# Patient Record
Sex: FEMALE | Race: WHITE | NOT HISPANIC OR LATINO | Employment: FULL TIME | URBAN - METROPOLITAN AREA
[De-identification: names, ages, dates, MRNs, and addresses within clinical notes are randomized per-mention and may not be internally consistent; named-entity substitution may affect disease eponyms.]

---

## 2017-02-13 ENCOUNTER — OFFICE VISIT (OUTPATIENT)
Dept: MEDICAL GROUP | Facility: CLINIC | Age: 27
End: 2017-02-13
Payer: COMMERCIAL

## 2017-02-13 VITALS
RESPIRATION RATE: 16 BRPM | OXYGEN SATURATION: 96 % | BODY MASS INDEX: 30.04 KG/M2 | WEIGHT: 180.3 LBS | DIASTOLIC BLOOD PRESSURE: 86 MMHG | HEIGHT: 65 IN | SYSTOLIC BLOOD PRESSURE: 144 MMHG | HEART RATE: 86 BPM | TEMPERATURE: 98.9 F

## 2017-02-13 DIAGNOSIS — R55 SYNCOPE AND COLLAPSE: ICD-10-CM

## 2017-02-13 DIAGNOSIS — Z78.9 USES BIRTH CONTROL: ICD-10-CM

## 2017-02-13 DIAGNOSIS — R94.01 ABNORMAL EEG: ICD-10-CM

## 2017-02-13 DIAGNOSIS — E66.9 OBESITY (BMI 30-39.9): ICD-10-CM

## 2017-02-13 PROCEDURE — 99203 OFFICE O/P NEW LOW 30 MIN: CPT | Performed by: NURSE PRACTITIONER

## 2017-02-13 RX ORDER — DROSPIRENONE AND ETHINYL ESTRADIOL 0.03MG-3MG
KIT ORAL
Refills: 12 | COMMUNITY
Start: 2017-02-07 | End: 2017-08-01

## 2017-02-13 ASSESSMENT — PATIENT HEALTH QUESTIONNAIRE - PHQ9: CLINICAL INTERPRETATION OF PHQ2 SCORE: 0

## 2017-02-13 NOTE — Clinical Note
[facility]  AMADEO Wakefield  [address]  [place] [address]  Fax: [phone] Authorization for Release/Disclosure of Protected Health Information   Name: MERLY LEAL : 1990 SSN: [id number]   Address: [address] [place] [address] [place] [address] Phone:    [phone] (home)    I authorize the entity listed below to release/disclose the PHI below to [facility]/AMADEO Wakefield   Provider or Entity Name:  Dr. Janette Pineda      Address   [facility], [place]  [address] [place] [address] [place] [address], [place] [address]   Phone:  [phone]    Fax:     Reason for request: continuity of care   Information to be released:    [  ] LAST COLONOSCOPY, including any PATH REPORT [  ] LAST DEXA  [  ] LAST MAMMOGRAM  [  ] LAST PAP [  ] RETINA EXAM REPORT  [  ] IMMUNIZATION RECORDS  [  ] Release all info      [  ] Check here and initial the line next to each item to release ALL health information INCLUDING  _____ Care and treatment for drug and / or alcohol abuse  _____ HIV testing, infection status, or AIDS  _____ Genetic Testing    DATES OF SERVICE OR TIME PERIOD TO BE DISCLOSED: _____________  I understand and acknowledge that:  * This Authorization may be revoked at any time by you in writing, except if your health information has already been used or disclosed.  * Your health information that will be used or disclosed as a result of you signing this authorization could be re-disclosed by the recipient. If this occurs, your re-disclosed health information may no longer be protected by State or Federal laws.  * You may refuse to sign this Authorization. Your refusal will not affect your ability to obtain treatment.  * This Authorization becomes effective upon signing and will  on (date) __________. If no date is indicated, this Authorization will  one (1) year from the signature date.    Name: Merly Leal    Signature:     Date: 2017

## 2017-02-13 NOTE — PROGRESS NOTES
"CC: Establish Care        HPI:     Natasha presents today for the following:  Patient here to establish care.  Transfer from [facility]  All problems are new to me today  Patient's past medical, family, and social history reviewed and placed in Epic today. Immunizations reviewed. Prescriptions-reviewed and entered in Epic    1. Abnormal EEG/Syncope and collapse  Patient states since about the age of 18 and she is been having syncopal episodes. About 30% of the time she will ask she collapsed to the ground with full blackout. Episodes can range between 1-2 minutes following up to 5 minutes and this includes her \"calming down\". She was seen about 3 years ago at [facility] for an overnight observation related to these episodes. EKGs were done, blood work was done she also had an EEG which she states was abnormal. It was desired for her to follow-up with neurology however due to an insurance change or something of that sort this was unable to be done.   She did get some sort of aura before the symptoms where she has a warm sensation to her body, will see \"dots\", will feel sort of shortness of breath. She will not have any associated chest pain or palpitations. This activity is never associated with exercise and is usually associated with rest. Where she sitting down at work typing etc.  She states she often is able to control it and keep herself prolonged symptoms or from losing consciousness by controlling her breathing and from repeatedly flexing and pumping her right hand.  She tells me the frequency of her symptoms has improved and gone from 1-2 times a week down to 1-2 times a month.     She is requesting a referral for neurologist for this can be completed at this point in time    3. Uses birth control  Is on oral birth control pills.    4. Obesity (BMI 30-39.9)  Above ideal weight for height. She does currently work out a lot and is trying for better health    Current Outpatient Prescriptions   Medication Sig " "Dispense Refill   • drospirenone-ethinyl estradiol (PB) 3-0.03 MG per tablet TAKE 1 TABLET BY MOUTH DAILY  12     No current facility-administered medications for this visit.     Social History   Substance Use Topics   • Smoking status: Never Smoker    • Smokeless tobacco: Never Used   • Alcohol Use: No      Comment: Socially     I reviewed patients allergies, problem list and medications today in [facility].    ROS: Any/all pertinent positives listed in the HPI, otherwise all others reviewed are negative today.      /86 mmHg  Pulse 86  Temp(Src) 37.2 °C (98.9 °F)  Resp 16  Ht 1.638 m (5' 4.5\")  Wt 81.784 kg (180 lb 4.8 oz)  BMI 30.48 kg/m2  SpO2 96%  LMP 02/09/2017  Breastfeeding? No    Exam:   Gen: Alert and oriented, No apparent distress. WDWN  Psych: A+Ox3, normal affect and mood  Skin: Warm, dry and intact. Good turgor   No rashes in visible areas.  Eye: Conjunctiva clear, lids normal  ENMT: Lips without lesions, good dentition.   Neck: No Lymphadenopathy, Thyromegaly, Bruits.   Trachea midline, no masses  Lungs: Clear to auscultation bilaterally, no rales or rhonchi   Unlabored respiratory effort.   CV: Regular rate and rhythm, S1, S2. No murmurs.   No Edema  Abd: Soft non tender, non distended. Normal active bowel sounds.    No Hepatosplenomegaly, No pulsatile masses.   Ext: No clubbing, cyanosis, edema.   Neuro:    CNs: II:PERRLA, III/IV/VI EOM without ptosis or nystagmus, V motor intact, VII facial movements without asymmetry or weakness, iX/X palate midline, XI Neck strength intact, XII tongue protuded midline.  Motor: Strength noted 5/5 upper and lower bilateral extremities with normal tone.  No noted atrophy or deformity of motion.  Gait: Grossly Normal gait  Cerebellar signs: Absent  Tremors : Absent      Assessment and Plan.   26 y.o. female with the following issues.    1. Abnormal EEG/Syncope and collapse  Stable. Currently asymptomatic. Referral to neurology. Will request records from " [facility].  - REFERRAL TO NEUROLOGY  - COMP METABOLIC PANEL; Future  - CBC WITH DIFFERENTIAL; Future  - TSH; Future    3. Uses birth control  Stable. She'll continue medication refills and follow-up as per gynecology    4. Obesity (BMI 30-39.9)  Healthy weight encouraged  - Patient identified as having weight management issue.  Appropriate orders and counseling given.

## 2017-02-13 NOTE — MR AVS SNAPSHOT
"        Natasha Pino   2017 1:00 PM   Office Visit   MRN: [id number]    Department:  [facility]   Dept Phone:  [phone]    Description:  Female : 1990   Provider:  AMADEO Wakefield           Reason for Visit     Establish Care black outs Abnormal EEG. Will request from [facility] (2-3 years old)      Allergies as of 2017     No Known Allergies      You were diagnosed with     Abnormal EEG   [813061]       Syncope and collapse   [780.2.ICD-9-CM]       Uses birth control   [113371]       Obesity (BMI 30-39.9)   [925087]         Vital Signs     Blood Pressure Pulse Temperature Respirations Height Weight    144/86 mmHg 86 37.2 °C (98.9 °F) 16 1.638 m (5' 4.5\") 81.784 kg (180 lb 4.8 oz)    Body Mass Index Oxygen Saturation Last Menstrual Period Breastfeeding? Smoking Status       30.48 kg/m2 96% 2017 No Never Smoker        Basic Information     Date Of Birth Sex Race Ethnicity Preferred Language    1990 Female White Non- English      Problem List              ICD-10-CM Priority Class Noted - Resolved    Obesity (BMI 30-39.9) E66.9   2017 - Present    Uses birth control-OCPs Z30.9   2017 - Present    Syncope and collapse R55   2017 - Present    Abnormal EEG R94.01   2017 - Present      Health Maintenance        Date Due Completion Dates    IMM HEP B VACCINE (1 of 3 - Primary Series) 1989 ---    IMM HEP A VACCINE (1 of 2 - Standard Series) 1990 ---    IMM VARICELLA (CHICKENPOX) VACCINE (1 of 2 - 2 Dose Adolescent Series) 2002 ---    IMM DTaP/Tdap/Td Vaccine (1 - Tdap) 2008 ---    IMM INFLUENZA (1) 2016 ---    PAP SMEAR 3/1/2019 3/1/2016 (Done)    Override on 3/1/2016: Done (NL; GYN=farringer  ABNL with HPV 2011 with LEEP)            Current Immunizations     No immunizations on file.      Below and/or attached are the medications your provider expects you to take. Review all of your home medications and newly ordered " medications with your provider and/or pharmacist. Follow medication instructions as directed by your provider and/or pharmacist. Please keep your medication list with you and share with your provider. Update the information when medications are discontinued, doses are changed, or new medications (including over-the-counter products) are added; and carry medication information at all times in the event of emergency situations     Allergies:  No Known Allergies          Medications  Valid as of: February 13, 2017 -  1:41 PM    Generic Name Brand Name Tablet Size Instructions for use    Drospirenone-Ethinyl Estradiol (Tab) PB 3-0.03 MG TAKE 1 TABLET BY MOUTH DAILY        .                 Medicines prescribed today were sent to:     [id number] - [place], [place] - [address]    [address] [place] [address]    Phone: [phone] Fax: [phone]    Open 24 Hours?: No      Medication refill instructions:       If your prescription bottle indicates you have medication refills left, it is not necessary to call your provider’s office. Please contact your pharmacy and they will refill your medication.    If your prescription bottle indicates you do not have any refills left, you may request refills at any time through one of the following ways: The online [email or web address] system (except Urgent Care), by calling your provider’s office, or by asking your pharmacy to contact your provider’s office with a refill request. Medication refills are processed only during regular business hours and may not be available until the next business day. Your provider may request additional information or to have a follow-up visit with you prior to refilling your medication.   *Please Note: Medication refills are assigned a new Rx number when refilled electronically. Your pharmacy may indicate that no refills were authorized even though a new prescription for the same medication is available at the pharmacy. Please request the medicine by name  with the pharmacy before contacting your provider for a refill.        Your To Do List     Future Labs/Procedures Complete By Expires    CBC WITH DIFFERENTIAL  As directed 2/14/2018    COMP METABOLIC PANEL  As directed 2/14/2018    TSH  As directed 2/14/2018      Referral     A referral request has been sent to our patient care coordination department. Please allow 3-5 business days for us to process this request and contact you either by phone or mail. If you do not hear from us by the 5th business day, please call us at [phone].           its learning Access Code: Activation code not generated  Current its learning Status: Active

## 2017-02-13 NOTE — Clinical Note
[facility]  AMADEO Wakefield  [address]  [place] [address]  Fax: [phone] Authorization for Release/Disclosure of Protected Health Information   Name: MERLY LEAL : 1990 SSN: [id number]   Address: [address] [place] [address]  [place] [address] Phone:    [phone] (home)    I authorize the entity listed below to release/disclose the PHI below to [facility]/AMADEO Wakefield   Provider or Entity Name:  [facility]     Address   City, State, [place], [place] Phone:      Fax:     Reason for request: continuity of care   Information to be released:    [  ] LAST COLONOSCOPY, including any PATH REPORT [  ] LAST DEXA  [  ] LAST MAMMOGRAM  [  ] LAST PAP [  ] RETINA EXAM REPORT  [  ] IMMUNIZATION RECORDS  [X ] Release all info -EEG results from 2-3 years      [  ] Check here and initial the line next to each item to release ALL health information INCLUDING  _____ Care and treatment for drug and / or alcohol abuse  _____ HIV testing, infection status, or AIDS  _____ Genetic Testing    DATES OF SERVICE OR TIME PERIOD TO BE DISCLOSED: _____________  I understand and acknowledge that:  * This Authorization may be revoked at any time by you in writing, except if your health information has already been used or disclosed.  * Your health information that will be used or disclosed as a result of you signing this authorization could be re-disclosed by the recipient. If this occurs, your re-disclosed health information may no longer be protected by State or Federal laws.  * You may refuse to sign this Authorization. Your refusal will not affect your ability to obtain treatment.  * This Authorization becomes effective upon signing and will  on (date) __________. If no date is indicated, this Authorization will  one (1) year from the signature date.    Name: Merly Leal    Signature:     Date: 2017

## 2017-02-15 ENCOUNTER — HOSPITAL ENCOUNTER (OUTPATIENT)
Dept: LAB | Facility: MEDICAL CENTER | Age: 27
End: 2017-02-15
Attending: NURSE PRACTITIONER
Payer: COMMERCIAL

## 2017-02-15 DIAGNOSIS — R55 SYNCOPE AND COLLAPSE: ICD-10-CM

## 2017-02-15 LAB
ALBUMIN SERPL BCP-MCNC: 4.4 G/DL (ref 3.2–4.9)
ALBUMIN/GLOB SERPL: 1.4 G/DL
ALP SERPL-CCNC: 47 U/L (ref 30–99)
ALT SERPL-CCNC: 31 U/L (ref 2–50)
ANION GAP SERPL CALC-SCNC: 9 MMOL/L (ref 0–11.9)
AST SERPL-CCNC: 20 U/L (ref 12–45)
BASOPHILS # BLD AUTO: 0.03 K/UL (ref 0–0.12)
BASOPHILS NFR BLD AUTO: 0.5 % (ref 0–1.8)
BILIRUB SERPL-MCNC: 0.6 MG/DL (ref 0.1–1.5)
BUN SERPL-MCNC: 13 MG/DL (ref 8–22)
CALCIUM SERPL-MCNC: 9.9 MG/DL (ref 8.5–10.5)
CHLORIDE SERPL-SCNC: 104 MMOL/L (ref 96–112)
CO2 SERPL-SCNC: 25 MMOL/L (ref 20–33)
CREAT SERPL-MCNC: 0.79 MG/DL (ref 0.5–1.4)
EOSINOPHIL # BLD: 0.08 K/UL (ref 0–0.51)
EOSINOPHIL NFR BLD AUTO: 1.2 % (ref 0–6.9)
ERYTHROCYTE [DISTWIDTH] IN BLOOD BY AUTOMATED COUNT: 41.1 FL (ref 35.9–50)
GLOBULIN SER CALC-MCNC: 3.1 G/DL (ref 1.9–3.5)
GLUCOSE SERPL-MCNC: 71 MG/DL (ref 65–99)
HCT VFR BLD AUTO: 45.2 % (ref 37–47)
HGB BLD-MCNC: 15.1 G/DL (ref 12–16)
IMM GRANULOCYTES # BLD AUTO: 0.02 K/UL (ref 0–0.11)
IMM GRANULOCYTES NFR BLD AUTO: 0.3 % (ref 0–0.9)
LYMPHOCYTES # BLD: 1.74 K/UL (ref 1–4.8)
LYMPHOCYTES NFR BLD AUTO: 26.7 % (ref 22–41)
MCH RBC QN AUTO: 30.5 PG (ref 27–33)
MCHC RBC AUTO-ENTMCNC: 33.4 G/DL (ref 33.6–35)
MCV RBC AUTO: 91.3 FL (ref 81.4–97.8)
MONOCYTES # BLD: 0.51 K/UL (ref 0–0.85)
MONOCYTES NFR BLD AUTO: 7.8 % (ref 0–13.4)
NEUTROPHILS # BLD: 4.13 K/UL (ref 2–7.15)
NEUTROPHILS NFR BLD AUTO: 63.5 % (ref 44–72)
NRBC # BLD AUTO: 0 K/UL
NRBC BLD-RTO: 0 /100 WBC
PLATELET # BLD AUTO: 358 K/UL (ref 164–446)
PMV BLD AUTO: 9.4 FL (ref 9–12.9)
POTASSIUM SERPL-SCNC: 3.7 MMOL/L (ref 3.6–5.5)
PROT SERPL-MCNC: 7.5 G/DL (ref 6–8.2)
RBC # BLD AUTO: 4.95 M/UL (ref 4.2–5.4)
SODIUM SERPL-SCNC: 138 MMOL/L (ref 135–145)
TSH SERPL DL<=0.005 MIU/L-ACNC: 1.31 UIU/ML (ref 0.3–3.7)
WBC # BLD AUTO: 6.5 K/UL (ref 4.8–10.8)

## 2017-02-15 PROCEDURE — 84443 ASSAY THYROID STIM HORMONE: CPT

## 2017-02-15 PROCEDURE — 85025 COMPLETE CBC W/AUTO DIFF WBC: CPT

## 2017-02-15 PROCEDURE — 36415 COLL VENOUS BLD VENIPUNCTURE: CPT

## 2017-02-15 PROCEDURE — 80053 COMPREHEN METABOLIC PANEL: CPT

## 2017-03-08 ENCOUNTER — HOSPITAL ENCOUNTER (OUTPATIENT)
Dept: LAB | Facility: MEDICAL CENTER | Age: 27
End: 2017-03-08
Attending: OBSTETRICS & GYNECOLOGY
Payer: COMMERCIAL

## 2017-03-08 ENCOUNTER — HOSPITAL ENCOUNTER (OUTPATIENT)
Facility: MEDICAL CENTER | Age: 27
End: 2017-03-08
Attending: OBSTETRICS & GYNECOLOGY
Payer: COMMERCIAL

## 2017-03-08 LAB — CYTOLOGY REG CYTOL: NORMAL

## 2017-03-08 PROCEDURE — 88175 CYTOPATH C/V AUTO FLUID REDO: CPT

## 2017-05-05 ENCOUNTER — OFFICE VISIT (OUTPATIENT)
Dept: NEUROLOGY | Facility: MEDICAL CENTER | Age: 27
End: 2017-05-05
Payer: COMMERCIAL

## 2017-05-05 VITALS
DIASTOLIC BLOOD PRESSURE: 84 MMHG | HEIGHT: 64 IN | HEART RATE: 83 BPM | OXYGEN SATURATION: 96 % | WEIGHT: 180 LBS | RESPIRATION RATE: 16 BRPM | SYSTOLIC BLOOD PRESSURE: 126 MMHG | TEMPERATURE: 97.9 F | BODY MASS INDEX: 30.73 KG/M2

## 2017-05-05 DIAGNOSIS — G40.209 LOCALIZATION-RELATED PARTIAL EPILEPSY WITH COMPLEX PARTIAL SEIZURES (HCC): Primary | ICD-10-CM

## 2017-05-05 DIAGNOSIS — G43.009 MIGRAINE WITHOUT AURA AND WITHOUT STATUS MIGRAINOSUS, NOT INTRACTABLE: ICD-10-CM

## 2017-05-05 PROCEDURE — 99244 OFF/OP CNSLTJ NEW/EST MOD 40: CPT | Performed by: PSYCHIATRY & NEUROLOGY

## 2017-05-05 RX ORDER — IBUPROFEN 200 MG
400 TABLET ORAL EVERY 6 HOURS PRN
COMMUNITY
End: 2021-03-04

## 2017-05-05 ASSESSMENT — ENCOUNTER SYMPTOMS
DEPRESSION: 0
SEIZURES: 1
TREMORS: 0
LOSS OF CONSCIOUSNESS: 1
HEADACHES: 1
PHOTOPHOBIA: 0
MYALGIAS: 0
DOUBLE VISION: 0
MEMORY LOSS: 0

## 2017-05-05 NOTE — PROGRESS NOTES
"Subjective:      Natasha Pino is a 26 y.o. female who presents from the office of MANNY Thomas, for consultation with a history of likely partial onset seizures and migraine without aura.    HPI    Ms. Pino is a pleasant 26-year-old right-handed  female who first noted episodes beginning in about 2010 when she still was a freshman in college. Spontaneous in onset, were never provoked by any specific circumstance. The episodes are stereotyped, she describes a sudden flushing sensation as if she were having a \"heat stroke\", there was some occasional familiarity to this and significant dizziness was more consistent. Still awake, she would note the right hand opening and closing involuntarily, and then she will lose alertness. Observers would note she would stare, she could collapse to the ground and then she would stiffen, her eyes simply roving and she would remain unresponsive. The episode would last for minutes at a time, and she would then begin to awaken slowly, there was always a post ictal sense of malaise, confusion and dizziness which would last 24 hours though she would recover completely without sequelae.    When things first started, she would average 1-2 episodes in a week, with her first pregnancy about 3 years later, they actually stopped entirely and following the pregnancy she has had maybe 1-2 episodes every 2 weeks or more. She was never able to identify other provoking circumstances, the events occurred randomly over the course of day, she was never awakened by them at night.    For a long time she never sought attention for these. Eventually she was evaluated in November, 2013 during that first pregnancy, EEG study was done at that time (I did review the report in the electronic health record) and was normal except for some findings consistent with sedative effect because of increased beta activity. MRI imaging was never done. She was never treated at that time given that " "she was pregnant and the events themselves had stopped. Repeat studies have never been performed. Now that she has a young child with her, given that the events have an altered alertness and loss of voluntary motor control, she has been more concerned and is now seeking further evaluations and recommendations.    The developmental and birth histories are remarkable for the fact that she was premature at 27 weeks of age, though there was no history of developmental or motor skills delay as she got older, no history of febrile convulsions, etc.    Medical history is remarkable for migraine without aura, never associated with altered sensorium, always incapacitating with heightened sensitivities, etc. These occur maybe once a month, historically there were no triggers, though now she notes these beginning within less than 24 hours after one of her \"seizure\" events described above. She is not been treated. Medical history is otherwise completely unremarkable. There is no surgical history of note, she denies a history of recurrent head trauma. She is not on birth control, and during the placebo week, with menses, neither her headaches nor these events occur consistently.    There is a family history of seizures beginning in her 19-year-old sister, she knows little of her mother, her father never had headaches or seizures. Her 4-year-old son as well as her other 3 siblings are alive and well. She drinks alcohol rarely, does not smoke. She works for a local [employer] firm, her fiancé, Jovi, is a construction. She denies any atypical stressors. Other than birth control, she has Motrin and Excedrin used as needed.    Review of Systems   Constitutional: Negative for malaise/fatigue.   Eyes: Negative for double vision and photophobia.   Musculoskeletal: Negative for myalgias.   Neurological: Positive for seizures, loss of consciousness and headaches. Negative for tremors.   Psychiatric/Behavioral: Negative for depression " "and memory loss.   All other systems reviewed and are negative.       Objective:     /84 mmHg  Pulse 83  Temp(Src) 36.6 °C (97.9 °F)  Resp 16  Ht 1.638 m (5' 4.49\")  Wt 81.647 kg (180 lb)  BMI 30.43 kg/m2  SpO2 96%     Physical Exam    She appears in no acute distress. Vital signs are stable. There is no malar rash, jaw or temporal tenderness, jaw claudication, or allodynia. The neck is supple, range of motion is full, carotid pulses are present bilaterally without asymmetry or bruits. Cardiac evaluation reveals a regular rhythm without murmur. There is no evidence of lower extremity edema, nor evidence of diffuse rash or bruising.    She is fully oriented, there is no aphasia, agnosia, apraxia, or inattention.    PERRLA/EOMI, visual fields are full to finger counting bilaterally, funduscopic exam reveals sharp disc margins, there is no facial asymmetry, sensory loss, or bulbar dysfunction. The tongue and uvular midline, jaw jerk is absent, shoulder shrug and head rotation are intact and symmetric.    Tone is normal throughout, there is no tremor, asterixis, or drift. Strength is 5/5 in all 4 extremities, reflexes are present at all points without asymmetries, both toes are downgoing.    She walks with normal station, heel, toe, and tandem walking are intact. Arm swing is symmetric, there is no circumduction, repetitive movements with the hands, fingers and feet are intact and symmetric with maintained and normal amplitude and frequencies. There is no appendicular dystaxia with any extremities.    Sensory exam is intact to vibration, temperature and pinprick. Romberg is absent.     Assessment/Plan:     1. 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 She's been having this for quite some time, I am a " little disconcerted, as is she, about the increased frequency. By the nature of her work, the fact that she does have a young child, etc., I would recommend empiric treatment with an antiepileptic, pending workup which should include both EEG and MRI imaging. The focality by history suggested left hemispheric localization, either subfrontal or temporal in nature. Though she has a history of migraine, these are not related, I doubt this is related to a sleep disorder, cerebrovascular disease, global hypoperfusion due to arrhythmia, medication effect obviously, etc. Fortunately her neurologic examination is normal, her previous EEG as well, both good prognostic signs.    Face-to-face time spent reviewing all the above. We talked about the nature of epileptic disorders, how symptoms that are part of each attack occur, the rationale for treatments, prognosis, etc. She feels quite comfortable moving forward with treatment as well as diagnostics. Lamictal XR titration will be started at 25 mg daily, increasing weekly to 50 mg and eventually 100 mg daily. Afterwards I would like to get her up to at least 200 mg daily, even 300 mg if needed and necessary. Side effects were reviewed. She was reassured that if side effects occur, we will simply try something else. Because she is of childbearing age, Lamictal would be the ideal choice given its relative safety record in regards to pregnancy risk compared to most of the other second generation antiepileptics available. I will call her with the results of tests as they come in, otherwise we can follow-up in several months, phone contact as we adjust medications. If these events do attenuate as we go up on the lamotrigine, this acts as both a diagnostic as well as therapeutic trial. Fortunately, she has sustained aura where she is awake for long enough to get herself to a safe place such as sitting down or pulling the car over to the side of the road. Thus I think it is safe for  her to drive. Any generalized seizure she does need to go to an emergency room or contact this office, but she does need to stop driving in the interim. All of this was reviewed in full. We talked about eventually getting a medication identification bracelet as well. I did insist that she bring Jovi with her to her next visit.    - REFERRAL TO NEURODIAGNOSTICS (EEG,EP,EMG/NCS/DBS) Modality Requested: EEG-Video  - MR-BRAIN-W/O; Future  - LamoTRIgine 25 & 50 & 100 MG Kit; Take 1 Tab by mouth every day. Take one tab daily, increasing dose, as directed in titration suman.  Dispense: 1 Kit; Refill: 0    2. Migraine without aura and without status migrainosus, not intractable  Stable, not a real contribute a factor when it comes to these seizures, treatment can be initiating moving forwards, I don't think maintenance therapies are indicated given the lower frequency, but a more effective rescue can be discussed in future visits.    Time: Evaluation 60 minutes for exam come review, discussion, and education, high complexity  Discussion: As mentioned in the assessment, over 60% of the time spent face-to-face counseling and coordinating care

## 2017-05-05 NOTE — MR AVS SNAPSHOT
"        Natasha Palacios   2017 8:40 AM   Office Visit   MRN: [id number]    Department:  Neurology [facility]   Dept Phone:  [phone]    Description:  Female : 1990   Provider:  Lino Anguiano M.D.           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   [facility] (--)    [address] [place] [address], [address]  [facility] [id number] [phone]           You will be receiving a confirmation call a few days before your appointment from our automated call confirmation system.              Problem List              ICD-10-CM Priority Class Noted - Resolved    Obesity (BMI 30-39.9) E66.9   2017 - Present    Uses birth control-OCPs Z30.9   2017 - Present    Syncope and collapse R55   2017 - Present    Abnormal EEG R94.01   2017 - Present    Localization-related partial epilepsy with complex partial seizures (CMS-HCC) G40.209   2017 - Present      Health Maintenance        Date Due Completion Dates    IMM HEP B VACCINE (1 of 3 - Primary Series) 1989 ---    IMM HEP A VACCINE (1 of 2 - Standard Series) 1990 ---    IMM VARICELLA (CHICKENPOX) VACCINE (1 of 2 - 2 Dose Adolescent Series) 2002 ---    IMM " DTaP/Tdap/Td Vaccine (1 - Tdap) 6/27/2008 ---    PAP SMEAR 3/8/2020 3/8/2017, 3/1/2016 (Done)    Override on 3/1/2016: Done (NL; GYN=farringer  ABNL with HPV 2011 with LEEP)            Current Immunizations     No immunizations on file.      Below and/or attached are the medications your provider expects you to take. Review all of your home medications and newly ordered medications with your provider and/or pharmacist. Follow medication instructions as directed by your provider and/or pharmacist. Please keep your medication list with you and share with your provider. Update the information when medications are discontinued, doses are changed, or new medications (including over-the-counter products) are added; and carry medication information at all times in the event of emergency situations     Allergies:  No Known Allergies          Medications  Valid as of: May 05, 2017 -  9:45 AM    Generic Name Brand Name Tablet Size Instructions for use    Aspirin-Acetaminophen-Caffeine (Tab) EXCEDRIN 250-250-65 MG Take 1 Tab by mouth every 6 hours as needed for Headache.        Drospirenone-Ethinyl Estradiol (Tab) PB 3-0.03 MG TAKE 1 TABLET BY MOUTH DAILY        Ibuprofen (Tab) MOTRIN 200 MG Take 200 mg by mouth every 6 hours as needed.        LamoTRIgine (Kit) LamoTRIgine 25 & 50 & 100 MG Take 1 Tab by mouth every day. Take one tab daily, increasing dose, as directed in titration suman.        .                 Medicines prescribed today were sent to:     [facility]/PHARMACY #[id number] - [place] - [address]    [address] [place] [address]    Phone: [phone] Fax: [phone]    Open 24 Hours?: No      Medication refill instructions:       If your prescription bottle indicates you have medication refills left, it is not necessary to call your provider’s office. Please contact your pharmacy and they will refill your medication.    If your prescription bottle indicates you do not have any refills left, you may request refills at  any time through one of the following ways: The online [employer] system (except Urgent Care), by calling your provider’s office, or by asking your pharmacy to contact your provider’s office with a refill request. Medication refills are processed only during regular business hours and may not be available until the next business day. Your provider may request additional information or to have a follow-up visit with you prior to refilling your medication.   *Please Note: Medication refills are assigned a new Rx number when refilled electronically. Your pharmacy may indicate that no refills were authorized even though a new prescription for the same medication is available at the pharmacy. Please request the medicine by name with the pharmacy before contacting your provider for a refill.        Your To Do List     Future Labs/Procedures Complete By Expires    MR-BRAIN-W/O  As directed 5/5/2018      Referral     A referral request has been sent to our patient care coordination department. Please allow 3-5 business days for us to process this request and contact you either by phone or mail. If you do not hear from us by the 5th business day, please call us at [phone].           [employer] Access Code: Activation code not generated  Current [employer] Status: Active

## 2017-05-19 ENCOUNTER — OFFICE VISIT (OUTPATIENT)
Dept: NEUROLOGY | Facility: MEDICAL CENTER | Age: 27
End: 2017-05-19
Payer: COMMERCIAL

## 2017-05-19 ENCOUNTER — TELEPHONE (OUTPATIENT)
Dept: NEUROLOGY | Facility: MEDICAL CENTER | Age: 27
End: 2017-05-19

## 2017-05-19 VITALS
SYSTOLIC BLOOD PRESSURE: 106 MMHG | OXYGEN SATURATION: 96 % | RESPIRATION RATE: 16 BRPM | BODY MASS INDEX: 28.51 KG/M2 | HEART RATE: 82 BPM | TEMPERATURE: 98.1 F | WEIGHT: 167 LBS | DIASTOLIC BLOOD PRESSURE: 68 MMHG | HEIGHT: 64 IN

## 2017-05-19 DIAGNOSIS — G40.209 LOCALIZATION-RELATED PARTIAL EPILEPSY WITH COMPLEX PARTIAL SEIZURES (HCC): Primary | ICD-10-CM

## 2017-05-19 PROCEDURE — 99214 OFFICE O/P EST MOD 30 MIN: CPT | Performed by: PSYCHIATRY & NEUROLOGY

## 2017-05-19 RX ORDER — LEVETIRACETAM 500 MG/1
TABLET, FILM COATED, EXTENDED RELEASE ORAL
Qty: 60 TAB | Refills: 1 | Status: SHIPPED | OUTPATIENT
Start: 2017-05-19 | End: 2017-08-01

## 2017-05-19 ASSESSMENT — ENCOUNTER SYMPTOMS
SENSORY CHANGE: 0
HEADACHES: 1
LOSS OF CONSCIOUSNESS: 1
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
## 2017-05-19 NOTE — MR AVS SNAPSHOT
"Natasha Pino   2017 10:40 AM   Office Visit   MRN: [id number]    Department[facility]  [facility]   Dept Phone:  [phone]    Description:  Female : 1990   Provider:  Lino Anguiano M.D.           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)   [581237]  -  Primary       Vital Signs     Blood Pressure Pulse Temperature Respirations Height Weight    106/68 mmHg 82 36.7 °C (98.1 °F) 16 1.626 m (5' 4\") 75.751 kg (167 lb)    Body Mass Index Oxygen Saturation Smoking Status             28.65 kg/m2 96% Never Smoker          Basic Information     Date Of Birth Sex Race Ethnicity Preferred Language    1990 Female White Non- English      Your appointments     May 26, 2017  6:45 PM   MR BRAIN W/O 30 with [address] MRI 2   [facility] - MRI - [address]    [address]  [facility] [address]   [phone]            Aug 07, 2017  4:20 PM   Follow Up Visit with Lino Anguiano M.D.   [facility] Neurology (--)    [email or web address]  [facility] [id number] [phone]           You will be receiving a confirmation call a few days before your appointment from our automated call confirmation system.            Aug 29, 2017  9:00 AM   VIDEO EEG MONITORING with NEURODIAGNOSTIC LAB [facility] Neurology (--)    [email or web address]  [facility] [address]   [phone]           Limit caffeine. Clean, dry hair, no gels, oils, or hairsprays. If testing for seizures or spells, please allow no more than 4 hours of sleep the night before the exam (sleep 12am-4am) Pre-authorization is typically required.              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) G40.209   5/5/2017 - Present    Migraine without aura and without status migrainosus, not intractable G43.009   5/5/2017 - Present      Health Maintenance        Date Due Completion Dates    IMM HEP B VACCINE (1 of 3 - Primary Series) 1989 ---    IMM HEP A VACCINE (1 of 2 - Standard Series) 6/27/1990 ---    IMM VARICELLA (CHICKENPOX) VACCINE (1 of 2 - 2 Dose Adolescent Series) 6/27/2002 ---    IMM DTaP/Tdap/Td Vaccine (1 - Tdap) 6/27/2008 ---    PAP SMEAR 3/8/2020 3/8/2017, 3/1/2016 (Done)    Override on 3/1/2016: Done (NL; GYN=farringer  ABNL with HPV 2011 with LEEP)            Current Immunizations     No immunizations on file.      Below and/or attached are the medications your provider expects you to take. Review all of your home medications and newly ordered medications with your provider and/or pharmacist. Follow medication instructions as directed by your provider and/or pharmacist. Please keep your medication list with you and share with your provider. Update the information when medications are discontinued, doses are changed, or new medications (including over-the-counter products) are added; and carry medication information at all times in the event of emergency situations     Allergies:  No Known Allergies          Medications  Valid as of: May 19, 2017 - 11:18 AM    Generic Name Brand Name Tablet Size Instructions for use    Aspirin-Acetaminophen-Caffeine (Tab) EXCEDRIN 250-250-65 MG Take 1 Tab by mouth every 6 hours as needed for Headache.        Drospirenone-Ethinyl Estradiol (Tab) PB 3-0.03 MG TAKE 1 TABLET BY MOUTH DAILY        Ibuprofen (Tab) MOTRIN 200 MG Take 200 mg by mouth every 6 hours as needed.        LamoTRIgine (Kit) LamoTRIgine 25 & 50 & 100 MG Take 1 Tab by mouth every day. Take one tab daily, increasing dose, as directed in titration suman.         LevETIRAcetam (TABLET SR 24 HR) KEPPRA 500 MG 1 tab qhs for 4 days, then 2 tab qhs        .                 Medicines prescribed today were sent to:     [facility]/PHARMACY #[id number] - [place] - [address] [place] [address]    Phone: [phone] Fax: [phone]    Open 24 Hours?: No      Medication refill instructions:       If your prescription bottle indicates you have medication refills left, it is not necessary to call your provider’s office. Please contact your pharmacy and they will refill your medication.    If your prescription bottle indicates you do not have any refills left, you may request refills at any time through one of the following ways: The online [employer] system (except Urgent Care), by calling your provider’s office, or by asking your pharmacy to contact your provider’s office with a refill request. Medication refills are processed only during regular business hours and may not be available until the next business day. Your provider may request additional information or to have a follow-up visit with you prior to refilling your medication.   *Please Note: Medication refills are assigned a new Rx number when refilled electronically. Your pharmacy may indicate that no refills were authorized even though a new prescription for the same medication is available at the pharmacy. Please request the medicine by name with the pharmacy before contacting your provider for a refill.           [employer] Access Code: Activation code not generated  Current [employer] Status: Active

## 2017-05-20 ENCOUNTER — HOSPITAL ENCOUNTER (OUTPATIENT)
Dept: RADIOLOGY | Facility: MEDICAL CENTER | Age: 27
End: 2017-05-20
Attending: PSYCHIATRY & NEUROLOGY
Payer: COMMERCIAL

## 2017-05-20 DIAGNOSIS — G40.209 LOCALIZATION-RELATED PARTIAL EPILEPSY WITH COMPLEX PARTIAL SEIZURES (HCC): ICD-10-CM

## 2017-05-20 PROCEDURE — 70551 MRI BRAIN STEM W/O DYE: CPT

## 2017-05-24 ENCOUNTER — TELEPHONE (OUTPATIENT)
Dept: NEUROLOGY | Facility: MEDICAL CENTER | Age: 27
End: 2017-05-24

## 2017-06-20 ENCOUNTER — OFFICE VISIT (OUTPATIENT)
Dept: URGENT CARE | Facility: CLINIC | Age: 27
End: 2017-06-20
Payer: COMMERCIAL

## 2017-06-20 ENCOUNTER — HOSPITAL ENCOUNTER (OUTPATIENT)
Dept: RADIOLOGY | Facility: MEDICAL CENTER | Age: 27
End: 2017-06-20
Attending: NURSE PRACTITIONER
Payer: COMMERCIAL

## 2017-06-20 ENCOUNTER — HOSPITAL ENCOUNTER (OUTPATIENT)
Facility: MEDICAL CENTER | Age: 27
End: 2017-06-20
Attending: NURSE PRACTITIONER
Payer: COMMERCIAL

## 2017-06-20 VITALS
HEART RATE: 74 BPM | DIASTOLIC BLOOD PRESSURE: 64 MMHG | WEIGHT: 174 LBS | SYSTOLIC BLOOD PRESSURE: 110 MMHG | RESPIRATION RATE: 14 BRPM | HEIGHT: 64 IN | OXYGEN SATURATION: 98 % | TEMPERATURE: 99.7 F | BODY MASS INDEX: 29.71 KG/M2

## 2017-06-20 DIAGNOSIS — R10.9 ABDOMINAL PAIN, UNSPECIFIED LOCATION: ICD-10-CM

## 2017-06-20 DIAGNOSIS — R60.0 BILATERAL EDEMA OF LOWER EXTREMITY: ICD-10-CM

## 2017-06-20 DIAGNOSIS — R06.02 SOB (SHORTNESS OF BREATH): ICD-10-CM

## 2017-06-20 LAB
ALBUMIN SERPL BCP-MCNC: 4.1 G/DL (ref 3.2–4.9)
ALBUMIN/GLOB SERPL: 1.5 G/DL
ALP SERPL-CCNC: 42 U/L (ref 30–99)
ALT SERPL-CCNC: 22 U/L (ref 2–50)
ANION GAP SERPL CALC-SCNC: 10 MMOL/L (ref 0–11.9)
APPEARANCE UR: NORMAL
AST SERPL-CCNC: 17 U/L (ref 12–45)
BASOPHILS # BLD AUTO: 0.5 % (ref 0–1.8)
BASOPHILS # BLD: 0.03 K/UL (ref 0–0.12)
BILIRUB SERPL-MCNC: 0.5 MG/DL (ref 0.1–1.5)
BILIRUB UR STRIP-MCNC: NORMAL MG/DL
BUN SERPL-MCNC: 8 MG/DL (ref 8–22)
CALCIUM SERPL-MCNC: 9.1 MG/DL (ref 8.5–10.5)
CHLORIDE SERPL-SCNC: 107 MMOL/L (ref 96–112)
CO2 SERPL-SCNC: 24 MMOL/L (ref 20–33)
COLOR UR AUTO: NORMAL
CREAT SERPL-MCNC: 0.74 MG/DL (ref 0.5–1.4)
EOSINOPHIL # BLD AUTO: 0.04 K/UL (ref 0–0.51)
EOSINOPHIL NFR BLD: 0.7 % (ref 0–6.9)
ERYTHROCYTE [DISTWIDTH] IN BLOOD BY AUTOMATED COUNT: 41.9 FL (ref 35.9–50)
GFR SERPL CREATININE-BSD FRML MDRD: >60 ML/MIN/1.73 M 2
GLOBULIN SER CALC-MCNC: 2.8 G/DL (ref 1.9–3.5)
GLUCOSE SERPL-MCNC: 124 MG/DL (ref 65–99)
GLUCOSE UR STRIP.AUTO-MCNC: NORMAL MG/DL
HCT VFR BLD AUTO: 40.7 % (ref 37–47)
HGB BLD-MCNC: 13.8 G/DL (ref 12–16)
IMM GRANULOCYTES # BLD AUTO: 0.02 K/UL (ref 0–0.11)
IMM GRANULOCYTES NFR BLD AUTO: 0.3 % (ref 0–0.9)
KETONES UR STRIP.AUTO-MCNC: NORMAL MG/DL
LEUKOCYTE ESTERASE UR QL STRIP.AUTO: NORMAL
LYMPHOCYTES # BLD AUTO: 1.48 K/UL (ref 1–4.8)
LYMPHOCYTES NFR BLD: 25.5 % (ref 22–41)
MCH RBC QN AUTO: 30.8 PG (ref 27–33)
MCHC RBC AUTO-ENTMCNC: 33.9 G/DL (ref 33.6–35)
MCV RBC AUTO: 90.8 FL (ref 81.4–97.8)
MONOCYTES # BLD AUTO: 0.29 K/UL (ref 0–0.85)
MONOCYTES NFR BLD AUTO: 5 % (ref 0–13.4)
NEUTROPHILS # BLD AUTO: 3.94 K/UL (ref 2–7.15)
NEUTROPHILS NFR BLD: 68 % (ref 44–72)
NITRITE UR QL STRIP.AUTO: NORMAL
NRBC # BLD AUTO: 0 K/UL
NRBC BLD AUTO-RTO: 0 /100 WBC
PH UR STRIP.AUTO: 6 [PH] (ref 5–8)
PLATELET # BLD AUTO: 331 K/UL (ref 164–446)
PMV BLD AUTO: 10.2 FL (ref 9–12.9)
POTASSIUM SERPL-SCNC: 3.7 MMOL/L (ref 3.6–5.5)
PROT SERPL-MCNC: 6.9 G/DL (ref 6–8.2)
PROT UR QL STRIP: 6 MG/DL
RBC # BLD AUTO: 4.48 M/UL (ref 4.2–5.4)
RBC UR QL AUTO: NORMAL
SODIUM SERPL-SCNC: 141 MMOL/L (ref 135–145)
SP GR UR STRIP.AUTO: 1.01
UROBILINOGEN UR STRIP-MCNC: 0.2 MG/DL
WBC # BLD AUTO: 5.8 K/UL (ref 4.8–10.8)

## 2017-06-20 PROCEDURE — 85025 COMPLETE CBC W/AUTO DIFF WBC: CPT

## 2017-06-20 PROCEDURE — 81002 URINALYSIS NONAUTO W/O SCOPE: CPT | Performed by: NURSE PRACTITIONER

## 2017-06-20 PROCEDURE — 74176 CT ABD & PELVIS W/O CONTRAST: CPT

## 2017-06-20 PROCEDURE — 99204 OFFICE O/P NEW MOD 45 MIN: CPT | Performed by: NURSE PRACTITIONER

## 2017-06-20 PROCEDURE — 80053 COMPREHEN METABOLIC PANEL: CPT

## 2017-06-20 ASSESSMENT — ENCOUNTER SYMPTOMS
LEG SWELLING: 1
CLAUDICATION: 1
ABDOMINAL PAIN: 1
DIARRHEA: 0
FATIGUE: 1
FEVER: 0
CONSTIPATION: 0
SHORTNESS OF BREATH: 1
VOMITING: 0
FLANK PAIN: 0
COUGH: 0

## 2017-06-20 NOTE — MR AVS SNAPSHOT
"        Natasha Pino   2017 11:30 AM   Office Visit   MRN: [id number]    Department:  [facility]   Dept Phone:  [phone]    Description:  Female : 1990   Provider:  AMADEO Castillo           Reason for Visit     Leg Swelling bilateral legs x 2 days and getting worse and painfull, hard to walk      Allergies as of 2017     No Known Allergies      You were diagnosed with     Bilateral edema of lower extremity   [325723]       SOB (shortness of breath)   [172098]       Abdominal pain, unspecified location   [4216350]         Vital Signs     Blood Pressure Pulse Temperature Respirations Height Weight    110/64 mmHg 74 37.6 °C (99.7 °F) 14 1.626 m (5' 4\") 78.926 kg (174 lb)    Body Mass Index Oxygen Saturation Smoking Status             29.85 kg/m2 98% Never Smoker          Basic Information     Date Of Birth Sex Race Ethnicity Preferred Language    1990 Female White Non- English      Your appointments     2017  5:00 PM   CT BODY WO 30 with [facility] CT 1   [facility]    [address]  [place] [address] [phone]           Some exams require specific prep instructions that would have been given to you at time of scheduling. If you have any additional questions about the prep instructions, please call Imaging Scheduling at [phone] and press #2.            2017  9:40 AM   Follow Up Visit with Lion Anguiano M.D.   [facility] Neurology (--)    [address], [address]  [place] [address] [phone]           You will be receiving a confirmation call a few days before your appointment from our automated call confirmation system.            Aug 29, 2017  9:00 AM   VIDEO EEG MONITORING with NEURODIAGNOSTIC LAB [facility] Neu[facility] (--)    [address], [facility] [address]  [place] [address] [phone]           Limit caffeine. Clean, dry hair, no gels, oils, or hairsprays. If testing " for seizures or spells, please allow no more than 4 hours of sleep the night before the exam (sleep 12am-4am) Pre-authorization is typically required.              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) G40.209   5/5/2017 - Present    Migraine without aura and without status migrainosus, not intractable G43.009   5/5/2017 - Present      Health Maintenance        Date Due Completion Dates    IMM HEP B VACCINE (1 of 3 - Primary Series) 1989 ---    IMM HEP A VACCINE (1 of 2 - Standard Series) 6/27/1990 ---    IMM VARICELLA (CHICKENPOX) VACCINE (1 of 2 - 2 Dose Adolescent Series) 6/27/2002 ---    IMM DTaP/Tdap/Td Vaccine (1 - Tdap) 6/27/2008 ---    PAP SMEAR 3/8/2020 3/8/2017, 3/1/2016 (Done)    Override on 3/1/2016: Done (NL; GYN=farringer  ABNL with HPV 2011 with LEEP)            Current Immunizations     No immunizations on file.      Below and/or attached are the medications your provider expects you to take. Review all of your home medications and newly ordered medications with your provider and/or pharmacist. Follow medication instructions as directed by your provider and/or pharmacist. Please keep your medication list with you and share with your provider. Update the information when medications are discontinued, doses are changed, or new medications (including over-the-counter products) are added; and carry medication information at all times in the event of emergency situations     Allergies:  No Known Allergies          Medications  Valid as of: June 20, 2017 -  1:16 PM    Generic Name Brand Name Tablet Size Instructions for use    Aspirin-Acetaminophen-Caffeine (Tab) EXCEDRIN 250-250-65 MG Take 1 Tab by mouth every 6 hours as needed for  Headache.        Drospirenone-Ethinyl Estradiol (Tab) PB 3-0.03 MG TAKE 1 TABLET BY MOUTH DAILY        Ibuprofen (Tab) MOTRIN 200 MG Take 200 mg by mouth every 6 hours as needed.        LamoTRIgine (Kit) LamoTRIgine 25 & 50 & 100 MG Take 1 Tab by mouth every day. Take one tab daily, increasing dose, as directed in titration suman.        LevETIRAcetam (TABLET SR 24 HR) KEPPRA 500 MG 1 tab qhs for 4 days, then 2 tab qhs        .                 Medicines prescribed today were sent to:     [facility]/PHARMACY #[id number] - [place] - [address] [place] [address]    Phone: [phone] Fax: [phone]    Open 24 Hours?: No      Medication refill instructions:       If your prescription bottle indicates you have medication refills left, it is not necessary to call your provider’s office. Please contact your pharmacy and they will refill your medication.    If your prescription bottle indicates you do not have any refills left, you may request refills at any time through one of the following ways: The online [employer] system (except Urgent Care), by calling your provider’s office, or by asking your pharmacy to contact your provider’s office with a refill request. Medication refills are processed only during regular business hours and may not be available until the next business day. Your provider may request additional information or to have a follow-up visit with you prior to refilling your medication.   *Please Note: Medication refills are assigned a new Rx number when refilled electronically. Your pharmacy may indicate that no refills were authorized even though a new prescription for the same medication is available at the pharmacy. Please request the medicine by name with the pharmacy before contacting your provider for a refill.        Your To Do List     Future Labs/Procedures Complete By Expires    CBC WITH DIFFERENTIAL  As directed 6/20/2018    COMP METABOLIC PANEL  As directed 6/20/2018    CT-RENAL COLIC  EVALUATION(A/P W/O)  As directed 6/20/2018    ESTIMATED GFR  As directed 6/20/2018         [employer] Access Code: Activation code not generated  Current [employer] Status: Active

## 2017-06-20 NOTE — PROGRESS NOTES
Subjective:      Natasha Pino is a 26 y.o. female who presents with Leg Swelling            Leg Swelling  This is a new problem. Episode onset: she reports that her leg swelling started two days ago after she came back from [facility]. She feels like it is getting worse. She has also noticed she is recently more fatigued and short of breath. She is very active, so this is unusual for her. The problem has been gradually worsening. Associated symptoms include abdominal pain and fatigue. Pertinent negatives include no coughing, fever or vomiting. Associated symptoms comments: Reports pain in her right knee and pain with walking in both legs, like her legs are cramping. The symptoms are aggravated by walking. She has tried rest (elevating her legs) for the symptoms. The treatment provided no relief.       Review of Systems   Constitutional: Positive for malaise/fatigue and fatigue. Negative for fever.   Respiratory: Positive for shortness of breath. Negative for cough.    Cardiovascular: Positive for claudication and leg swelling.   Gastrointestinal: Positive for abdominal pain. Negative for vomiting, diarrhea and constipation.   Genitourinary: Negative for flank pain.   Musculoskeletal: Positive for joint pain (right knee).   All other systems reviewed and are negative.    Past Medical History   Diagnosis Date   • Hypertension      borderline.   • Kidney disease      inpatient-observation.   • Localization-related partial epilepsy with complex partial seizures (CMS-HCC) 5/5/2017   • Migraine without aura and without status migrainosus, not intractable 5/5/2017      Past Surgical History   Procedure Laterality Date   • Leep  2011   • Appendectomy        Social History     Social History   • Marital Status: Single     Spouse Name: N/A   • Number of Children: N/A   • Years of Education: N/A     Occupational History   • Not on file.     Social History Main Topics   • Smoking status: Never Smoker    •  "Smokeless tobacco: Never Used   • Alcohol Use: 0.0 oz/week     0 Standard drinks or equivalent per week      Comment: Socially   • Drug Use: No   • Sexual Activity:     Partners: Male     Birth Control/ Protection: Pill     Other Topics Concern   • Not on file     Social History Narrative          Objective:     /64 mmHg  Pulse 74  Temp(Src) 37.6 °C (99.7 °F)  Resp 14  Ht 1.626 m (5' 4\")  Wt 78.926 kg (174 lb)  BMI 29.85 kg/m2  SpO2 98%     Physical Exam   Constitutional: She is oriented to person, place, and time. Vital signs are normal. She appears well-developed and well-nourished.   HENT:   Head: Normocephalic and atraumatic.   Eyes: EOM are normal. Pupils are equal, round, and reactive to light.   Neck: Normal range of motion.   Cardiovascular: Normal rate and regular rhythm.    Pulmonary/Chest: Effort normal.   Abdominal: Normal appearance and bowel sounds are normal. There is no tenderness. There is no CVA tenderness.   Musculoskeletal: Normal range of motion.        Right ankle: She exhibits swelling. She exhibits normal pulse.        Left ankle: She exhibits swelling. She exhibits normal pulse.        Right lower leg: She exhibits edema.        Left lower leg: She exhibits edema.   2+ pitting edema  Pedal pulses strong  Mild sunburn noted to both legs   Neurological: She is alert and oriented to person, place, and time.   Skin: Skin is warm and dry.   Psychiatric: She has a normal mood and affect. Her speech is normal and behavior is normal. Thought content normal.   Vitals reviewed.         6/20/2017 5:08 PM    HISTORY/REASON FOR EXAM:  bilateral fluid retention.      TECHNIQUE/EXAM DESCRIPTION AND NUMBER OF VIEWS:  CT scan renal/colic without contrast.    Helical images of the abdomen and pelvis were obtained from the diaphragmatic domes through the pubic symphysis.    Low dose optimization technique was utilized for this CT exam including automated exposure control and adjustment of the mA " and/or kV according to patient size.    COMPARISON: None.    FINDINGS:  Evaluation of parenchymal and vascular structures is limited by the lack of intravenous contrast.    Lung bases:  The visualized lung bases are clear.    Abdomen:    There is no free air in the abdomen or pelvis. The unenhanced liver, gallbladder, spleen, adrenal glands are unremarkable. Pancreas is unremarkable. There is no biliary ductal dilatation. There is no evidence of hydronephrosis. The mid to distal ureters   are not well delineated. A 3 mm calcific density in the left pelvis could represent a phlebolith or distal ureteral calculus.    Aorta is not aneurysmal. There are small inguinal, retroperitoneal and mesenteric lymph nodes.    There is no evidence of bowel obstruction. Moderate amount of stool seen in the ascending colon. Terminal ileum is unremarkable. Surgical sutures are seen at the base of the cecum.    Bladder is moderately distended. A tampon is seen within the vagina. Small amount of nonspecific free fluid is seen in the pelvis.    Schmorl's nodes are seen in the lower thoracic spine.           Impression        3 mm calcific density in the left pelvis could represent a phlebolith or distal ureteral calculus.    No evidence of hydronephrosis.    Moderate amount of stool in the ascending colon.    Small amount of nonspecific free fluid in the pelvis.          Assessment/Plan:     1. Bilateral edema of lower extremity  - CBC WITH DIFFERENTIAL; Future  - COMP METABOLIC PANEL; Future  - ESTIMATED GFR; Future  - POCT Urinalysis  - CT-RENAL COLIC EVALUATION(A/P W/O); Future    2. SOB (shortness of breath)  3. Abdominal pain, unspecified location  - CT-RENAL COLIC EVALUATION(A/P W/O); Future    Called patient with test and lab results. Unfortunately there is not a good explanation for her symptoms as everything appears to be unremarkable. Advised her to use compression stockings, Tylenol and Ibuprofen and elevate her legs to help  with the swelling. If her pain and swelling persists, strict ER precautions were discussed. She V/U and agrees  Instructed to return to  or nearest emergency department if symptoms fail to improve, for any change in condition, further concerns, or new concerning symptoms.  Patient states understanding of the plan of care and discharge instructions.

## 2017-06-21 ENCOUNTER — HOSPITAL ENCOUNTER (EMERGENCY)
Facility: MEDICAL CENTER | Age: 27
End: 2017-06-21
Attending: EMERGENCY MEDICINE
Payer: COMMERCIAL

## 2017-06-21 VITALS
SYSTOLIC BLOOD PRESSURE: 138 MMHG | RESPIRATION RATE: 16 BRPM | HEIGHT: 64 IN | BODY MASS INDEX: 30.64 KG/M2 | WEIGHT: 179.45 LBS | HEART RATE: 83 BPM | TEMPERATURE: 99 F | DIASTOLIC BLOOD PRESSURE: 68 MMHG | OXYGEN SATURATION: 98 %

## 2017-06-21 DIAGNOSIS — L55.1 SUNBURN OF SECOND DEGREE: ICD-10-CM

## 2017-06-21 DIAGNOSIS — R60.0 PEDAL EDEMA: ICD-10-CM

## 2017-06-21 LAB
ALBUMIN SERPL BCP-MCNC: 4.2 G/DL (ref 3.2–4.9)
ALBUMIN/GLOB SERPL: 1.6 G/DL
ALP SERPL-CCNC: 50 U/L (ref 30–99)
ALT SERPL-CCNC: 27 U/L (ref 2–50)
ANION GAP SERPL CALC-SCNC: 7 MMOL/L (ref 0–11.9)
AST SERPL-CCNC: 16 U/L (ref 12–45)
BASOPHILS # BLD AUTO: 0.6 % (ref 0–1.8)
BASOPHILS # BLD: 0.04 K/UL (ref 0–0.12)
BILIRUB SERPL-MCNC: 0.4 MG/DL (ref 0.1–1.5)
BNP SERPL-MCNC: 31 PG/ML (ref 0–100)
BUN SERPL-MCNC: 12 MG/DL (ref 8–22)
CALCIUM SERPL-MCNC: 9.3 MG/DL (ref 8.5–10.5)
CHLORIDE SERPL-SCNC: 109 MMOL/L (ref 96–112)
CO2 SERPL-SCNC: 26 MMOL/L (ref 20–33)
CREAT SERPL-MCNC: 0.7 MG/DL (ref 0.5–1.4)
DEPRECATED D DIMER PPP IA-ACNC: 443 NG/ML(D-DU)
EOSINOPHIL # BLD AUTO: 0.1 K/UL (ref 0–0.51)
EOSINOPHIL NFR BLD: 1.6 % (ref 0–6.9)
ERYTHROCYTE [DISTWIDTH] IN BLOOD BY AUTOMATED COUNT: 41.6 FL (ref 35.9–50)
GFR SERPL CREATININE-BSD FRML MDRD: >60 ML/MIN/1.73 M 2
GLOBULIN SER CALC-MCNC: 2.7 G/DL (ref 1.9–3.5)
GLUCOSE SERPL-MCNC: 82 MG/DL (ref 65–99)
HCG SERPL QL: NEGATIVE
HCT VFR BLD AUTO: 39.1 % (ref 37–47)
HGB BLD-MCNC: 13.4 G/DL (ref 12–16)
IMM GRANULOCYTES # BLD AUTO: 0.02 K/UL (ref 0–0.11)
IMM GRANULOCYTES NFR BLD AUTO: 0.3 % (ref 0–0.9)
LYMPHOCYTES # BLD AUTO: 1.42 K/UL (ref 1–4.8)
LYMPHOCYTES NFR BLD: 22.7 % (ref 22–41)
MCH RBC QN AUTO: 30.9 PG (ref 27–33)
MCHC RBC AUTO-ENTMCNC: 34.3 G/DL (ref 33.6–35)
MCV RBC AUTO: 90.3 FL (ref 81.4–97.8)
MONOCYTES # BLD AUTO: 0.55 K/UL (ref 0–0.85)
MONOCYTES NFR BLD AUTO: 8.8 % (ref 0–13.4)
NEUTROPHILS # BLD AUTO: 4.12 K/UL (ref 2–7.15)
NEUTROPHILS NFR BLD: 66 % (ref 44–72)
NRBC # BLD AUTO: 0 K/UL
NRBC BLD AUTO-RTO: 0 /100 WBC
PLATELET # BLD AUTO: 332 K/UL (ref 164–446)
PMV BLD AUTO: 9.4 FL (ref 9–12.9)
POTASSIUM SERPL-SCNC: 4 MMOL/L (ref 3.6–5.5)
PROT SERPL-MCNC: 6.9 G/DL (ref 6–8.2)
RBC # BLD AUTO: 4.33 M/UL (ref 4.2–5.4)
SODIUM SERPL-SCNC: 142 MMOL/L (ref 135–145)
TSH SERPL DL<=0.005 MIU/L-ACNC: 1.24 UIU/ML (ref 0.3–3.7)
WBC # BLD AUTO: 6.3 K/UL (ref 4.8–10.8)

## 2017-06-21 PROCEDURE — 83880 ASSAY OF NATRIURETIC PEPTIDE: CPT

## 2017-06-21 PROCEDURE — 84443 ASSAY THYROID STIM HORMONE: CPT

## 2017-06-21 PROCEDURE — 99284 EMERGENCY DEPT VISIT MOD MDM: CPT

## 2017-06-21 PROCEDURE — 80053 COMPREHEN METABOLIC PANEL: CPT

## 2017-06-21 PROCEDURE — 85379 FIBRIN DEGRADATION QUANT: CPT

## 2017-06-21 PROCEDURE — 93970 EXTREMITY STUDY: CPT | Mod: 26 | Performed by: SURGERY

## 2017-06-21 PROCEDURE — 93970 EXTREMITY STUDY: CPT

## 2017-06-21 PROCEDURE — 84703 CHORIONIC GONADOTROPIN ASSAY: CPT

## 2017-06-21 PROCEDURE — 85025 COMPLETE CBC W/AUTO DIFF WBC: CPT

## 2017-06-21 RX ORDER — HYDROCHLOROTHIAZIDE 25 MG/1
25 TABLET ORAL DAILY
Qty: 30 TAB | Refills: 1 | Status: SHIPPED | OUTPATIENT
Start: 2017-06-21 | End: 2017-08-01

## 2017-06-21 NOTE — ED AVS SNAPSHOT
Home Care Instructions                                                                                                                Natasha Pino   MRN: [id number]    Department:  [facility], Emergency Dept   Date of Visit:  6/21/2017            [facility], Emergency Dept    [address]    [facility] [address]    Phone:  [phone]      You were seen by     Chapo Arroyo M.D.      Your Diagnosis Was     Pedal edema     R60.0       Follow-up Information     1. Follow up with AMADEO Wakefield In 2 days.    Specialty:  Family Medicine    Why:  As needed, If symptoms worsen    Contact information    [address] [facility] [address]  [address]  [facility] [address] [phone]        Medication Information     Review all of your home medications and newly ordered medications with your primary doctor and/or pharmacist as soon as possible. Follow medication instructions as directed by your doctor and/or pharmacist.     Please keep your complete medication list with you and share with your physician. Update the information when medications are discontinued, doses are changed, or new medications (including over-the-counter products) are added; and carry medication information at all times in the event of emergency situations.               Medication List      START taking these medications        Instructions    Morning Afternoon Evening Bedtime    * hydrochlorothiazide 25 MG Tabs   Commonly known as:  HYDRODIURIL        Take 1 Tab by mouth every day.   Dose:  25 mg                        * hydrochlorothiazide 25 MG Tabs   Commonly known as:  HYDRODIURIL        Take 1 Tab by mouth every day.   Dose:  25 mg                        * Notice:  This list has 2 medication(s) that are the same as other medications prescribed for you. Read the directions carefully, and ask your doctor or other care provider to review them with you.      ASK your doctor about these medications        Instructions    Morning Afternoon Evening Bedtime    asa/apap/caffeine 250-250-65 MG Tabs   Commonly known as:  EXCEDRIN        Take 1 Tab by mouth every 6 hours as needed for Headache.   Dose:  1 Tab                        drospirenone-ethinyl estradiol 3-0.03 MG per tablet   Commonly known as:  PB        TAKE 1 TABLET BY MOUTH DAILY                        ibuprofen 200 MG Tabs   Commonly known as:  MOTRIN        Take 200 mg by mouth every 6 hours as needed.   Dose:  200 mg                        LamoTRIgine 25 & 50 & 100 MG Kit        Take 1 Tab by mouth every day. Take one tab daily, increasing dose, as directed in titration suman.   Dose:  1 Tab                        levetiracetam 500 MG Tb24   Commonly known as:  KEPPRA XR        1 tab qhs for 4 days, then 2 tab qhs                             Where to Get Your Medications      These medications were sent to [id number] - [place] - [address]  [address], [place] [address]     Phone:  [phone]    - hydrochlorothiazide 25 MG Tabs      You can get these medications from any pharmacy     Bring a paper prescription for each of these medications    - hydrochlorothiazide 25 MG Tabs            Procedures and tests performed during your visit     BTYPE NATRIURETIC PEPTIDE    CBC WITH DIFFERENTIAL    COMP METABOLIC PANEL    D-DIMER    ESTIMATED GFR    HCG QUAL SERUM    LE VENOUS DUPLEX (Specify in Comments Left, Right Or Bilateral)    SALINE LOCK    TSH        Discharge Instructions       Peripheral Edema  You have swelling in your legs (peripheral edema). This swelling is due to excess accumulation of salt and water in your body. Edema may be a sign of heart, kidney or liver disease, or a side effect of a medication. It may also be due to problems in the leg veins. Elevating your legs and using special support stockings may be very helpful, if the cause of the swelling is due to poor venous circulation. Avoid long periods of standing, whatever the  cause.  Treatment of edema depends on identifying the cause. Chips, pretzels, pickles and other salty foods should be avoided. Restricting salt in your diet is almost always needed. Water pills (diuretics) are often used to remove the excess salt and water from your body via urine. These medicines prevent the kidney from reabsorbing sodium. This increases urine flow.  Diuretic treatment may also result in lowering of potassium levels in your body. Potassium supplements may be needed if you have to use diuretics daily. Daily weights can help you keep track of your progress in clearing your edema. You should call your caregiver for follow up care as recommended.  SEEK IMMEDIATE MEDICAL CARE IF:   · You have increased swelling, pain, redness, or heat in your legs.  · You develop shortness of breath, especially when lying down.  · You develop chest or abdominal pain, weakness, or fainting.  · You have a fever.     This information is not intended to replace advice given to you by your health care provider. Make sure you discuss any questions you have with your health care provider.     Document Released: 01/25/2006 Document Revised: 03/11/2013 Document Reviewed: 01/05/2011  [employer] Interactive Patient Education ©2016 [employer]            Patient Information     Patient Information    Following emergency treatment: all patient requiring follow-up care must return either to a private physician or a clinic if your condition worsens before you are able to obtain further medical attention, please return to the emergency room.     Billing Information    At [facility], we work to make the billing process streamlined for our patients.  Our Representatives are here to answer any questions you may have regarding your hospital bill.  If you have insurance coverage and have supplied your insurance information to us, we will submit a claim to your insurer on your behalf.  Should you have any questions regarding your bill, we  can be reached online or by phone as follows:  Online: You are able pay your bills online or live chat with our representatives about any billing questions you may have. We are here to help Monday - Friday from 8:00am to 7:30pm and 9:00am - 12:00pm on Saturdays.  Please visit [email or web address]  for more information.   Phone:  [phone] or [phone]    Please note that your emergency physician, surgeon, pathologist, radiologist, anesthesiologist, and other specialists are not employed by [facility] and will therefore bill separately for their services.  Please contact them directly for any questions concerning their bills at the numbers below:     Emergency Physician Services:  [phone]  [facility]:  [phone]  [facility]:  [phone]  [facility]:  [phone]    1. Your final bill may vary from the amount quoted upon discharge if all procedures are not complete at that time, or if your doctor has additional procedures of which we are not aware. You will receive an additional bill if you return to the Emergency Department at [facility] for suture removal regardless of the facility of which the sutures were placed.     2. Please arrange for settlement of this account at the emergency registration.    3. All self-pay accounts are due in full at the time of treatment.  If you are unable to meet this obligation then payment is expected within 4-5 days.     4. If you have had radiology studies (CT, X-ray, Ultrasound, MRI), you have received a preliminary result during your emergency department visit. Please contact the radiology department [phone] to receive a copy of your final result. Please discuss the Final result with your primary physician or with the follow up physician provided.     Crisis Hotline:  [place] Crisis Hotline:  8-302-TZBFAGT or 1-357.117.5821  [place] Crisis Hotline:    1-489.547.2735 or  355.556.5743         ED Discharge Follow Up Questions    1. In order to provide you with very good care, we would like to follow up with a phone call in the next few days.  May we have your permission to contact you?     YES /  NO    2. What is the best phone number to call you? (       )_____-__________    3. What is the best time to call you?      Morning  /  Afternoon  /  Evening                   Patient Signature:  ____________________________________________________________    Date:  ____________________________________________________________      Your appointments     Jul 11, 2017  9:40 AM   Follow Up Visit with Lino Anguiano M.D.   [facility] (--)    [address], [facility] [address]  [facility] [address]   [phone]           You will be receiving a confirmation call a few days before your appointment from our automated call confirmation system.            Aug 29, 2017  9:00 AM   VIDEO EEG MONITORING with NEURODIAGNOSTIC LAB [facility] (--)    [address], [address]  [facility] [address]   [phone]           Limit caffeine. Clean, dry hair, no gels, oils, or hairsprays. If testing for seizures or spells, please allow no more than 4 hours of sleep the night before the exam (sleep 12am-4am) Pre-authorization is typically required.

## 2017-06-21 NOTE — ED NOTES
"Chief Complaint   Patient presents with   • Leg Swelling     Starting sunday night pt started having swelling starting in her hips down to her feet.      Pt reporting pain in bilateral feet and behind her right knee. Denies any diet changes. Denies decreased urination. Seen at  and kidney fx was checked per pt and it was fine.  Pt states her BLE feel numb. Color of legs and feet pink.   /72 mmHg  Pulse 88  Temp(Src) 37.2 °C (99 °F)  Resp 17  Ht 1.626 m (5' 4.02\")  Wt 81.4 kg (179 lb 7.3 oz)  BMI 30.79 kg/m2  SpO2 98%.  Pt placed back in lobby, educated on triage process, and told to inform staff of any change in condition.     "

## 2017-06-21 NOTE — ED AVS SNAPSHOT
6/21/2017    Natasha Pino  [address] [place] [address]    Dear Natasha:    [facility] wants to ensure your discharge home is safe and you or your loved ones have had all of your questions answered regarding your care after you leave the hospital.    Below is a list of resources and contact information should you have any questions regarding your hospital stay, follow-up instructions, or active medical symptoms.    Questions or Concerns Regarding… Contact   Medical Questions Related to Your Discharge  (7 days a week, 8am-5pm) Contact a Nurse Care Coordinator   [phone]   Medical Questions Not Related to Your Discharge  (24 hours a day / 7 days a week)  Contact the Nurse Health Line   [phone]    Medications or Discharge Instructions Refer to your discharge packet   or contact your [facility] Primary Care Provider   [phone]   Follow-up Appointment(s) Schedule your appointment via [employer]   or contact Scheduling [phone]   Billing Review your statement via [employer]  or contact Billing [phone]   Medical Records Review your records via [employer]   or contact Medical Records [phone]     You may receive a telephone call within two days of discharge. This call is to make certain you understand your discharge instructions and have the opportunity to have any questions answered. You can also easily access your medical information, test results and upcoming appointments via the [employer] free online health management tool. You can learn more and sign up at [email or web address]. For assistance setting up your [employer] account, please call [phone].    Once again, we want to ensure your discharge home is safe and that you have a clear understanding of any next steps in your care. If you have any questions or concerns, please do not hesitate to contact us, we are here for you. Thank you for choosing [facility] for your healthcare needs.    Sincerely,    Your [facility] Healthcare Team

## 2017-06-21 NOTE — ED PROVIDER NOTES
ED Provider Note    CHIEF COMPLAINT  Chief Complaint   Patient presents with   • Leg Swelling     Starting sunday night pt started having swelling starting in her hips down to her feet.        HPI  Natasha Pino is a 26 y.o. female who presents for evaluation of 72 hours of progressive worsening lower extremity swelling and edema. The patient has history of borderline hypertension but is otherwise healthy other than a history of seizures. No new medications. She does not necessarily report leg pain but does report some shortness of breath. She was seen in urgent care yesterday and extensive workup including basic blood tests. Her kidney function and liver function was normal. She had a CT scan of the abdomen and pelvis without contrast which did not demonstrate any significant findings. She reports that the swelling got worse and then she developed shortness of breath but denies chest pain. No night sweats weight loss and numbness weakness but she does report some tingling to her bilateral feet. Of note the patient did recently expose himself to significant son and does have a sunburn from the waist down to her feet    REVIEW OF SYSTEMS  See HPI for further details. No high fevers hemoptysis pleuritic chest pain night sweats fever back pain All other systems are negative.     PAST MEDICAL HISTORY  Past Medical History   Diagnosis Date   • Hypertension      borderline.   • Kidney disease      inpatient-observation.   • Localization-related partial epilepsy with complex partial seizures (CMS-HCC) 5/5/2017   • Migraine without aura and without status migrainosus, not intractable 5/5/2017       FAMILY HISTORY  History of kidney disease    SOCIAL HISTORY  Social History     Social History   • Marital Status: Single     Spouse Name: N/A   • Number of Children: N/A   • Years of Education: N/A     Social History Main Topics   • Smoking status: Never Smoker    • Smokeless tobacco: Never Used   • Alcohol Use: 0.0  "oz/week     0 Standard drinks or equivalent per week      Comment: Socially   • Drug Use: No   • Sexual Activity:     Partners: Male     Birth Control/ Protection: Pill     Other Topics Concern   • None     Social History Narrative     Nonsmoker no IV drugs  SURGICAL HISTORY  Past Surgical History   Procedure Laterality Date   • Leep  2011   • Appendectomy         CURRENT MEDICATIONS  No current facility-administered medications for this encounter.    Current outpatient prescriptions:   •  levetiracetam (KEPPRA XR) 500 MG TABLET SR 24 HR, 1 tab qhs for 4 days, then 2 tab qhs, Disp: 60 Tab, Rfl: 1  •  ibuprofen (MOTRIN) 200 MG Tab, Take 200 mg by mouth every 6 hours as needed., Disp: , Rfl:   •  asa/apap/caffeine (EXCEDRIN) 250-250-65 MG Tab, Take 1 Tab by mouth every 6 hours as needed for Headache., Disp: , Rfl:   •  LamoTRIgine 25 & 50 & 100 MG Kit, Take 1 Tab by mouth every day. Take one tab daily, increasing dose, as directed in titration suman., Disp: 1 Kit, Rfl: 0  •  drospirenone-ethinyl estradiol (PB) 3-0.03 MG per tablet, TAKE 1 TABLET BY MOUTH DAILY, Disp: , Rfl: 12      ALLERGIES  No Known Allergies    PHYSICAL EXAM  VITAL SIGNS: /72 mmHg  Pulse 88  Temp(Src) 37.2 °C (99 °F)  Resp 17  Ht 1.626 m (5' 4.02\")  Wt 81.4 kg (179 lb 7.3 oz)  BMI 30.79 kg/m2  SpO2 98% Room air O2: 98    Constitutional: Well developed, Well nourished, No acute distress, Non-toxic appearance.   HENT: Normocephalic, Atraumatic, Bilateral external ears normal, Oropharynx moist, No oral exudates, Nose normal.   Eyes: PERRLA, EOMI, Conjunctiva normal, No discharge.   Neck: Normal range of motion, No tenderness, Supple, No stridor.   Cardiovascular: Normal heart rate, Normal rhythm, No murmurs, No rubs, No gallops.   Thorax & Lungs: Normal breath sounds, No respiratory distress, No wheezing, No chest tenderness.   Abdomen: Bowel sounds normal, Soft, second-degree sunburn from the waist down to the dorsum of the feet Warm, " Dry, No erythema, No rash.   Back: No tenderness, No CVA tenderness.   Extremities: Intact distal pulses, bilateral 3+ pitting edema from the dorsum of the feet all the way up to the mid thigh negative Homans sign distal neurovascular exam is normal.   Neurologic: Alert & oriented x 3, Normal motor function, Normal sensory function, No focal deficits noted.   Psychiatric: Affect normal, Judgment normal, Mood normal.     Results for orders placed or performed during the hospital encounter of 06/21/17   HCG QUAL SERUM   Result Value Ref Range    Beta-Hcg Qualitative Serum Negative Negative   COMP METABOLIC PANEL   Result Value Ref Range    Sodium 142 135 - 145 mmol/L    Potassium 4.0 3.6 - 5.5 mmol/L    Chloride 109 96 - 112 mmol/L    Co2 26 20 - 33 mmol/L    Anion Gap 7.0 0.0 - 11.9    Glucose 82 65 - 99 mg/dL    Bun 12 8 - 22 mg/dL    Creatinine 0.70 0.50 - 1.40 mg/dL    Calcium 9.3 8.5 - 10.5 mg/dL    AST(SGOT) 16 12 - 45 U/L    ALT(SGPT) 27 2 - 50 U/L    Alkaline Phosphatase 50 30 - 99 U/L    Total Bilirubin 0.4 0.1 - 1.5 mg/dL    Albumin 4.2 3.2 - 4.9 g/dL    Total Protein 6.9 6.0 - 8.2 g/dL    Globulin 2.7 1.9 - 3.5 g/dL    A-G Ratio 1.6 g/dL   CBC WITH DIFFERENTIAL   Result Value Ref Range    WBC 6.3 4.8 - 10.8 K/uL    RBC 4.33 4.20 - 5.40 M/uL    Hemoglobin 13.4 12.0 - 16.0 g/dL    Hematocrit 39.1 37.0 - 47.0 %    MCV 90.3 81.4 - 97.8 fL    MCH 30.9 27.0 - 33.0 pg    MCHC 34.3 33.6 - 35.0 g/dL    RDW 41.6 35.9 - 50.0 fL    Platelet Count 332 164 - 446 K/uL    MPV 9.4 9.0 - 12.9 fL    Neutrophils-Polys 66.00 44.00 - 72.00 %    Lymphocytes 22.70 22.00 - 41.00 %    Monocytes 8.80 0.00 - 13.40 %    Eosinophils 1.60 0.00 - 6.90 %    Basophils 0.60 0.00 - 1.80 %    Immature Granulocytes 0.30 0.00 - 0.90 %    Nucleated RBC 0.00 /100 WBC    Neutrophils (Absolute) 4.12 2.00 - 7.15 K/uL    Lymphs (Absolute) 1.42 1.00 - 4.80 K/uL    Monos (Absolute) 0.55 0.00 - 0.85 K/uL    Eos (Absolute) 0.10 0.00 - 0.51 K/uL    Baso  (Absolute) 0.04 0.00 - 0.12 K/uL    Immature Granulocytes (abs) 0.02 0.00 - 0.11 K/uL    NRBC (Absolute) 0.00 K/uL   D-DIMER   Result Value Ref Range    D-Dimer Screen 443 (H) <250 ng/mL(D-DU)   BTYPE NATRIURETIC PEPTIDE   Result Value Ref Range    B Natriuretic Peptide 31 0 - 100 pg/mL   TSH   Result Value Ref Range    TSH 1.240 0.300 - 3.700 uIU/mL   ESTIMATED GFR   Result Value Ref Range    GFR If African American >60 >60 mL/min/1.73 m 2    GFR If Non African American >60 >60 mL/min/1.73 m 2      LE VENOUS DUPLEX (Specify in Comments Left, Right Or Bilateral)    (Results Pending)     Bilateral lower extremity DVT studies are negative        COURSE & MEDICAL DECISION MAKING  Pertinent Labs & Imaging studies reviewed. (See chart for details)  Patient presents here with isolated leg swelling. Extensive workup was performed. Her thyroid studies BNP albumin were normal. No evidence of renal insufficiency. Bilateral lower extremity ultrasounds were both negative. D-dimer was marginally elevated and she did not have any chest pain or shortness of breath to suggest pulmonary embolism. She also had a sunburn which could obviously cause some edema. She was slightly hypertensive here. I will start her on hydrochlorothiazide and recommend she follow up with her PCP    FINAL IMPRESSION  1. Secondary sunburn lower extremities  2. Pedal edema         Electronically signed by: Chapo Arroyo, 6/21/2017 12:08 PM

## 2017-06-21 NOTE — ED AVS SNAPSHOT
CartiCure Access Code: Activation code not generated  Current CartiCure Status: Active    MOMENTFACE SROhart  A secure, online tool to manage your health information     [employer]’s CartiCure® is a secure, online tool that connects you to your personalized health information from the privacy of your home -- day or night - making it very easy for you to manage your healthcare. Once the activation process is completed, you can even access your medical information using the CartiCure sonny, which is available for free in the Apple Sonny store or Google Play store.     CartiCure provides the following levels of access (as shown below):   My Chart Features   [facility] Primary Care Doctor [facility]  Specialists [facility]  Urgent  Care Non-[facility]  Primary Care  Doctor   Email your healthcare team securely and privately 24/7 X X X X   Manage appointments: schedule your next appointment; view details of past/upcoming appointments X      Request prescription refills. X      View recent personal medical records, including lab and immunizations X X X X   View health record, including health history, allergies, medications X X X X   Read reports about your outpatient visits, procedures, consult and ER notes X X X X   See your discharge summary, which is a recap of your hospital and/or ER visit that includes your diagnosis, lab results, and care plan. X X       How to register for CartiCure:  1. Go to  [email or web address].  2. Click on the Sign Up Now box, which takes you to the New Member Sign Up page. You will need to provide the following information:  a. Enter your CartiCure Access Code exactly as it appears at the top of this page. (You will not need to use this code after you’ve completed the sign-up process. If you do not sign up before the expiration date, you must request a new code.)   b. Enter your date of birth.   c. Enter your home email address.   d. Click Submit, and follow the next screen’s instructions.  3. Create a CartiCure ID. This will  be your Magic Wheels login ID and cannot be changed, so think of one that is secure and easy to remember.  4. Create a Magic Wheels password. You can change your password at any time.  5. Enter your Password Reset Question and Answer. This can be used at a later time if you forget your password.   6. Enter your e-mail address. This allows you to receive e-mail notifications when new information is available in Magic Wheels.  7. Click Sign Up. You can now view your health information.    For assistance activating your Magic Wheels account, call [phone]

## 2017-06-21 NOTE — DISCHARGE INSTRUCTIONS
Peripheral Edema  You have swelling in your legs (peripheral edema). This swelling is due to excess accumulation of salt and water in your body. Edema may be a sign of heart, kidney or liver disease, or a side effect of a medication. It may also be due to problems in the leg veins. Elevating your legs and using special support stockings may be very helpful, if the cause of the swelling is due to poor venous circulation. Avoid long periods of standing, whatever the cause.  Treatment of edema depends on identifying the cause. Chips, pretzels, pickles and other salty foods should be avoided. Restricting salt in your diet is almost always needed. Water pills (diuretics) are often used to remove the excess salt and water from your body via urine. These medicines prevent the kidney from reabsorbing sodium. This increases urine flow.  Diuretic treatment may also result in lowering of potassium levels in your body. Potassium supplements may be needed if you have to use diuretics daily. Daily weights can help you keep track of your progress in clearing your edema. You should call your caregiver for follow up care as recommended.  SEEK IMMEDIATE MEDICAL CARE IF:   · You have increased swelling, pain, redness, or heat in your legs.  · You develop shortness of breath, especially when lying down.  · You develop chest or abdominal pain, weakness, or fainting.  · You have a fever.     This information is not intended to replace advice given to you by your health care provider. Make sure you discuss any questions you have with your health care provider.     Document Released: 01/25/2006 Document Revised: 03/11/2013 Document Reviewed: 01/05/2011  [employer] Interactive Patient Education ©2016 [employer]

## 2017-07-17 ENCOUNTER — OFFICE VISIT (OUTPATIENT)
Dept: MEDICAL GROUP | Facility: CLINIC | Age: 27
End: 2017-07-17
Payer: COMMERCIAL

## 2017-07-17 VITALS
SYSTOLIC BLOOD PRESSURE: 112 MMHG | RESPIRATION RATE: 16 BRPM | WEIGHT: 172 LBS | HEIGHT: 64 IN | HEART RATE: 70 BPM | TEMPERATURE: 98.3 F | BODY MASS INDEX: 29.37 KG/M2 | OXYGEN SATURATION: 98 % | DIASTOLIC BLOOD PRESSURE: 68 MMHG

## 2017-07-17 DIAGNOSIS — F32.9 MAJOR DEPRESSIVE DISORDER WITH SINGLE EPISODE, REMISSION STATUS UNSPECIFIED: ICD-10-CM

## 2017-07-17 PROCEDURE — 99213 OFFICE O/P EST LOW 20 MIN: CPT | Performed by: NURSE PRACTITIONER

## 2017-07-17 RX ORDER — FLUOXETINE 10 MG/1
10 CAPSULE ORAL DAILY
Qty: 30 CAP | Refills: 3 | Status: SHIPPED | OUTPATIENT
Start: 2017-07-17 | End: 2017-12-11

## 2017-07-17 ASSESSMENT — PATIENT HEALTH QUESTIONNAIRE - PHQ9
CLINICAL INTERPRETATION OF PHQ2 SCORE: 6
5. POOR APPETITE OR OVEREATING: 2 - MORE THAN HALF THE DAYS
SUM OF ALL RESPONSES TO PHQ QUESTIONS 1-9: 21

## 2017-07-17 ASSESSMENT — ENCOUNTER SYMPTOMS
SEIZURES: 0
DIZZINESS: 0
DEPRESSION: 1
HEADACHES: 0
PALPITATIONS: 0
FEVER: 0
NERVOUS/ANXIOUS: 0
CHILLS: 0
BLURRED VISION: 0
INSOMNIA: 1
DOUBLE VISION: 0
WEAKNESS: 1

## 2017-07-17 NOTE — MR AVS SNAPSHOT
"        Natasha Pino   2017 2:20 PM   Office Visit   MRN: [id number]    Department:  [facility]   Dept Phone:  [phone]    Description:  Female : 1990   Provider:  AMADEO Fenton           Reason for Visit     Medication Reaction feeling depressed/ crying/ fear about not being here x 1 weeks       Allergies as of 2017     No Known Allergies      You were diagnosed with     Major depressive disorder with single episode, remission status unspecified (CMS-[facility])   [[id number]]         Vital Signs     Blood Pressure Pulse Temperature Respirations Height Weight    112/68 mmHg 70 36.8 °C (98.3 °F) 16 1.626 m (5' 4.02\") 78.019 kg (172 lb)    Body Mass Index Oxygen Saturation Last Menstrual Period Breastfeeding? Smoking Status       29.51 kg/m2 98% 07/15/2017 No Never Smoker        Basic Information     Date Of Birth Sex Race Ethnicity Preferred Language    1990 Female White Non- English      Your appointments     Aug 29, 2017  9:00 AM   VIDEO EEG MONITORING with NEURODIAGNOSTIC LAB [facility] (--)    [address], [address]  [facility] [id number] [phone]           Limit caffeine. Clean, dry hair, no gels, oils, or hairsprays. If testing for seizures or spells, please allow no more than 4 hours of sleep the night before the exam (sleep 12am-4am) Pre-authorization is typically required.              Problem List              ICD-10-CM Priority Class Noted - Resolved    Obesity (BMI 30-39.9) E66.9   2017 - Present    Uses birth control-OCPs Z30.9   2017 - Present    Syncope and collapse R55   2017 - Present    Localization-related partial epilepsy with complex partial seizures (CMS-HCC) G40.209   2017 - Present    Migraine without aura and without status migrainosus, not intractable G43.009   2017 - Present    Major depressive disorder F32.9   2017 - Present      Health Maintenance        Date Due Completion " Dates    IMM DTaP/Tdap/Td Vaccine (1 - Tdap) 6/27/2008 ---    IMM INFLUENZA (1) 9/1/2017 ---    PAP SMEAR 3/8/2020 3/8/2017, 3/1/2016 (Done)    Override on 3/1/2016: Done (NL; GYN=farringer  ABNL with HPV 2011 with LEEP)            Current Immunizations     No immunizations on file.      Below and/or attached are the medications your provider expects you to take. Review all of your home medications and newly ordered medications with your provider and/or pharmacist. Follow medication instructions as directed by your provider and/or pharmacist. Please keep your medication list with you and share with your provider. Update the information when medications are discontinued, doses are changed, or new medications (including over-the-counter products) are added; and carry medication information at all times in the event of emergency situations     Allergies:  No Known Allergies          Medications  Valid as of: July 17, 2017 -  2:46 PM    Generic Name Brand Name Tablet Size Instructions for use    Aspirin-Acetaminophen-Caffeine (Tab) EXCEDRIN 250-250-65 MG Take 1 Tab by mouth every 6 hours as needed for Headache.        Drospirenone-Ethinyl Estradiol (Tab) PB 3-0.03 MG TAKE 1 TABLET BY MOUTH DAILY        FLUoxetine HCl (Cap) PROZAC 10 MG Take 1 Cap by mouth every day.        HydroCHLOROthiazide (Tab) HYDRODIURIL 25 MG Take 1 Tab by mouth every day.        HydroCHLOROthiazide (Tab) HYDRODIURIL 25 MG Take 1 Tab by mouth every day.        Ibuprofen (Tab) MOTRIN 200 MG Take 200 mg by mouth every 6 hours as needed.        LamoTRIgine (Kit) LamoTRIgine 25 & 50 & 100 MG Take 1 Tab by mouth every day. Take one tab daily, increasing dose, as directed in titration suman.        LevETIRAcetam (TABLET SR 24 HR) KEPPRA 500 MG 1 tab qhs for 4 days, then 2 tab qhs        .                 Medicines prescribed today were sent to:     [id number] - [place] - [address] [address]    Phone: [phone]  Fax: [phone]    Open 24 Hours?: No      Medication refill instructions:       If your prescription bottle indicates you have medication refills left, it is not necessary to call your provider’s office. Please contact your pharmacy and they will refill your medication.    If your prescription bottle indicates you do not have any refills left, you may request refills at any time through one of the following ways: The online [employer] SARL system (except Urgent Care), by calling your provider’s office, or by asking your pharmacy to contact your provider’s office with a refill request. Medication refills are processed only during regular business hours and may not be available until the next business day. Your provider may request additional information or to have a follow-up visit with you prior to refilling your medication.   *Please Note: Medication refills are assigned a new Rx number when refilled electronically. Your pharmacy may indicate that no refills were authorized even though a new prescription for the same medication is available at the pharmacy. Please request the medicine by name with the pharmacy before contacting your provider for a refill.        Referral     A referral request has been sent to our patient care coordination department. Please allow 3-5 business days for us to process this request and contact you either by phone or mail. If you do not hear from us by the 5th business day, please call us at [phone].           [employer] Access Code: Activation code not generated  Current [employer] SAR[employer] Status: Active

## 2017-07-17 NOTE — PROGRESS NOTES
Chief Complaint   Patient presents with   • Medication Reaction     feeling depressed/ crying/ fear about not being here x 1 weeks        HISTORY OF PRESENT ILLNESS: Patient is a 27 y.o. female established patient who presents today due to increased depression last week that she has been crying and fear about not being here for her son. She has a four year old who is going to day care while she is working during the day time. She lives with her  and her son.  has to work at night time. Pt denied any life change or new stressful event other than the seizure which is actually controlled at this time with keppra. She starts keppra about 4 weeks ago without any problem. She was unable to tolerate lamotrigine due to nausea and neurologist switched that to keppra on 5/19 when she last saw Neurologist Dr. Anguiano. She did call neurologist before coming today but there is no available appointment until October. She is scheduled to have VEEG on 8/29. Her depression score (PHQ-9) is 21. She has no know hx of depression in the past. She has no known chronic disease other than seizure and migraine. Migraine has not bothered her recently. She denied any cardiac or rhythm disease. Her most recent lab result is reviewed, unremarkable, TSH wnl on 6/21. She has no suicidal ideation. She has occasionally insomnia but not everyday.      Patient Active Problem List    Diagnosis Date Noted   • Major depressive disorder 07/17/2017   • Localization-related partial epilepsy with complex partial seizures (CMS-HCC) 05/05/2017   • Migraine without aura and without status migrainosus, not intractable 05/05/2017   • Obesity (BMI 30-39.9) 02/13/2017   • Uses birth control-OCPs 02/13/2017   • Syncope and collapse 02/13/2017       Allergies:Review of patient's allergies indicates no known allergies.    Current Outpatient Prescriptions   Medication Sig Dispense Refill   • fluoxetine (PROZAC) 10 MG Cap Take 1 Cap by mouth every day. 30  "Cap 3   • levetiracetam (KEPPRA XR) 500 MG TABLET SR 24 HR 1 tab qhs for 4 days, then 2 tab qhs 60 Tab 1   • ibuprofen (MOTRIN) 200 MG Tab Take 200 mg by mouth every 6 hours as needed.     • asa/apap/caffeine (EXCEDRIN) 250-250-65 MG Tab Take 1 Tab by mouth every 6 hours as needed for Headache.     • hydrochlorothiazide (HYDRODIURIL) 25 MG Tab Take 1 Tab by mouth every day. 30 Tab 1   • hydrochlorothiazide (HYDRODIURIL) 25 MG Tab Take 1 Tab by mouth every day. 30 Tab 1   • LamoTRIgine 25 & 50 & 100 MG Kit Take 1 Tab by mouth every day. Take one tab daily, increasing dose, as directed in titration suman. 1 Kit 0   • drospirenone-ethinyl estradiol (PB) 3-0.03 MG per tablet TAKE 1 TABLET BY MOUTH DAILY  12     No current facility-administered medications for this visit.       Social History   Substance Use Topics   • Smoking status: Never Smoker    • Smokeless tobacco: Never Used   • Alcohol Use: 0.0 oz/week     0 Standard drinks or equivalent per week      Comment: Socially       Family Status   Relation Status Death Age   • Mother Alive    • Father Alive    • Sister Alive    • Brother Alive    • Maternal Grandmother     • Maternal Grandfather Alive    • Paternal Grandmother     • Paternal Grandfather Alive    • Sister Alive      Family History   Problem Relation Age of Onset   • Cancer Mother      ovarian CA  and cervica CA (n her mid 30s)   • Hyperlipidemia Father    • Heart Attack Father      twice   • Seizures Sister    • GI Sister      \"intestinal problem\"   • Diabetes Maternal Grandmother          ROS:  Review of Systems   Constitutional: Positive for malaise/fatigue. Negative for fever and chills.   Eyes: Negative for blurred vision and double vision.   Cardiovascular: Negative for chest pain, palpitations and leg swelling.   Neurological: Positive for weakness. Negative for dizziness, seizures ( no seizure since on keppra) and headaches.   Psychiatric/Behavioral: Positive for depression. " "Negative for suicidal ideas. The patient has insomnia (occasionally). The patient is not nervous/anxious.         Objective:     Blood pressure 112/68, pulse 70, temperature 36.8 °C (98.3 °F), resp. rate 16, height 1.626 m (5' 4.02\"), weight 78.019 kg (172 lb), last menstrual period 07/15/2017, SpO2 98 %, not currently breastfeeding.     Physical Exam:  Physical Exam   Constitutional: She is oriented to person, place, and time and well-developed, well-nourished, and in no distress.   HENT:   Head: Normocephalic and atraumatic.   Eyes: Conjunctivae are normal.   Neck: Neck supple. No JVD present.   Cardiovascular: Normal rate.    Pulmonary/Chest: Effort normal. No respiratory distress.   Musculoskeletal: Normal range of motion. She exhibits no edema or tenderness.   Neurological: She is alert and oriented to person, place, and time. Gait normal.   Skin: Skin is warm.   Psychiatric:   Down but behave appropriately. Able to answer questions accordingly   Vitals reviewed.        Assessment/Plan:  1. Major depressive disorder with single episode, remission status unspecified (CMS-HCC)  - fluoxetine (PROZAC) 10 MG Cap; Take 1 Cap by mouth every day.  Dispense: 30 Cap; Refill: 3 (6/21 lab reviewed, normal LFT)  - REFERRAL TO PSYCHIATRY  - Patient has been identified as being depressed and appropriate orders and counseling have been given  - MA has left message to neurologist to request appointment to follow up her keppra since which could cause depression. No suicidal ideation at this time.    Differential diagnoses and indications for immediate follow-up discussed with patient.    Instructed to return to clinic or nearest emergency department for any change in condition, further concerns, or worsening of symptoms.    The patient demonstrated a good understanding and agreed with the treatment plan.         "

## 2017-07-18 ENCOUNTER — TELEPHONE (OUTPATIENT)
Dept: NEUROLOGY | Facility: MEDICAL CENTER | Age: 27
End: 2017-07-18

## 2017-07-18 ENCOUNTER — TELEPHONE (OUTPATIENT)
Dept: MEDICAL GROUP | Facility: CLINIC | Age: 27
End: 2017-07-18

## 2017-07-18 NOTE — TELEPHONE ENCOUNTER
Kristina from Dr Anguiano's office called and stated she will send message to Dr Anguiano, along with Chrissy Rodriguez's notes from OV 07/17/17.   Kristina will call patient directly with Dr Anguiano's recommendations.

## 2017-07-18 NOTE — TELEPHONE ENCOUNTER
Received a call from Lizeth COSTELLO from [facility] [employer] [facility], pt saw Chrissy FELIZ yesterday was seen for depression, crying all the time. The symptoms started couple of weeks ago, but last week has been worse for the pt. Pt has reported these symptoms since Keppra was started. Pt has an appt in 8/2017.

## 2017-07-24 NOTE — TELEPHONE ENCOUNTER
Message  Received: 2 days ago       Lino Anguiano M.D.  Kristina Gutiérrez, Med Ass't       Caller: Unspecified (6 days ago, 4:03 PM)                     Side effects probably are related to the Keppra. We can change her to a different medication when I see her next month.       Message forwarded to Navneet

## 2017-08-01 ENCOUNTER — HOSPITAL ENCOUNTER (INPATIENT)
Facility: MEDICAL CENTER | Age: 27
LOS: 1 days | DRG: 881 | End: 2017-08-04
Attending: EMERGENCY MEDICINE | Admitting: INTERNAL MEDICINE
Payer: COMMERCIAL

## 2017-08-01 DIAGNOSIS — F32.2 SEVERE SINGLE CURRENT EPISODE OF MAJOR DEPRESSIVE DISORDER, WITHOUT PSYCHOTIC FEATURES (HCC): ICD-10-CM

## 2017-08-01 LAB
AMPHET UR QL SCN: NEGATIVE
BARBITURATES UR QL SCN: NEGATIVE
BENZODIAZ UR QL SCN: NEGATIVE
BZE UR QL SCN: NEGATIVE
CANNABINOIDS UR QL SCN: NEGATIVE
ETHANOL BLD-MCNC: 0 G/DL
HCG UR QL: NEGATIVE
METHADONE UR QL SCN: NEGATIVE
OPIATES UR QL SCN: NEGATIVE
OXYCODONE UR QL SCN: NEGATIVE
PCP UR QL SCN: NEGATIVE
PROPOXYPH UR QL SCN: NEGATIVE
SP GR UR REFRACTOMETRY: 1.01

## 2017-08-01 PROCEDURE — A9270 NON-COVERED ITEM OR SERVICE: HCPCS | Performed by: EMERGENCY MEDICINE

## 2017-08-01 PROCEDURE — 700102 HCHG RX REV CODE 250 W/ 637 OVERRIDE(OP): Performed by: EMERGENCY MEDICINE

## 2017-08-01 PROCEDURE — 90791 PSYCH DIAGNOSTIC EVALUATION: CPT

## 2017-08-01 PROCEDURE — 81025 URINE PREGNANCY TEST: CPT

## 2017-08-01 PROCEDURE — 99285 EMERGENCY DEPT VISIT HI MDM: CPT

## 2017-08-01 PROCEDURE — 80307 DRUG TEST PRSMV CHEM ANLYZR: CPT

## 2017-08-01 RX ORDER — FLUOXETINE 10 MG/1
10 CAPSULE ORAL DAILY
Status: DISCONTINUED | OUTPATIENT
Start: 2017-08-01 | End: 2017-08-04 | Stop reason: HOSPADM

## 2017-08-01 RX ORDER — IBUPROFEN 800 MG/1
400 TABLET ORAL EVERY 6 HOURS PRN
Status: DISCONTINUED | OUTPATIENT
Start: 2017-08-01 | End: 2017-08-04 | Stop reason: HOSPADM

## 2017-08-01 RX ORDER — LEVETIRACETAM 500 MG/1
1000 TABLET, FILM COATED, EXTENDED RELEASE ORAL DAILY
Status: ON HOLD | COMMUNITY
End: 2017-08-04

## 2017-08-01 RX ORDER — LEVETIRACETAM 500 MG/1
500 TABLET ORAL 2 TIMES DAILY
Status: DISCONTINUED | OUTPATIENT
Start: 2017-08-01 | End: 2017-08-02

## 2017-08-01 RX ADMIN — FLUOXETINE 10 MG: 10 CAPSULE ORAL at 18:52

## 2017-08-01 RX ADMIN — LEVETIRACETAM 500 MG: 500 TABLET, FILM COATED ORAL at 20:57

## 2017-08-01 ASSESSMENT — PAIN SCALES - GENERAL: PAINLEVEL_OUTOF10: 4

## 2017-08-01 ASSESSMENT — LIFESTYLE VARIABLES
EVER FELT BAD OR GUILTY ABOUT YOUR DRINKING: NO
DO YOU DRINK ALCOHOL: YES
TOTAL SCORE: 0
HAVE PEOPLE ANNOYED YOU BY CRITICIZING YOUR DRINKING: NO
TOTAL SCORE: 0
TOTAL SCORE: 0
ON A TYPICAL DAY WHEN YOU DRINK ALCOHOL HOW MANY DRINKS DO YOU HAVE: 2
AVERAGE NUMBER OF DAYS PER WEEK YOU HAVE A DRINK CONTAINING ALCOHOL: 2
HAVE YOU EVER FELT YOU SHOULD CUT DOWN ON YOUR DRINKING: NO
CONSUMPTION TOTAL: NEGATIVE
HOW MANY TIMES IN THE PAST YEAR HAVE YOU HAD 5 OR MORE DRINKS IN A DAY: 0
EVER HAD A DRINK FIRST THING IN THE MORNING TO STEADY YOUR NERVES TO GET RID OF A HANGOVER: NO

## 2017-08-01 NOTE — ED NOTES
"Chief Complaint   Patient presents with   • Headache     x 3 days   • Suicidal Ideation       Pt has had a recent switch in seizure medications, states since then has been having thoughts of hurting herself. Pt states she knows these thoughts are from the medications, has never felt this way before.  Pt tried to contact neurologist and PCP about medication change, earliest appointments are in September.     /88 mmHg  Pulse 75  Temp(Src) 36.7 °C (98 °F)  Resp 16  Ht 1.626 m (5' 4\")  Wt 76 kg (167 lb 8.8 oz)  BMI 28.75 kg/m2  SpO2 100%  LMP 07/15/2017      Pt Informed regarding triage process and verbalized understanding to inform triage tech or RN for any changes in condition. Charge RN aware of pt.    "

## 2017-08-01 NOTE — ED PROVIDER NOTES
ED Provider Note    The patient is currently awaiting transfer to psychiatric facility for depression no issues raised during my interaction with patient.
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
## 2017-08-01 NOTE — ED PROVIDER NOTES
ED Provider Note    Scribed for Mario Simmons M.D. by Geoff Razo. 8/1/2017  5:24 AM    Primary care provider: AMADEO Wakefield  Means of arrival: walk in  History obtained from: patient  History limited by: none    CHIEF COMPLAINT  Chief Complaint   Patient presents with   • Headache     x 3 days   • Suicidal Ideation       HPI  Natasha Pino is a 27 y.o. female who presents to the Emergency Department for evaluation of suicidal ideation and complaining of worsening depression. The patient feels that she seems to have gotten more depressed after taking Keppra. She is now having thoughts of suicide. She seems to feel this is related to her medication. She's been having difficulty getting an appointment with her neurologist. However, given her worsening depression and suicidal ideation comes to the emergency department for evaluation.    REVIEW OF SYSTEMS  Pertinent positives include depression and suicidal ideation.   Pertinent negatives include no homicidal ideation.    All other systems reviewed and negative.    C.    PAST MEDICAL HISTORY   has a past medical history of Hypertension; Kidney disease; Localization-related partial epilepsy with complex partial seizures (CMS-HCC) (5/5/2017); Migraine without aura and without status migrainosus, not intractable (5/5/2017); and Major depressive disorder (7/17/2017).    SURGICAL HISTORY   has past surgical history that includes leep (2011) and appendectomy.    SOCIAL HISTORY  Social History   Substance Use Topics   • Smoking status: Never Smoker    • Smokeless tobacco: Never Used   • Alcohol Use: 0.0 oz/week     0 Standard drinks or equivalent per week      Comment: Socially      History   Drug Use No       FAMILY HISTORY  Family History   Problem Relation Age of Onset   • Cancer Mother      ovarian CA  and cervica CA (n her mid 30s)   • Hyperlipidemia Father    • Heart Attack Father      twice   • Seizures Sister    • GI Sister       "\"intestinal problem\"   • Diabetes Maternal Grandmother        CURRENT MEDICATIONS  Home Medications     Reviewed by Karri Pablo R.N. (Registered Nurse) on 08/01/17 at 0639  Med List Status: Partial    Medication Last Dose Status    asa/apap/caffeine (EXCEDRIN) 250-250-65 MG Tab PRN Active    drospirenone-ethinyl estradiol (BP) 3-0.03 MG per tablet Not Taking Active    fluoxetine (PROZAC) 10 MG Cap  Active    hydrochlorothiazide (HYDRODIURIL) 25 MG Tab  Active    hydrochlorothiazide (HYDRODIURIL) 25 MG Tab  Active    ibuprofen (MOTRIN) 200 MG Tab PRN Active    LamoTRIgine 25 & 50 & 100 MG Kit Not taking Active    levetiracetam (KEPPRA XR) 500 MG TABLET SR 24 HR  Active                ALLERGIES  No Known Allergies    PHYSICAL EXAM  VITAL SIGNS: /88 mmHg  Pulse 75  Temp(Src) 36.7 °C (98 °F)  Resp 16  Ht 1.626 m (5' 4\")  Wt 76 kg (167 lb 8.8 oz)  BMI 28.75 kg/m2  SpO2 100%  LMP 07/15/2017    Nursing note and vitals reviewed.  Constitutional: Well-developed and well-nourished. Mild distress,   HENT: Head is normocephalic and atraumatic. Oropharynx is clear and moist without exudate or erythema.   Eyes: Pupils are equal, round, and reactive to light. Conjunctiva are normal.   Cardiovascular: Normal rate and regular rhythm. No murmur heard. Normal radial pulses.  Pulmonary/Chest: Breath sounds normal. No wheezes or rales.   Abdominal: Soft and non-tender. No distention    Musculoskeletal: Extremities exhibit normal range of motion without edema or tenderness.   Neurological: Awake, alert and oriented to person, place, and time. No focal deficits noted.  Skin: Skin is warm and dry. No rash.   Psychiatric: Depressed mood, flat affect, confirm suicidal ideation, no specific plan    DIAGNOSTIC STUDIES / PROCEDURES    LABS  Results for orders placed or performed during the hospital encounter of 08/01/17   URINE DRUG SCREEN   Result Value Ref Range    Amphetamines Urine Negative Negative    Barbiturates " Negative Negative    Benzodiazepines Negative Negative    Cocaine Metabolite Negative Negative    Methadone Negative Negative    Opiates Negative Negative    Oxycodone Negative Negative    Phencyclidine -Pcp Negative Negative    Propoxyphene Negative Negative    Cannabinoid Metab Negative Negative   DIAGNOSTIC ALCOHOL   Result Value Ref Range    Diagnostic Alcohol 0.00 0.00 g/dL   All labs reviewed by me.    RADIOLOGY  No orders to display   The radiologist's interpretation of all radiological studies have been reviewed by me.    COURSE & MEDICAL DECISION MAKING  Nursing notes, VS, PMSFHx reviewed in chart.     Review of past medical records shows the patient was being evaluated by her physician. She reports increased depression secondary to recent switch to taking Keppra. Per notes the patient has been attempting to consult with Dr. Ohara, but has difficulty obtaining an expedient appointment.     5:24 AM - Patient seen and examined at bedside. Ordered Urine drug screen, Diagnostic alcohol to evaluate her symptoms.      The patient presents today with worsening depression and suicidal ideation. Seems that this has been exacerbated by Her up. However at this point she has fairly severe symptoms. The patient has been evaluated by [facility]. We both agree that she would benefit from inpatient psychiatric treatment. The patient is on a legal 2000 and is awaiting transfer to an inpatient psychiatric facility.      FINAL IMPRESSION  1. Severe single current episode of major depressive disorder, without psychotic features (CMS-HCC)    2. Suicidal ideation          Geoff BELTRAN (Jessica), am scribing for, and in the presence of, Mario Simmons M.D..    Electronically signed by: Geoff Razo (Jessica), 8/1/2017    IMario M.D. personally performed the services described in this documentation, as scribed by Geoff Razo in my presence, and it is both accurate and complete.    The note accurately  reflects work and decisions made by me.  Mario Simmons  8/1/2017  10:38 AM     - - -

## 2017-08-01 NOTE — IP AVS SNAPSHOT
8/4/2017    Natasha Pino  [address] [place] [address]    Dear Natasha:    Critical access hospital wants to ensure your discharge home is safe and you or your loved ones have had all of your questions answered regarding your care after you leave the hospital.    Below is a list of resources and contact information should you have any questions regarding your hospital stay, follow-up instructions, or active medical symptoms.    Questions or Concerns Regarding… Contact   Medical Questions Related to Your Discharge  (7 days a week, 8am-5pm) Contact a Nurse Care Coordinator   [phone]   Medical Questions Not Related to Your Discharge  (24 hours a day / 7 days a week)  Contact the Nurse Health Line   [phone]    Medications or Discharge Instructions Refer to your discharge packet   or contact your [facility] Primary Care Provider   [phone]   Follow-up Appointment(s) Schedule your appointment via Bloxr   or contact Scheduling [phone]   Billing Review your statement via Bloxr  or contact Billing [phone]   Medical Records Review your records via Bloxr   or contact Medical Records [phone]     You may receive a telephone call within two days of discharge. This call is to make certain you understand your discharge instructions and have the opportunity to have any questions answered. You can also easily access your medical information, test results and upcoming appointments via the Bloxr free online health management tool. You can learn more and sign up at [email or web address]. For assistance setting up your Bloxr account, please call [phone].    Once again, we want to ensure your discharge home is safe and that you have a clear understanding of any next steps in your care. If you have any questions or concerns, please do not hesitate to contact us, we are here for you. Thank you for choosing [facility] for your healthcare needs.    Sincerely,    Your [facility] Healthcare Team

## 2017-08-01 NOTE — IP AVS SNAPSHOT
" <p align=\"LEFT\"><IMG SRC=\"//[email or web address]$/[email or web address]\" alt=\"Image\" WIDTH=\"50%\" HEIGHT=\"200\" BORDER=\"\"></p>                   Name:Natasha Pino  Medical Record Number:[id number]  CSN: [id number]    YOB: 1990   Age: 27 y.o.  Sex: female  HT:1.626 m (5' 4.02\") WT: 76 kg (167 lb 8.8 oz)          Admit Date: 8/1/2017     Discharge Date:   Today's Date: 8/4/2017  Attending Doctor:  Lluvia Rawls M.D.                  Allergies:  Mushroom extract complex          Your appointments     Aug 08, 2017 10:00 AM   CARE MANAGER TELEPHONE VISIT with CARE MANAGER   [address]  [place] [address]   [phone]           ***IMPORTANT**** This is a phone visit only. Do not come into the office. The Care Manager will call you at the scheduled time for Care Manager Telephone Visit.            Aug 10, 2017 12:40 PM   Established Patient with AMADEO Fenton   [address]  [place] [address]   [phone]           You will be receiving a confirmation call a few days before your appointment from our automated call confirmation system.            Aug 29, 2017  9:00 AM   VIDEO EEG MONITORING with NEURODIAGNOSTIC LAB [facility] (--)    [address], [facility] [address]  [place] [address]   [phone]           Limit caffeine. Clean, dry hair, no gels, oils, or hairsprays. If testing for seizures or spells, please allow no more than 4 hours of sleep the night before the exam (sleep 12am-4am) Pre-authorization is typically required.            Nov 10, 2017  8:00 AM   Follow Up Visit with Lino Anguiano M.D.   [facility] (--)    [address], [facility] [address]  [place] [id number] [phone]           You will be receiving a confirmation call a few days before your appointment from our automated call confirmation system.                 Medication List      Take these " Medications        Instructions    fluoxetine 10 MG Caps   Commonly known as:  PROZAC    Take 1 Cap by mouth every day.   Dose:  10 mg       ibuprofen 200 MG Tabs   Commonly known as:  MOTRIN    Take 400 mg by mouth every 6 hours as needed for Mild Pain.   Dose:  400 mg       lacosamide 50 MG Tabs tablet   Commonly known as:  VIMPAT    Take 1 Tab by mouth 2 Times a Day.   Dose:  50 mg

## 2017-08-01 NOTE — ED NOTES
Pt ambulated to G23, agree with triage note.  Pt changed into a hospital gown.  All belongings removed and placed in 2 appropriately labeled bags and placed in ambulance bay.  All dangerous items removed from room.

## 2017-08-01 NOTE — IP AVS SNAPSHOT
" Home Care Instructions                                                                                                                  Name:Natasha Pino  Medical Record Number:[id number]  CSN: [id number]    YOB: 1990   Age: 27 y.o.  Sex: female  HT:1.626 m (5' 4.02\") WT: 76 kg (167 lb 8.8 oz)          Admit Date: 8/1/2017     Discharge Date:   Today's Date: 8/4/2017  Attending Doctor:  Lluvia Rawls M.D.                  Allergies:  Mushroom extract complex            Discharge Instructions        Discharge patient Home   Diet regular with 9-13 servings of fruits and vegetables   Activities as tolerated   Follow ups with PCP in 7-10 days, call for appointment   Neurology appointment to be scheduled in the next 7-10 days.   Meds per med rec sheet   No smoking, no alcohol, no caffeine   Wear seat belt in motorized vehicle   Take medications as perscribed   Keep appointments   If symptoms worsen call PCP, 911 or urgent care.    Discharge Instructions    Discharged to home by car with relative. Discharged via wheelchair, hospital escort: Yes.  Special equipment needed: Not Applicable    Be sure to schedule a follow-up appointment with your primary care doctor or any specialists as instructed.     Discharge Plan:   Influenza Vaccine Indication: Indicated: Not available from distributor/    I understand that a diet low in cholesterol, fat, and sodium is recommended for good health. Unless I have been given specific instructions below for another diet, I accept this instruction as my diet prescription.   Other diet: regular    Special Instructions: None    · Is patient discharged on Warfarin / Coumadin?   No     · Is patient Post Blood Transfusion?  No    Depression / Suicide Risk    As you are discharged from this [facility] facility, it is important to learn how to keep safe from harming yourself.    Recognize the warning signs:  · Abrupt changes in personality, positive or negative- " including increase in energy   · Giving away possessions  · Change in eating patterns- significant weight changes-  positive or negative  · Change in sleeping patterns- unable to sleep or sleeping all the time   · Unwillingness or inability to communicate  · Depression  · Unusual sadness, discouragement and loneliness  · Talk of wanting to die  · Neglect of personal appearance   · Rebelliousness- reckless behavior  · Withdrawal from people/activities they love  · Confusion- inability to concentrate     If you or a loved one observes any of these behaviors or has concerns about self-harm, here's what you can do:  · Talk about it- your feelings and reasons for harming yourself  · Remove any means that you might use to hurt yourself (examples: pills, rope, extension cords, firearm)  · Get professional help from the community (Mental Health, Substance Abuse, psychological counseling)  · Do not be alone:Call your Safe Contact- someone whom you trust who will be there for you.  · Call your local CRISIS HOTLINE 424-5979 or 651-641-4522  · Call your local Children's Mobile Crisis Response Team [place] (733) 223-3852 or wwwBridge [place]  · Call the toll free National Suicide Prevention Hotlines   · National Suicide Prevention Lifeline 387-802-LNCT (9661)  · National Hope Line Network 800-SUICIDE (549-9398)          Your appointments     Aug 08, 2017 10:00 AM   CARE MANAGER TELEPHONE VISIT with CARE MANAGER   [address]  [facility] [address] [phone]           ***IMPORTANT**** This is a phone visit only. Do not come into the office. The Care Manager will call you at the scheduled time for Care Manager Telephone Visit.            Aug 10, 2017 12:40 PM   Established Patient with AMADEO Fenton   [facility]    [address] [place] [address] [address]  [place] [address] [phone]           You will be receiving a confirmation call a few days  before your appointment from our automated call confirmation system.            Aug 29, 2017  9:00 AM   VIDEO EEG MONITORING with NEURODIAGNOSTIC LAB [facility] (--)    [address], [address]  [facility] [address]   [phone]           Limit caffeine. Clean, dry hair, no gels, oils, or hairsprays. If testing for seizures or spells, please allow no more than 4 hours of sleep the night before the exam (sleep 12am-4am) Pre-authorization is typically required.            Nov 10, 2017  8:00 AM   Follow Up Visit with Lino Anguiano M.D.   [facility] (--)    [address], [address]  [facility] [address]   [phone]           You will be receiving a confirmation call a few days before your appointment from our automated call confirmation system.                 Discharge Medication Instructions:    Below are the medications your physician expects you to take upon discharge:    Review all your home medications and newly ordered medications with your doctor and/or pharmacist. Follow medication instructions as directed by your doctor and/or pharmacist.    Please keep your medication list with you and share with your physician.               Medication List      START taking these medications        Instructions    Morning Afternoon Evening Bedtime    lacosamide 50 MG Tabs tablet   Commonly known as:  VIMPAT   Next Dose Due:  8/4/17        Take 1 Tab by mouth 2 Times a Day.   Dose:  50 mg                          CONTINUE taking these medications        Instructions    Morning Afternoon Evening Bedtime    fluoxetine 10 MG Caps   Last time this was given:  10 mg on 8/4/2017  8:10 AM   Commonly known as:  PROZAC   Next Dose Due:  8/5/17        Take 1 Cap by mouth every day.   Dose:  10 mg                        ibuprofen 200 MG Tabs   Last time this was given:  400 mg on 8/4/2017  8:13 AM   Commonly known as:  MOTRIN   Next Dose Due:  8/4/17          Take 400 mg by mouth  every 6 hours as needed for Mild Pain.   Dose:  400 mg                          STOP taking these medications     KEPPRA  MG Tb24   Generic drug:  levetiracetam                    Where to Get Your Medications      You can get these medications from any pharmacy     Bring a paper prescription for each of these medications    - lacosamide 50 MG Tabs tablet            Instructions           Diet / Nutrition:    Follow any diet instructions given to you by your doctor or the dietician, including how much salt (sodium) you are allowed each day.    If you are overweight, talk to your doctor about a weight reduction plan.    Activity:    Remain physically active following your doctor's instructions about exercise and activity.    Rest often.     Any time you become even a little tired or short of breath, SIT DOWN and rest.    Worsening Symptoms:    Report any of the following signs and symptoms to the doctor's office immediately:    *Pain of jaw, arm, or neck  *Chest pain not relieved by medication                               *Dizziness or loss of consciousness  *Difficulty breathing even when at rest   *More tired than usual                                       *Bleeding drainage or swelling of surgical site  *Swelling of feet, ankles, legs or stomach                 *Fever (>100ºF)  *Pink or blood tinged sputum  *Weight gain (3lbs/day or 5lbs /week)           *Shock from internal defibrillator (if applicable)  *Palpitations or irregular heartbeats                *Cool and/or numb extremities    Stroke Awareness    Common Risk Factors for Stroke include:    Age  Atrial Fibrillation  Carotid Artery Stenosis  Diabetes Mellitus  Excessive alcohol consumption  High blood pressure  Overweight   Physical inactivity  Smoking    Warning signs and symptoms of a stroke include:    *Sudden numbness or weakness of the face, arm or leg (especially on one side of the body).  *Sudden confusion, trouble speaking or  understanding.  *Sudden trouble seeing in one or both eyes.  *Sudden trouble walking, dizziness, loss of balance or coordination.Sudden severe headache with no known cause.    It is very important to get treatment quickly when a stroke occurs. If you experience any of the above warning signs, call 911 immediately.                   Disclaimer         Quit Smoking / Tobacco Use:    I understand the use of any tobacco products increases my chance of suffering from future heart disease or stroke and could cause other illnesses which may shorten my life. Quitting the use of tobacco products is the single most important thing I can do to improve my health. For further information on smoking / tobacco cessation call a Toll Free Quit Line at 1-192.489.4592 (*National Cancer [place]) or 1-438.939.4577 (American Lung Association) or you can access the web based program at www.lungusa.org.    [place] Tobacco Users Help Line:  (136) 169-4312       Toll Free: 1-150.269.4938  Quit Tobacco Program [facility] (353)892-6960    Crisis Hotline:    [place] Crisis Hotline:  2-541-SGOBLSD or 1-605.668.4240    [place] Crisis Hotline:    1-134.122.5817 or 899-173-6293    Discharge Survey:   Thank you for choosing [facility]. We hope we did everything we could to make your hospital stay a pleasant one. You may be receiving a phone survey and we would appreciate your time and participation in answering the questions. Your input is very valuable to us in our efforts to improve our service to our patients and their families.        My signature on this form indicates that:    1. I have reviewed and understand the above information.  2. My questions regarding this information have been answered to my satisfaction.  3. I have formulated a plan with my discharge nurse to obtain my prescribed medications for home.                  Disclaimer         __________________________________                     __________        ________                       Patient Signature                                                 Date                    Time

## 2017-08-01 NOTE — DISCHARGE PLANNING
Medical Social Work    Referral: Legal Hold    Intervention: Legal Hold Paperwork given to SW by [employer] RN: Anisha    Legal Hold Initiated: Date: 08/01/2017  Time: 0719    Legal Hold faxed: Date: 08/01/2017  Time: 1545    Patient’s Insurance Listed on Face Sheet: [facility]     [facility]     Plan: Patient will transfer to mental health facility once acceptance is obtained.     Confirmation of receipt of referral by phone with: fax confirmation

## 2017-08-01 NOTE — IP AVS SNAPSHOT
[employer] Access Code: Activation code not generated  Current [employer] Status: Active    Healthy Labshart  A secure, online tool to manage your health information     [employer]’s [employer]® is a secure, online tool that connects you to your personalized health information from the privacy of your home -- day or night - making it very easy for you to manage your healthcare. Once the activation process is completed, you can even access your medical information using the [employer] sonny, which is available for free in the Apple Sonny store or Google Play store.     [employer] provides the following levels of access (as shown below):   My Chart Features   [facility] Primary Care Doctor [facility]  Specialists [facility]  Urgent  Care Non-[facility]  Primary Care  Doctor   Email your healthcare team securely and privately 24/7 X X X X   Manage appointments: schedule your next appointment; view details of past/upcoming appointments X      Request prescription refills. X      View recent personal medical records, including lab and immunizations X X X X   View health record, including health history, allergies, medications X X X X   Read reports about your outpatient visits, procedures, consult and ER notes X X X X   See your discharge summary, which is a recap of your hospital and/or ER visit that includes your diagnosis, lab results, and care plan. X X       How to register for [employer]:  1. Go to  [email or web address].  2. Click on the Sign Up Now box, which takes you to the New Member Sign Up page. You will need to provide the following information:  a. Enter your [employer] Access Code exactly as it appears at the top of this page. (You will not need to use this code after you’ve completed the sign-up process. If you do not sign up before the expiration date, you must request a new code.)   b. Enter your date of birth.   c. Enter your home email address.   d. Click Submit, and follow the next screen’s instructions.  3. Create a [employer] ID. This will  be your [employer] login ID and cannot be changed, so think of one that is secure and easy to remember.  4. Create a [employer] password. You can change your password at any time.  5. Enter your Password Reset Question and Answer. This can be used at a later time if you forget your password.   6. Enter your e-mail address. This allows you to receive e-mail notifications when new information is available in [employer].  7. Click Sign Up. You can now view your health information.    For assistance activating your [employer] account, call [phone]

## 2017-08-01 NOTE — ED NOTES
"Med rec updated and complete  Allergies reviewed  Pt states \"No antibiotics in the last 30 days\".  Pt states \"No vitamins\".     "

## 2017-08-02 ENCOUNTER — RESOLUTE PROFESSIONAL BILLING HOSPITAL PROF FEE (OUTPATIENT)
Dept: HOSPITALIST | Facility: MEDICAL CENTER | Age: 27
End: 2017-08-02
Payer: COMMERCIAL

## 2017-08-02 PROBLEM — F32.A DEPRESSION: Status: ACTIVE | Noted: 2017-08-02

## 2017-08-02 PROBLEM — R56.9 SEIZURE (HCC): Status: ACTIVE | Noted: 2017-08-02

## 2017-08-02 LAB
ALBUMIN SERPL BCP-MCNC: 4.2 G/DL (ref 3.2–4.9)
ALBUMIN/GLOB SERPL: 1.6 G/DL
ALP SERPL-CCNC: 40 U/L (ref 30–99)
ALT SERPL-CCNC: 19 U/L (ref 2–50)
ANION GAP SERPL CALC-SCNC: 10 MMOL/L (ref 0–11.9)
AST SERPL-CCNC: 14 U/L (ref 12–45)
BASOPHILS # BLD AUTO: 0.7 % (ref 0–1.8)
BASOPHILS # BLD: 0.05 K/UL (ref 0–0.12)
BILIRUB SERPL-MCNC: 0.7 MG/DL (ref 0.1–1.5)
BUN SERPL-MCNC: 9 MG/DL (ref 8–22)
CALCIUM SERPL-MCNC: 9.3 MG/DL (ref 8.5–10.5)
CHLORIDE SERPL-SCNC: 107 MMOL/L (ref 96–112)
CO2 SERPL-SCNC: 22 MMOL/L (ref 20–33)
CREAT SERPL-MCNC: 0.66 MG/DL (ref 0.5–1.4)
EOSINOPHIL # BLD AUTO: 0.08 K/UL (ref 0–0.51)
EOSINOPHIL NFR BLD: 1.1 % (ref 0–6.9)
ERYTHROCYTE [DISTWIDTH] IN BLOOD BY AUTOMATED COUNT: 39.1 FL (ref 35.9–50)
GFR SERPL CREATININE-BSD FRML MDRD: >60 ML/MIN/1.73 M 2
GLOBULIN SER CALC-MCNC: 2.6 G/DL (ref 1.9–3.5)
GLUCOSE SERPL-MCNC: 69 MG/DL (ref 65–99)
HCT VFR BLD AUTO: 43.6 % (ref 37–47)
HGB BLD-MCNC: 15.1 G/DL (ref 12–16)
IMM GRANULOCYTES # BLD AUTO: 0.02 K/UL (ref 0–0.11)
IMM GRANULOCYTES NFR BLD AUTO: 0.3 % (ref 0–0.9)
LYMPHOCYTES # BLD AUTO: 1.79 K/UL (ref 1–4.8)
LYMPHOCYTES NFR BLD: 24.1 % (ref 22–41)
MCH RBC QN AUTO: 30.6 PG (ref 27–33)
MCHC RBC AUTO-ENTMCNC: 34.6 G/DL (ref 33.6–35)
MCV RBC AUTO: 88.4 FL (ref 81.4–97.8)
MONOCYTES # BLD AUTO: 0.69 K/UL (ref 0–0.85)
MONOCYTES NFR BLD AUTO: 9.3 % (ref 0–13.4)
NEUTROPHILS # BLD AUTO: 4.81 K/UL (ref 2–7.15)
NEUTROPHILS NFR BLD: 64.5 % (ref 44–72)
NRBC # BLD AUTO: 0 K/UL
NRBC BLD AUTO-RTO: 0 /100 WBC
PLATELET # BLD AUTO: 340 K/UL (ref 164–446)
PMV BLD AUTO: 9.8 FL (ref 9–12.9)
POTASSIUM SERPL-SCNC: 3.5 MMOL/L (ref 3.6–5.5)
PROT SERPL-MCNC: 6.8 G/DL (ref 6–8.2)
RBC # BLD AUTO: 4.93 M/UL (ref 4.2–5.4)
SODIUM SERPL-SCNC: 139 MMOL/L (ref 135–145)
WBC # BLD AUTO: 7.4 K/UL (ref 4.8–10.8)

## 2017-08-02 PROCEDURE — A9270 NON-COVERED ITEM OR SERVICE: HCPCS | Performed by: EMERGENCY MEDICINE

## 2017-08-02 PROCEDURE — 700102 HCHG RX REV CODE 250 W/ 637 OVERRIDE(OP): Performed by: EMERGENCY MEDICINE

## 2017-08-02 PROCEDURE — 95951 EEG: CPT

## 2017-08-02 PROCEDURE — 99220 PR INITIAL OBSERVATION CARE,LEVL III: CPT | Performed by: INTERNAL MEDICINE

## 2017-08-02 PROCEDURE — 85025 COMPLETE CBC W/AUTO DIFF WBC: CPT

## 2017-08-02 PROCEDURE — G0378 HOSPITAL OBSERVATION PER HR: HCPCS

## 2017-08-02 PROCEDURE — 99285 EMERGENCY DEPT VISIT HI MDM: CPT

## 2017-08-02 PROCEDURE — 80053 COMPREHEN METABOLIC PANEL: CPT

## 2017-08-02 PROCEDURE — 4A00X4Z MEASUREMENT OF CENTRAL NERVOUS ELECTRICAL ACTIVITY, EXTERNAL APPROACH: ICD-10-PCS | Performed by: PSYCHIATRY & NEUROLOGY

## 2017-08-02 RX ORDER — LORAZEPAM 2 MG/ML
1 INJECTION INTRAMUSCULAR EVERY 4 HOURS PRN
Status: DISCONTINUED | OUTPATIENT
Start: 2017-08-02 | End: 2017-08-04 | Stop reason: HOSPADM

## 2017-08-02 RX ORDER — HEPARIN SODIUM 5000 [USP'U]/ML
5000 INJECTION, SOLUTION INTRAVENOUS; SUBCUTANEOUS EVERY 8 HOURS
Status: DISCONTINUED | OUTPATIENT
Start: 2017-08-02 | End: 2017-08-04 | Stop reason: HOSPADM

## 2017-08-02 RX ORDER — LORAZEPAM 2 MG/ML
2 INJECTION INTRAMUSCULAR
Status: DISCONTINUED | OUTPATIENT
Start: 2017-08-02 | End: 2017-08-04 | Stop reason: HOSPADM

## 2017-08-02 RX ORDER — BISACODYL 10 MG
10 SUPPOSITORY, RECTAL RECTAL
Status: DISCONTINUED | OUTPATIENT
Start: 2017-08-02 | End: 2017-08-04 | Stop reason: HOSPADM

## 2017-08-02 RX ORDER — POLYETHYLENE GLYCOL 3350 17 G/17G
1 POWDER, FOR SOLUTION ORAL
Status: DISCONTINUED | OUTPATIENT
Start: 2017-08-02 | End: 2017-08-04 | Stop reason: HOSPADM

## 2017-08-02 RX ORDER — AMOXICILLIN 250 MG
2 CAPSULE ORAL 2 TIMES DAILY
Status: DISCONTINUED | OUTPATIENT
Start: 2017-08-02 | End: 2017-08-04 | Stop reason: HOSPADM

## 2017-08-02 RX ADMIN — FLUOXETINE 10 MG: 10 CAPSULE ORAL at 10:51

## 2017-08-02 ASSESSMENT — LIFESTYLE VARIABLES
HOW MANY TIMES IN THE PAST YEAR HAVE YOU HAD 5 OR MORE DRINKS IN A DAY: 2
CONSUMPTION TOTAL: POSITIVE
HAVE YOU EVER FELT YOU SHOULD CUT DOWN ON YOUR DRINKING: NO
EVER FELT BAD OR GUILTY ABOUT YOUR DRINKING: NO
ON A TYPICAL DAY WHEN YOU DRINK ALCOHOL HOW MANY DRINKS DO YOU HAVE: 1
EVER_SMOKED: NEVER
TOTAL SCORE: 0
HAVE PEOPLE ANNOYED YOU BY CRITICIZING YOUR DRINKING: NO
DO YOU DRINK ALCOHOL: YES
EVER HAD A DRINK FIRST THING IN THE MORNING TO STEADY YOUR NERVES TO GET RID OF A HANGOVER: NO
TOTAL SCORE: 0
TOTAL SCORE: 0
AVERAGE NUMBER OF DAYS PER WEEK YOU HAVE A DRINK CONTAINING ALCOHOL: 0

## 2017-08-02 ASSESSMENT — PATIENT HEALTH QUESTIONNAIRE - PHQ9
9. THOUGHTS THAT YOU WOULD BE BETTER OFF DEAD, OR OF HURTING YOURSELF: SEVERAL DAYS
7. TROUBLE CONCENTRATING ON THINGS, SUCH AS READING THE NEWSPAPER OR WATCHING TELEVISION: SEVERAL DAYS
6. FEELING BAD ABOUT YOURSELF - OR THAT YOU ARE A FAILURE OR HAVE LET YOURSELF OR YOUR FAMILY DOWN: MORE THAN HALF THE DAYS
4. FEELING TIRED OR HAVING LITTLE ENERGY: MORE THAN HALF THE DAYS
1. LITTLE INTEREST OR PLEASURE IN DOING THINGS: SEVERAL DAYS
5. POOR APPETITE OR OVEREATING: MORE THAN HALF THE DAYS
3. TROUBLE FALLING OR STAYING ASLEEP OR SLEEPING TOO MUCH: SEVERAL DAYS
8. MOVING OR SPEAKING SO SLOWLY THAT OTHER PEOPLE COULD HAVE NOTICED. OR THE OPPOSITE, BEING SO FIGETY OR RESTLESS THAT YOU HAVE BEEN MOVING AROUND A LOT MORE THAN USUAL: SEVERAL DAYS
4. FEELING TIRED OR HAVING LITTLE ENERGY: MORE THAN HALF THE DAYS
7. TROUBLE CONCENTRATING ON THINGS, SUCH AS READING THE NEWSPAPER OR WATCHING TELEVISION: SEVERAL DAYS
3. TROUBLE FALLING OR STAYING ASLEEP OR SLEEPING TOO MUCH: SEVERAL DAYS
SUM OF ALL RESPONSES TO PHQ9 QUESTIONS 1 AND 2: 4
SUM OF ALL RESPONSES TO PHQ QUESTIONS 1-9: 14
2. FEELING DOWN, DEPRESSED, IRRITABLE, OR HOPELESS: NEARLY EVERY DAY
SUM OF ALL RESPONSES TO PHQ9 QUESTIONS 1 AND 2: 4
1. LITTLE INTEREST OR PLEASURE IN DOING THINGS: SEVERAL DAYS
9. THOUGHTS THAT YOU WOULD BE BETTER OFF DEAD, OR OF HURTING YOURSELF: SEVERAL DAYS
5. POOR APPETITE OR OVEREATING: MORE THAN HALF THE DAYS
6. FEELING BAD ABOUT YOURSELF - OR THAT YOU ARE A FAILURE OR HAVE LET YOURSELF OR YOUR FAMILY DOWN: MORE THAN HALF THE DAYS
SUM OF ALL RESPONSES TO PHQ QUESTIONS 1-9: 14
2. FEELING DOWN, DEPRESSED, IRRITABLE, OR HOPELESS: NEARLY EVERY DAY
8. MOVING OR SPEAKING SO SLOWLY THAT OTHER PEOPLE COULD HAVE NOTICED. OR THE OPPOSITE, BEING SO FIGETY OR RESTLESS THAT YOU HAVE BEEN MOVING AROUND A LOT MORE THAN USUAL: SEVERAL DAYS

## 2017-08-02 ASSESSMENT — COGNITIVE AND FUNCTIONAL STATUS - GENERAL
MOBILITY SCORE: 24
SUGGESTED CMS G CODE MODIFIER MOBILITY: CH
SUGGESTED CMS G CODE MODIFIER DAILY ACTIVITY: CH
DAILY ACTIVITIY SCORE: 24

## 2017-08-02 ASSESSMENT — PAIN SCALES - GENERAL
PAINLEVEL_OUTOF10: 0
PAINLEVEL_OUTOF10: 0

## 2017-08-02 NOTE — PROGRESS NOTES
Patient's home medications have been reviewed by the pharmacy team.     Patient's Medications   New Prescriptions    No medications on file   Previous Medications    FLUOXETINE (PROZAC) 10 MG CAP    Take 1 Cap by mouth every day.    IBUPROFEN (MOTRIN) 200 MG TAB    Take 400 mg by mouth every 6 hours as needed for Mild Pain.    LEVETIRACETAM (KEPPRA XR) 500 MG TABLET SR 24 HR    Take 1,000 mg by mouth every day.   Modified Medications    No medications on file   Discontinued Medications    ASA/APAP/CAFFEINE (EXCEDRIN) 250-250-65 MG TAB    Take 1 Tab by mouth every 6 hours as needed for Headache.    DROSPIRENONE-ETHINYL ESTRADIOL (PB) 3-0.03 MG PER TABLET    TAKE 1 TABLET BY MOUTH DAILY    HYDROCHLOROTHIAZIDE (HYDRODIURIL) 25 MG TAB    Take 1 Tab by mouth every day.    HYDROCHLOROTHIAZIDE (HYDRODIURIL) 25 MG TAB    Take 1 Tab by mouth every day.    LAMOTRIGINE 25 & 50 & 100 MG KIT    Take 1 Tab by mouth every day. Take one tab daily, increasing dose, as directed in titration suman.    LEVETIRACETAM (KEPPRA XR) 500 MG TABLET SR 24 HR    1 tab qhs for 4 days, then 2 tab qhs         A:  The following pharmacotherapy concerns may be contributing to current complaints:  Started Keppra ~2 months ago. Seizures not controlled on previous medication.   Pt states depression started ~1.5 months ago. Started Prozac ~3 weeks ago.  No more seizures after starting Keppra.   It is possible for Keppra to cause depression - this would be rare adverse effect. The timing/onset in this patient would be consistent with Keppra induced depression.    P:    Recommend restarting home medications for now. Strongly consider alternative therapy. Will defer to neurology/psychiatry - seizures have been controlled.    Ata Walker, PharmD, BCPS

## 2017-08-02 NOTE — EEG PROGRESS NOTE
EEG 08/02/2017 4:31 PM      So far, Video EEG-- not remarkable, no events, no epileptiform discharges

## 2017-08-02 NOTE — ED NOTES
Pt cont to sleep in bed. NAD noted. Respirations even, equal and unlabored.    Remains under close obs.

## 2017-08-02 NOTE — ED PROVIDER NOTES
ED Provider Note    Patient has been evaluated by Dr. Alfred the psychiatrist. She has taken the patient off of the legal hold. She states that the patient is not suicidal and not homicidal and not dangerous to herself or others. She feels like that any depressive symptoms are related to the Keppra. The patient is safe for discharge. And stable for discharge. Dr. Trammell requests that we arrange neurology follow-up. I have placed a call to Dr. Lino Anguiano neurologist.    I discussed the case with the neurologist ON call for Dr. Courtney and he suggested that we call Marisela Figueroa the  at their office and arrange for the patient be seen this week or next. The nurse and  will make arrangements.    Diagnoses #1 evaluation for depression #2 is not suicidal and not homicidal and not dangerous to self or others. #3. Seizure disorder    Gary GANSERT M.D. August 2, 2017 at 10:30 AM    At 12:30 PM I discussed case with Dr. Lino Courtney and he's evaluated the patient and would like the patient admitted under the care of the hospitalist. He will need to get her Keppra and seizure medication changed congested. Of call the hospitalist patient stable on admission after 12:30 PM.    Diagnoses #1 seizure disorder #2. Depression possibly related to the Keppra    Gary GANSERT M.D. 12:30 PM on August 2, 2017.

## 2017-08-02 NOTE — NON-PROVIDER
"PSYCHIATRIC CONSULTATION:  Reason for admission: worsening depression and suicidal Ideation x 5 days that she attributes to the keppra.    Reason for consult: depression, SI  Requesting Physician: Mario Simmons MD    Legal status:  Legal hold +     Chief Complaint: \"I've been having issues\"     HPI: 27 year old  female who presents to the ED after increasing depression and thoughts of hurting herself. The patient has a history of a seizure disorder that is followed by Dr. Anguiano. She was on Lamictal for about 10 years, but in May of this year she had a breakthrough seizure while driving so she was switched to Keppra 3 months ago. She states that she was doing fine with this switch until about 1.5 months ago when she had a gradual onset of depression like symptoms. Specifically she was experiencing difficulty sleeping, lack of interest in her regular activities, loss of appetite, feelings of hopelessness, low energy, and decreased concentration. 3 weeks ago, she began to have thoughts of hurting herself for the first time in her life and her PCP started her on Prozac 10mg because she did not feel comfortable changing the keppra. . She was doing well for the first 2 weeks, but over the past week she has had increasing symptoms of depression and overwhelming thoughts of suicide and so she presented to the ED for further evaluation. Her F/U with Dr Courtney is in October.    Psychiatric Review of Systems:current symptoms as reported by pt.  Depression: She reports difficulty sleeping, lack of interest in her regular activities, loss of appetite, feelings of hopelessness, low energy, and decreased concentration for one week now.   Swetha:denies     Anxiety/Panic Attacks Reports some anxiety and 1-2 panic attacks in her life. Reports that these are not intrusive and she is usually able to calm herself down with her own coping mechanisms.   PTSD symptom: Denies  Psychosis: Denies  OCD: The patient reports multiple " "symptoms of OCD. She reports recurrent intrusive thoughts and compulsions in which she has to tap or count to get rid of these intrusive thoughts. These compulsions do not go on for more than 15 minutes. She also reports a routine in which she leaves her house in the morning and she must check the locks and switches to ensure everything is turned off. She will do 2-4 cycles in the morning and has been late to work 1-2x because of this cycle.      Medical Review of Systems: as reported by pt. All systems reviewed. Only those found to be + are noted below. All others are negative.   Neurological:    TBIs:denies      SZs:Reports history of complex partial seizures diagnosed 10 years ago. Followed by Dr. Anguiano.    Strokes:denies     Other medical symptoms:   Thyroid:denies      Diabetes:denies      Cardiovascular disease:denies       Psychiatric Examination:  Vitals: Blood pressure 122/72, pulse 80, temperature 36.9 °C (98.4 °F), temperature source Temporal, resp. rate 16, height 1.626 m (5' 4\"), weight 76 kg (167 lb 8.8 oz), last menstrual period 07/15/2017, SpO2 95 %.  Musculoskeletal: Normal psychomotor activity, no tics or unusual mannerisms noted  Appearance: WDWN, appropriate dress and grooming. Behavior is calm, cooperative,  appropriate eye contact  Thoughts:  Linear, logical, goal oriented, no blocking of thought noted, no delusional content noted, not obviously responding to internal stimuli.  Speech: Normal rate and alissa, no pressuring of speech noted  Mood: \"Okay\"           Affect: Mood congruent, normal range of affect          SI/HI: Denies, no evidence of active SI/HI noted   Attention/Alertness: Alert  Memory: Recent and remote memory intact     Orientation: x 4  Fund of Knowledge: adequate    Insight/Judgement into symptoms: Good insight, the patient recognizes her symptoms and the need for treatment. Good judgement, the patient is able to make good decisions.     Past Psychiatric Hx:   The patient " reports that she developed post partum depression 4 years ago after the birth of her son. The symptoms were treated at week 4 and they were not as extreme as what she is experiencing now. She was treated with a B12 shot and an unknown antihistamine and the symptoms resolved within 2 weeks.     She has never seen a psychiatrist, never been admitted to a mental health hospital, and never had a prior suicide attempt/ideations, denies hx of depression, psychosis, manias.     Family Psychiatric Hx:   mother is an alcoholic and struggles with depression. Her mother has had multiple suicide attempts.     Social Hx:  The patient currently lives in an apartment with her Fiance and their 4 year old son. 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 She is very close to her siblings and her father. 3 years college.    Drug/Alcohol/Tobacco Hx:  Drugs: Denies past or current drug use  Alcohol: Drinks 1 glass of wine <1x a month. Last drink was >2 months ago.   Tobacco: Never a smoker.     Medical Hx: labs, MARS, medications, etc were reviewed. Only those findings of potential interest to psychiatry are noted below  Medical Conditions: borderline HTN, Temporal lobe epilepsy, migraine without auras.   Allergies: Review of patient's allergies indicates no known allergies.  Medications (currently prescribed at [facility]):    Current facility-administered medications:   •  fluoxetine (PROZAC) capsule 10 mg, 10 mg, Oral, DAILY, Christy Velez M.D., 10 mg at 08/02/17 1051  •  ibuprofen (MOTRIN) tablet 400 mg, 400 mg, Oral, Q6HRS PRN, Christy Velez M.D.  •  levetiracetam (KEPPRA) tablet 500 mg, 500 mg, Oral, BID, Christy Velez M.D., 500 mg at 08/01/17 2057    Current outpatient prescriptions:   •  levetiracetam (KEPPRA XR) 500 MG TABLET SR 24 HR, Take 1,000 mg by mouth every day., Disp: , Rfl:   •  fluoxetine (PROZAC) 10 MG Cap, Take 1 Cap by mouth every day., Disp: 30 Cap, Rfl: 3  •  ibuprofen (MOTRIN) 200 MG Tab,  "Take 400 mg by mouth every 6 hours as needed for Mild Pain., Disp: , Rfl:    Labs: UDS/alcohol negative. Other: unremarkable.    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 Though this patient has vegetative sings, they only last a week or so. The presentation is very odd for a primary psychiatric disorder.    Obsessive compulsive disorder : pt manges it reasonably well. Not interested in treating this.     PLAN:  The ED has contacted the on-call neurologist who agrees to see the patient in the office in the next two weeks. She will leave with an appointment scheduled. She will increase the prozac in the meanwhile to 20 mg and stay with her sister when her BF is not at home as she starts worrying about \"what if...\" referring to trying to kill herself when she doesn't want to. There is an obsessive and intrusive like quality to the SI a well. She is to return if she feels out of control.    Legal status: D/c legal hold. No scripts.    Signing off   Thank you for the consult.  "

## 2017-08-02 NOTE — DISCHARGE PLANNING
Medical Social Work    MSW received order from Dr. Hutton stated that legal hold was discontinued. MSW called and left message with Unit FADY Hernandez to follow-up with pt and make sure pt's hold was discontinued on the floor or if pt was continued to make sure extension is filed. Will remain available for support.

## 2017-08-02 NOTE — ED NOTES
VS rechecked and stable. Pt resting in hospital bed. NAD noted. Respirations even, equal and unlabored.    Remains under close obs.

## 2017-08-02 NOTE — PROGRESS NOTES
Pt arrived from ER with EEG. Pt alert and oriented x4, no complaints of pain.  Report received from Summer WEBB. patient states she isn't having any suicidal thoughts.  Oriented pt to room, policies of the unit, and call bell system.  Discussed he plan of care for the day.  Pt verbalized understanding of all above.

## 2017-08-02 NOTE — ED NOTES
Pt cont to sleep in hospital bed. NAD noted. Respirations even, equal and unlabored.    Remains under close obs.

## 2017-08-02 NOTE — ED NOTES
Assumed care of pt, report received from Olman WEBB. Pt quietly asleep on [facility]. Respirations visible.   Remains under close obs.

## 2017-08-02 NOTE — ED NOTES
Pt remains asleep. NAD noted. Respirations even, equal and unlabored.    Remains under close obs.

## 2017-08-03 LAB
ALBUMIN SERPL BCP-MCNC: 3.8 G/DL (ref 3.2–4.9)
ALBUMIN/GLOB SERPL: 1.5 G/DL
ALP SERPL-CCNC: 45 U/L (ref 30–99)
ALT SERPL-CCNC: 18 U/L (ref 2–50)
ANION GAP SERPL CALC-SCNC: 8 MMOL/L (ref 0–11.9)
AST SERPL-CCNC: 13 U/L (ref 12–45)
BILIRUB SERPL-MCNC: 0.5 MG/DL (ref 0.1–1.5)
BUN SERPL-MCNC: 12 MG/DL (ref 8–22)
CALCIUM SERPL-MCNC: 8.9 MG/DL (ref 8.5–10.5)
CHLORIDE SERPL-SCNC: 108 MMOL/L (ref 96–112)
CO2 SERPL-SCNC: 23 MMOL/L (ref 20–33)
CREAT SERPL-MCNC: 0.83 MG/DL (ref 0.5–1.4)
ERYTHROCYTE [DISTWIDTH] IN BLOOD BY AUTOMATED COUNT: 38.1 FL (ref 35.9–50)
GFR SERPL CREATININE-BSD FRML MDRD: >60 ML/MIN/1.73 M 2
GLOBULIN SER CALC-MCNC: 2.6 G/DL (ref 1.9–3.5)
GLUCOSE SERPL-MCNC: 119 MG/DL (ref 65–99)
HCT VFR BLD AUTO: 43.5 % (ref 37–47)
HGB BLD-MCNC: 15 G/DL (ref 12–16)
MCH RBC QN AUTO: 30.7 PG (ref 27–33)
MCHC RBC AUTO-ENTMCNC: 34.5 G/DL (ref 33.6–35)
MCV RBC AUTO: 89.1 FL (ref 81.4–97.8)
PLATELET # BLD AUTO: 326 K/UL (ref 164–446)
PMV BLD AUTO: 9.8 FL (ref 9–12.9)
POTASSIUM SERPL-SCNC: 3.8 MMOL/L (ref 3.6–5.5)
PROT SERPL-MCNC: 6.4 G/DL (ref 6–8.2)
RBC # BLD AUTO: 4.88 M/UL (ref 4.2–5.4)
SODIUM SERPL-SCNC: 139 MMOL/L (ref 135–145)
WBC # BLD AUTO: 8.4 K/UL (ref 4.8–10.8)

## 2017-08-03 PROCEDURE — 95951 HCHG EEG-VIDEO-24HR: CPT

## 2017-08-03 PROCEDURE — 700102 HCHG RX REV CODE 250 W/ 637 OVERRIDE(OP): Performed by: INTERNAL MEDICINE

## 2017-08-03 PROCEDURE — 700111 HCHG RX REV CODE 636 W/ 250 OVERRIDE (IP): Performed by: HOSPITALIST

## 2017-08-03 PROCEDURE — 700102 HCHG RX REV CODE 250 W/ 637 OVERRIDE(OP): Performed by: HOSPITALIST

## 2017-08-03 PROCEDURE — A9270 NON-COVERED ITEM OR SERVICE: HCPCS | Performed by: HOSPITALIST

## 2017-08-03 PROCEDURE — A9270 NON-COVERED ITEM OR SERVICE: HCPCS | Performed by: INTERNAL MEDICINE

## 2017-08-03 PROCEDURE — 770006 HCHG ROOM/CARE - MED/SURG/GYN SEMI*

## 2017-08-03 PROCEDURE — 36415 COLL VENOUS BLD VENIPUNCTURE: CPT

## 2017-08-03 PROCEDURE — 85027 COMPLETE CBC AUTOMATED: CPT

## 2017-08-03 PROCEDURE — 80053 COMPREHEN METABOLIC PANEL: CPT

## 2017-08-03 PROCEDURE — 99233 SBSQ HOSP IP/OBS HIGH 50: CPT | Performed by: HOSPITALIST

## 2017-08-03 RX ORDER — ACETAMINOPHEN 325 MG/1
650 TABLET ORAL ONCE
Status: COMPLETED | OUTPATIENT
Start: 2017-08-03 | End: 2017-08-03

## 2017-08-03 RX ORDER — DIPHENHYDRAMINE HYDROCHLORIDE 50 MG/ML
INJECTION INTRAMUSCULAR; INTRAVENOUS
Status: ACTIVE
Start: 2017-08-03 | End: 2017-08-04

## 2017-08-03 RX ORDER — DIPHENHYDRAMINE HYDROCHLORIDE 50 MG/ML
25 INJECTION INTRAMUSCULAR; INTRAVENOUS ONCE
Status: COMPLETED | OUTPATIENT
Start: 2017-08-03 | End: 2017-08-03

## 2017-08-03 RX ORDER — METHYLPREDNISOLONE SODIUM SUCCINATE 40 MG/ML
40 INJECTION, POWDER, LYOPHILIZED, FOR SOLUTION INTRAMUSCULAR; INTRAVENOUS ONCE
Status: COMPLETED | OUTPATIENT
Start: 2017-08-03 | End: 2017-08-03

## 2017-08-03 RX ADMIN — FLUOXETINE 10 MG: 10 CAPSULE ORAL at 08:45

## 2017-08-03 RX ADMIN — DIPHENHYDRAMINE HYDROCHLORIDE 25 MG: 50 INJECTION, SOLUTION INTRAMUSCULAR; INTRAVENOUS at 13:17

## 2017-08-03 RX ADMIN — STANDARDIZED SENNA CONCENTRATE AND DOCUSATE SODIUM 2 TABLET: 8.6; 5 TABLET, FILM COATED ORAL at 21:17

## 2017-08-03 RX ADMIN — METHYLPREDNISOLONE SODIUM SUCCINATE 40 MG: 40 INJECTION, POWDER, FOR SOLUTION INTRAMUSCULAR; INTRAVENOUS at 13:19

## 2017-08-03 RX ADMIN — ACETAMINOPHEN 650 MG: 325 TABLET, FILM COATED ORAL at 13:18

## 2017-08-03 ASSESSMENT — ENCOUNTER SYMPTOMS
HEMOPTYSIS: 0
NERVOUS/ANXIOUS: 1
WHEEZING: 0
BLOOD IN STOOL: 0
DIARRHEA: 0
GASTROINTESTINAL NEGATIVE: 1
INSOMNIA: 1
COUGH: 0
CARDIOVASCULAR NEGATIVE: 1
ABDOMINAL PAIN: 0
HEADACHES: 0
MUSCULOSKELETAL NEGATIVE: 1
CONSTITUTIONAL NEGATIVE: 1
HEARTBURN: 0
FEVER: 0
RESPIRATORY NEGATIVE: 1
SEIZURES: 1
DOUBLE VISION: 0
PALPITATIONS: 0
FOCAL WEAKNESS: 0
CHILLS: 0
NAUSEA: 0
CONSTIPATION: 0
LOSS OF CONSCIOUSNESS: 0
BRUISES/BLEEDS EASILY: 0
EYES NEGATIVE: 1
DIAPHORESIS: 0
VOMITING: 0
DIZZINESS: 0
DEPRESSION: 1

## 2017-08-03 ASSESSMENT — PAIN SCALES - GENERAL
PAINLEVEL_OUTOF10: 3
PAINLEVEL_OUTOF10: 0

## 2017-08-03 NOTE — H&P
PRIMARY CARE PHYSICIAN:  TRINI Thomas.    CHIEF COMPLAINT:  Depression.    HISTORY OF PRESENT ILLNESS:  Patient is a 27-year-old female with history of   depression and seizure who actually presented to the ER yesterday for a major   episode of depression and also having suicidal ideation.  As per patient, she   was diagnosed with depression probably a month ago and also she has history of   seizure which was diagnosed probably a couple of months ago for which she was   started on Keppra, but then in the last couple of days, actually since   Sunday, she started feeling down and she could not even sleep at all at night   and on Monday, her depression got worse to the point that she was   contemplating like suicidal ideation, so she came to the ER at that time.  She   also was unable to get sleep.  She has been here in the ER last night.  She   was placed actually on legal hold and was seen by psychiatry which actually   reevaluated her again today and felt that she was a little bit better and she   has been off and then discontinued her legal hold, but as per psychiatry, they   think probably her Keppra is making her depression worse.  So they   recommended to probably switch her seizure medication around so neurology, Dr. Celis has already been consulted by the ER physician and recommended   patient to be admitted at this time to arrange switching her seizure   medication around.  Otherwise, patient at this time denies any headaches.  No   nausea or vomiting.  No chest pain or shortness of breath.    REVIEW OF SYSTEMS:  All negative except as per HPI.    PAST MEDICAL HISTORY:  History of depression and seizures.    PAST SURGICAL HISTORY:  Appendectomy.    FAMILY HISTORY:  Mother with history of psychiatric issues like bipolar and   depression and also history of ovarian cancer.  Father with history of MI at   the age of 48-49.    ALLERGIES:  No known allergies.    SOCIAL HISTORY:  Denies any tobacco.   Occasional alcohol, no drug use.    HOME MEDICATIONS:  1.  Prozac 10 mg 1 cap by mouth every day.  2.  Keppra 1000 mg every day.  3.  Ibuprofen 200 mg every 6 hours as needed.    PHYSICAL EXAMINATION:  VITAL SIGNS:  Blood pressure 136/88, respirations 16, pulse 75, temperature   37.7.  GENERAL:  No acute distress, nontoxic appearance.  HEENT:  Normocephalic, atraumatic.  Pupils are equal, round and reactive to   light.  NECK:  Supple.  No adenopathy.  CARDIOVASCULAR:  S1, S2 normal.  No murmur or gallops appreciated.  LUNGS:  Clear to auscultation bilaterally.  No rales, rhonchi or wheezing.  ABDOMEN:  Soft, nontender, positive bowel sounds.  EXTREMITIES:  No edema, cyanosis, or clubbing.  NEUROLOGIC:  No focal deficit.  Alert and oriented x4.    ASSESSMENT AND PLAN:  1.  Major depressive with episode of major depressive disorder, has improved.    Patient was seen by psych while she was in the ER.  Her legal hold has been   discontinued, but per psych, her depression is probably worsened by her   seizure medication which his Keppra.  2.  History of seizure disorders for which she is on Keppra, but the psych   recommended to switch her around to other seizure medication since her depression has   been worsened by  Keppra.  So neurology has been consulted, Dr. Celis,   and patient will be admitted in the hospital.  An EEG has already been ordered   by the neurology and plan is to switch her around to different seizure   medication.  We will hold off on Keppra at this time.  We will place her on   Ativan IV as needed for seizures.  We will admit the patient to neuro floor,   continue to monitor.  3.  Prophylactic deep venous thrombosis, we will start patient on heparin   subcutaneously.  4.  Code status:  Patient is a full code.       ____________________________________     MD MATHEW JOVEL / MEÑO    DD:  08/02/2017 13:45:57  DT:  08/02/2017 16:34:42    D#:  [id number]  Job#:  [id number]

## 2017-08-03 NOTE — PROGRESS NOTES
Stefani Gauthier Fall Risk Assessment:     Last Known Fall: No falls  Mobility: No limitations  Medications: No meds  Mental Status/LOC/Awareness: Awake, alert, and oriented to date, place, and person  Toileting Needs: No needs  Volume/Electrolyte Status: No problems  Communication/Sensory: No deficits  Behavior: Appropriate behavior  Stefani Gauthier Fall Risk Total: 1  Fall Risk Level: NO RISK    Universal Fall Precautions:  call light/belongings in reach, bed in low position and locked, wheelchairs and assistive devices out of sight, siderails up x 2, use non-slip footwear, adequate lighting, clutter free and spill free environment, educate on level of risk, educate to call for assistance    Fall Risk Level Interventions:          Patient Specific Interventions:     Medication: review medications with patient and family, assess for medications that can be discontinued or dosage decreased and limit combination of prn medications  Mental Status/LOC/Awareness: reinforce falls education and reinforce the use of call light  Toileting: not applicable  Volume/Electrolyte Status: ensure patient remains hydrated  Communication/Sensory: update plan of care on whiteboard and ensure patient has glasses/contacts and hearing aids/dentures  Behavioral: encourage patient to voice feelings, administer medication as ordered and instruct/reinforce fall program rationale  Mobility: ensure bed is locked and in lowest position

## 2017-08-03 NOTE — ASSESSMENT & PLAN NOTE
Patient has developed seizures about 2 months ago. She was placed at that time on Keppra. She needs to be changed on her antiseizure medications as with the Keppra she has now become more suicidal. Patient is set up for 48 hour EEG monitoring. She will now be changed over to alternate agents after the 48 hour EEG monitoring. Neurology is following.

## 2017-08-03 NOTE — PROGRESS NOTES
Stefani Gauthier Fall Risk Assessment:     Last Known Fall: No falls  Mobility: No limitations  Medications: No meds  Mental Status/LOC/Awareness: Awake, alert, and oriented to date, place, and person  Toileting Needs: No needs  Volume/Electrolyte Status: No problems  Communication/Sensory: No deficits  Behavior: Appropriate behavior  Stefani Gauthier Fall Risk Total: 1  Fall Risk Level: NO RISK    Universal Fall Precautions:  call light/belongings in reach, bed in low position and locked, siderails up x 2, use non-slip footwear, adequate lighting, clutter free and spill free environment, educate on level of risk, educate to call for assistance    Fall Risk Level Interventions:          Patient Specific Interventions:     Medication: not applicable  Mental Status/LOC/Awareness: reorient patient, reinforce falls education, check on patient hourly, reinforce the use of call light and provide activity  Toileting: provide frquent toileting  Volume/Electrolyte Status: ensure patient remains hydrated, administer medications as ordered for nausea and vomiting and monitor abnormal lab values  Communication/Sensory: update plan of care on whiteboard and ensure patient has glasses/contacts and hearing aids/dentures  Behavioral: encourage patient to voice feelings, engage patient in daily activities and administer medication as ordered  Mobility: ensure bed is locked and in lowest position

## 2017-08-03 NOTE — ASSESSMENT & PLAN NOTE
Has become more severe secondary to the Keppra, she's developed actually suicidal ideation and thoughts. The patient does reported to the emergency room for this and she has been taken off the Keppra. She will be continued on her Prozac. We are going to use when necessary Ativan to control her seizures.
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
## 2017-08-03 NOTE — PROGRESS NOTES
Pt expresses she was having panic attack. Pt states trouble controlling her breathing. Pt expresses pain in her right arm with numbness and tingling. Stayed with pt, put on relaxing music, helped control her breathing, O2 2L put on for comfort. MD cantor.

## 2017-08-03 NOTE — EEG PROGRESS NOTE
EEG 08/03/2017 9:18 AM      So far, Video EEG-- not remarkable, no events, no epileptiform discharges

## 2017-08-03 NOTE — DIETARY
"Nutrition Services: Consult for Poor PO  27 year old female with admit dx of depression, seizure  Past Pertinent Med Hx: depression, seizures    Pt states her appetite is \"so-so\". Pt unsure of wt loss and reports her UBW at 174 lbs (79.1 kg), since last weighed on Monday. Wt loss percent is 3.9%, which is severe. Pt states she knows she has to eat. Pt requested cheese and crackers for a snack. Per chart pt PO < 25-50% 2 meals recorded.     Ht: 162.6 cm  Wt 8/1: 76 kg via stand up scale  BMI: 28.75  Diet: Regular  Pertinent Labs: Glucose 119  Pertinent Meds: prozac, pericolace  Fluids: No IV fluids noted in MAR  Skin: No skin breakdown noted in chart  GI: Last BM 8/1    PLAN/RECOMMEND -   1) Nutrition rep to see patient daily for meal and snack preferences.  2) Encourage PO  3) Weekly weights to monitor fluid and nutrition status  4) Obtain supplement order per RD as needed.    RD following    "

## 2017-08-03 NOTE — PROGRESS NOTES
[facility] Hospitalist Progress Note    Date of Service: 8/3/2017    Chief Complaint  27 y.o. female admitted 2017 with worsening depression and suicidal ideation    Interval Problem Update  Mrs. Pino is a 27-year-old female who about 2 months ago developed seizure disorder. At that point the patient was placed on Keppra. Over the past week or so the patient's depression has gotten extremely worse to the point where she is now developing suicidal thoughts. Patient is scant to the emergency room to be evaluated. After psychiatry of value to patient they are recommending to have the patient taken off of Keppra. Keppra does have a known side effect of worsening depression and developing suicidal ideations. At this point patient was placed on 48 hour EEG monitoring in house. Afterwards neurology will recommend new anti-seizure management.    Consultants/Specialty  Neurology, psychiatry    Disposition  To be determined        Review of Systems   Constitutional: Negative.  Negative for fever, chills and diaphoresis.   HENT: Negative.    Eyes: Negative.  Negative for double vision.   Respiratory: Negative.  Negative for cough, hemoptysis and wheezing.    Cardiovascular: Negative.  Negative for chest pain, palpitations and leg swelling.   Gastrointestinal: Negative.  Negative for heartburn, nausea, vomiting, abdominal pain, diarrhea, constipation and blood in stool.   Genitourinary: Negative.  Negative for urgency, frequency and hematuria.   Musculoskeletal: Negative.  Negative for joint pain.   Skin: Negative.  Negative for itching and rash.   Neurological: Positive for seizures. Negative for dizziness, focal weakness, loss of consciousness and headaches.   Endo/Heme/Allergies: Negative.  Does not bruise/bleed easily.   Psychiatric/Behavioral: Positive for depression and suicidal ideas. The patient is nervous/anxious and has insomnia.       Physical Exam  Laboratory/Imaging   Hemodynamics  Temp (24hrs), Av.7 °C (98  °F), Min:36.4 °C (97.5 °F), Max:36.9 °C (98.4 °F)   Temperature: 36.7 °C (98.1 °F)  Pulse  Av.3  Min: 57  Max: 80    Blood Pressure: 133/75 mmHg      Respiratory      Respiration: 16, Pulse Oximetry: 99 %             Fluids  No intake or output data in the 24 hours ending 17 1608    Nutrition  Orders Placed This Encounter   Procedures   • DIET ORDER     Standing Status: Standing      Number of Occurrences: 1      Standing Expiration Date:      Order Specific Question:  Diet:     Answer:  Regular [1]      Comments:  no mushrooms     Physical Exam   Constitutional: She is oriented to person, place, and time. She appears well-developed and well-nourished.   HENT:   Head: Normocephalic and atraumatic.   Right Ear: External ear normal.   Left Ear: External ear normal.   Nose: Nose normal.   Mouth/Throat: Oropharynx is clear and moist.   Eyes: Conjunctivae and EOM are normal. Pupils are equal, round, and reactive to light.   Neck: Normal range of motion. Neck supple. No JVD present. No thyromegaly present.   Cardiovascular: Normal rate and regular rhythm.    No murmur heard.  Pulmonary/Chest: She has no wheezes. She has no rales. She exhibits no tenderness.   Abdominal: She exhibits no distension and no mass. There is no tenderness. There is no rebound and no guarding.   Genitourinary:   Clay catheter   Musculoskeletal: Normal range of motion. She exhibits no edema or tenderness.   Lymphadenopathy:     She has no cervical adenopathy.   Neurological: She is alert and oriented to person, place, and time. She has normal reflexes. No cranial nerve deficit.   Skin: Skin is warm and dry. No rash noted. No erythema.   Psychiatric: Her behavior is normal. Judgment normal. Cognition and memory are normal. She exhibits a depressed mood. She expresses suicidal ideation.   Nursing note and vitals reviewed.      Recent Labs      17   1342  17   0138   WBC  7.4  8.4   RBC  4.93  4.88   HEMOGLOBIN  15.1  15.0    HEMATOCRIT  43.6  43.5   MCV  88.4  89.1   MCH  30.6  30.7   MCHC  34.6  34.5   RDW  39.1  38.1   PLATELETCT  340  326   MPV  9.8  9.8     Recent Labs      08/02/17   1342  08/03/17   0138   SODIUM  139  139   POTASSIUM  3.5*  3.8   CHLORIDE  107  108   CO2  22  23   GLUCOSE  69  119*   BUN  9  12   CREATININE  0.66  0.83   CALCIUM  9.3  8.9                      Assessment/Plan     Seizure (CMS-[facility]) (present on admission)  Assessment & Plan  Patient has developed seizures about 2 months ago. She was placed at that time on Keppra. She needs to be changed on her antiseizure medications as with the Keppra she has now become more suicidal. Patient is set up for 48 hour EEG monitoring. She will now be changed over to alternate agents after the 48 hour EEG monitoring. Neurology is following.    Depression (present on admission)  Assessment & Plan  Has become more severe secondary to the Keppra, she's developed actually suicidal ideation and thoughts. The patient does reported to the emergency room for this and she has been taken off the Keppra. She will be continued on her Prozac. We are going to use when necessary Ativan to control her seizures.    EKG reviewed, Radiology images reviewed, Labs reviewed and Medications reviewed  Clay catheter: No Clay      DVT Prophylaxis: Heparin    Ulcer prophylaxis: Not indicated

## 2017-08-03 NOTE — PROGRESS NOTES
NEUROLOGY PROGRESS NOTE      Background:  27 y.o. female was admitted on 8/1/2017  5:20 AM for Depression  Seizure (CMS-HCC)  Depression  Seizure (CMS-HCC).  She is being followed by Neurology for adjusting seizure medication.    SUBJECTIVE: She is doing well no seizure activity reported. So far her EEG monitoring has not revealed any epileptiform activity.    NEUROLOGICAL EXAM: Orientation to time, place, and person were normal.  Recent and remote memory were normal.  Attention span and concentration were normal.  Language (naming, repetition, spontaneous speech) was normal.  Fund of knowledge was normal for age and education.  All cranial nerves were normal: Pupils were equally round and reactive to light.  Motor: (tone, bulk and strength): 5/5 strength in both upper and lower extremity, proximal and distal - no abnormal movement noted.  Patient has normal muscle bulk and tone.  Sensation (all modalities) was normal  Reflexes were normal and symmetrical in both upper and lower extremities  Coordination (finger-to-nose, heel/knee/shin, rapid alternating movements) was normal. There was no ataxia, no tremors, and no dysmetria. Station and gait were normal      OBJECTIVE:      EEG: So far her EEG monitoring has been negative for epileptiform activity.    MEDICATIONS:  Current Facility-Administered Medications   Medication Dose   • lorazepam (ATIVAN) injection 2 mg  2 mg   • senna-docusate (PERICOLACE or SENOKOT S) 8.6-50 MG per tablet 2 Tab  2 Tab    And   • polyethylene glycol/lytes (MIRALAX) PACKET 1 Packet  1 Packet    And   • magnesium hydroxide (MILK OF MAGNESIA) suspension 30 mL  30 mL    And   • bisacodyl (DULCOLAX) suppository 10 mg  10 mg   • heparin injection 5,000 Units  5,000 Units   • lorazepam (ATIVAN) injection 1 mg  1 mg   • fluoxetine (PROZAC) capsule 10 mg  10 mg   • ibuprofen (MOTRIN) tablet 400 mg  400 mg       Blood pressure 118/80, pulse 72, temperature 36.7 °C (98.1 °F), temperature source  "Temporal, resp. rate 18, height 1.626 m (5' 4.02\"), weight 76 kg (167 lb 8.8 oz), last menstrual period 07/15/2017, SpO2 98 %, not currently breastfeeding.        Recent Labs      08/02/17   1342  08/03/17   0138   WBC  7.4  8.4   RBC  4.93  4.88   HEMOGLOBIN  15.1  15.0   HEMATOCRIT  43.6  43.5   MCV  88.4  89.1   MCH  30.6  30.7   MCHC  34.6  34.5   RDW  39.1  38.1   PLATELETCT  340  326   MPV  9.8  9.8     Recent Labs      08/02/17   1342  08/03/17   0138   SODIUM  139  139   POTASSIUM  3.5*  3.8   CHLORIDE  107  108   CO2  22  23   GLUCOSE  69  119*   BUN  9  12       No results found for this or any previous visit.     ASSESSMENT AND PLAN:   A 27-year-old female with a history of seizure disorder    who presented with severe depression.  We will continue with VQ EEG monitoring off seizure medication.  So far no clinical seizure reported in her EEG monitoring has been negative for epileptiform activity.  She will be off of her seizure medication and we will treat    breakthrough seizure with Ativan.    "

## 2017-08-03 NOTE — PROGRESS NOTES
Writer responded to call light, pt states she accidentally had some of the food for lunch containing mushrooms. Pt states she forgot to notify regarding food allergy, states she feels like her throat is a little itchy. Pt states this has happened before and usually takes one tab of benadryl 25mg PO. MD notified. MD ordered Benadryl 25mg IV once, Solu-medrol 40mg IV once, and tylenol 650mg PO once. Pt is a/o x 4, speech clear and appropriate, respirations even and unlabored, lungs CTA bilaterally.

## 2017-08-04 VITALS
RESPIRATION RATE: 12 BRPM | DIASTOLIC BLOOD PRESSURE: 70 MMHG | WEIGHT: 167.55 LBS | OXYGEN SATURATION: 97 % | SYSTOLIC BLOOD PRESSURE: 121 MMHG | TEMPERATURE: 98 F | HEART RATE: 73 BPM | BODY MASS INDEX: 28.6 KG/M2 | HEIGHT: 64 IN

## 2017-08-04 PROCEDURE — A9270 NON-COVERED ITEM OR SERVICE: HCPCS | Performed by: EMERGENCY MEDICINE

## 2017-08-04 PROCEDURE — A9270 NON-COVERED ITEM OR SERVICE: HCPCS | Performed by: INTERNAL MEDICINE

## 2017-08-04 PROCEDURE — 700102 HCHG RX REV CODE 250 W/ 637 OVERRIDE(OP): Performed by: INTERNAL MEDICINE

## 2017-08-04 PROCEDURE — 99239 HOSP IP/OBS DSCHRG MGMT >30: CPT | Performed by: HOSPITALIST

## 2017-08-04 PROCEDURE — 700102 HCHG RX REV CODE 250 W/ 637 OVERRIDE(OP): Performed by: EMERGENCY MEDICINE

## 2017-08-04 RX ORDER — LACOSAMIDE 50 MG/1
50 TABLET ORAL 2 TIMES DAILY
Qty: 60 TAB | Refills: 3 | Status: SHIPPED | OUTPATIENT
Start: 2017-08-04 | End: 2017-08-04

## 2017-08-04 RX ORDER — LACOSAMIDE 50 MG/1
50 TABLET ORAL 2 TIMES DAILY
Qty: 60 TAB | Refills: 3 | Status: SHIPPED | OUTPATIENT
Start: 2017-08-04 | End: 2017-11-06

## 2017-08-04 RX ADMIN — FLUOXETINE 10 MG: 10 CAPSULE ORAL at 08:10

## 2017-08-04 RX ADMIN — IBUPROFEN 400 MG: 800 TABLET, FILM COATED ORAL at 08:13

## 2017-08-04 RX ADMIN — STANDARDIZED SENNA CONCENTRATE AND DOCUSATE SODIUM 1 TABLET: 8.6; 5 TABLET, FILM COATED ORAL at 08:14

## 2017-08-04 NOTE — PROGRESS NOTES
NEUROLOGY INPATIENT PROGRESS NOTE      CHIEF COMPLAINT:  Chief Complaint   Patient presents with   • Headache     x 3 days   • Suicidal Ideation       DOS  Pt was seen and examined on 08/04/2017    INTERVAL EVENTS:  She has been on continuous EEG monitoring. She reports one spell last night at around 8 PM, but EEG showed no clinical correlate. However, there was some spikes from the right hemisphere this morning.      ROS:  No headaches  No fevers of chills    MEDICATIONS    Current facility-administered medications:   •  lorazepam (ATIVAN) injection 2 mg, 2 mg, Intravenous, Q HOUR PRN, Heather Celis M.D.  •  senna-docusate (PERICOLACE or SENOKOT S) 8.6-50 MG per tablet 2 Tab, 2 Tab, Oral, BID, 1 Tab at 08/04/17 0814 **AND** polyethylene glycol/lytes (MIRALAX) PACKET 1 Packet, 1 Packet, Oral, QDAY PRN **AND** magnesium hydroxide (MILK OF MAGNESIA) suspension 30 mL, 30 mL, Oral, QDAY PRN **AND** bisacodyl (DULCOLAX) suppository 10 mg, 10 mg, Rectal, QDAY PRN, Navya Palafox M.D.  •  heparin injection 5,000 Units, 5,000 Units, Subcutaneous, Q8HRS, Navya Palafox M.D., 5,000 Units at 08/02/17 1400  •  lorazepam (ATIVAN) injection 1 mg, 1 mg, Intravenous, Q4HRS PRN, Navya Palafox M.D.  •  fluoxetine (PROZAC) capsule 10 mg, 10 mg, Oral, DAILY, Navya Palafox M.D., 10 mg at 08/04/17 0810  •  ibuprofen (MOTRIN) tablet 400 mg, 400 mg, Oral, Q6HRS PRN, Christy Velez M.D., 400 mg at 08/04/17 0813    Current outpatient prescriptions:   •  lacosamide (VIMPAT) 50 MG Tab tablet, Take 1 Tab by mouth 2 Times a Day., Disp: 60 Tab, Rfl: 3  •  fluoxetine (PROZAC) 10 MG Cap, Take 1 Cap by mouth every day., Disp: 30 Cap, Rfl: 3  •  ibuprofen (MOTRIN) 200 MG Tab, Take 400 mg by mouth every 6 hours as needed for Mild Pain., Disp: , Rfl:       PHYSICAL EXAM  Filed Vitals:    08/04/17 0000 08/04/17 0400 08/04/17 0700 08/04/17 1100   BP: 108/54 121/64 118/68 121/70   Pulse: 58 65 74 73   Temp: 36.5 °C (97.7 °F) 36.5 °C (97.7 °F) 36.7 °C (98 °F) 36.7  °C (98 °F)   TempSrc:       Resp: 20 20 16 12   Height:       Weight:       SpO2: 97% 98% 97% 97%     PT is alert, lying in bed, NAD  Neuro: pt is alert, answers questions appropriately. There is no aphasia or dysarthria  CN: PERRLA, EOMI, no facial asymemtry  Motor: 5/5 throughout in all 4 extremities  Coordination: FNF is intact b/l    Sensation: intact to soft touch in all 4 extremities    LABS:    Lab Results   Component Value Date/Time    WBC 8.4 08/03/2017 01:38 AM    RBC 4.88 08/03/2017 01:38 AM    HEMOGLOBIN 15.0 08/03/2017 01:38 AM    HEMATOCRIT 43.5 08/03/2017 01:38 AM    MCV 89.1 08/03/2017 01:38 AM    MCH 30.7 08/03/2017 01:38 AM    MCHC 34.5 08/03/2017 01:38 AM    MPV 9.8 08/03/2017 01:38 AM    NEUTROPHILS-POLYS 64.50 08/02/2017 01:42 PM    LYMPHOCYTES 24.10 08/02/2017 01:42 PM    MONOCYTES 9.30 08/02/2017 01:42 PM    EOSINOPHILS 1.10 08/02/2017 01:42 PM    BASOPHILS 0.70 08/02/2017 01:42 PM        Lab Results   Component Value Date/Time    SODIUM 139 08/03/2017 01:38 AM    POTASSIUM 3.8 08/03/2017 01:38 AM    CHLORIDE 108 08/03/2017 01:38 AM    CO2 23 08/03/2017 01:38 AM    GLUCOSE 119* 08/03/2017 01:38 AM    BUN 12 08/03/2017 01:38 AM    CREATININE 0.83 08/03/2017 01:38 AM        No results found for: CHOLSTRLTOT, LDL, HDL, TRIGLYCERIDE      Lab Results   Component Value Date/Time    ALKALINE PHOSPHATASE 45 08/03/2017 01:38 AM    AST(SGOT) 13 08/03/2017 01:38 AM    ALT(SGPT) 18 08/03/2017 01:38 AM    TOTAL BILIRUBIN 0.5 08/03/2017 01:38 AM          IMAGING STUDIES:      IMPRESSION: Natasha Pino is a 27 y.o. Female with seizure d/o who was admitted for severe depression after starting Keppra.  She also had severe nausea on Lamicatal XR 25 mg daily.  Her EEG did showed some spikes from the right hemisphere suggestive on an underlying seizure d/o.     I had a long conversation with her about the need for seizure medications and the side effects of the different seizure medications. She is  agreeing to try Vimpat 50 mg BID.  SHe should follow up with DR. Anguiano who can increase the dose if she can tolerate it.  She knows not to drive until cleared by a doctor.        I spent 25 minutes with the patient of which >50% of the time was spent in counseling her on her seizure d/o.     Yue Heredia M.D.     Neurology

## 2017-08-04 NOTE — EEG PROGRESS NOTE
EEG 08/04/2017 8:14 AM      So far, Video EEG-- no electrographic seizure events, but there were 2 epileptiform discharges from right hemisphere  (Right frontal) this - -- one event                   Event button activated once  8/3/2017-- but this was not associated with epileptiform discharges  Patient sat up and bent her back

## 2017-08-04 NOTE — DISCHARGE PLANNING
Pt had Legal Hold discontinued by Dr Hutton 8/2/2017 at 09:46.  Pt currently having video EEG, will be completed later today. Neurology following.

## 2017-08-04 NOTE — CARE PLAN
Problem: Communication  Goal: The ability to communicate needs accurately and effectively will improve  Outcome: PROGRESSING AS EXPECTED  Assessed patient communication ability    Problem: Safety  Goal: Will remain free from injury  Outcome: PROGRESSING AS EXPECTED  Fall and seizure precautions in place, assisted with ambulation

## 2017-08-04 NOTE — PROGRESS NOTES
Patient stood up with this RN and c/o lightheadedness. Patient stated that she needed to sit down and lowered onto bed with assistance of this RN. Patient was shaking with entire body and became minimally talkative, but remained conscious and following commands. Patient sat self up in bed and stated that she was sorry and needed to lie down. Patient was alert, oriented, and appeared back to baseline when shaking stopped.

## 2017-08-04 NOTE — PROGRESS NOTES
Assumed patient care at 1900, bedside report completed. Patient alert, oriented, and resting in bed in no apparent distress. Plan of care reviewed, and patient denying pain meds or needs at this time. Patient states that she has a pain of 3/10 in her R shoulder, but does not require medication. Patient states she has not had a bowel movement since admission, and agrees to take stool softener. Fall precautions in place and call light within reach. EEG in place.

## 2017-08-04 NOTE — DISCHARGE PLANNING
Pt will Dc on Vimpat, scrip faxed to [employer] to determine if prior auth is required and if not what the pts co-pay will be.     Received call back from [employer]. Pts prescription requires no prior auth. Co-pay is $60. Hospitalist RN updated.

## 2017-08-04 NOTE — PROGRESS NOTES
Stefani Gauthier Fall Risk Assessment:     Last Known Fall: No falls  Mobility: No limitations  Medications: No meds  Mental Status/LOC/Awareness: Awake, alert, and oriented to date, place, and person  Toileting Needs: No needs  Volume/Electrolyte Status: No problems  Communication/Sensory: No deficits  Behavior: Appropriate behavior  Stefani Gauthier Fall Risk Total: 1  Fall Risk Level: NO RISK    Universal Fall Precautions:  call light/belongings in reach, bed in low position and locked, siderails up x 2, use non-slip footwear, adequate lighting, clutter free and spill free environment, educate on level of risk, educate to call for assistance    Fall Risk Level Interventions:          Patient Specific Interventions:     Medication: review medications with patient and family  Mental Status/LOC/Awareness: reinforce falls education, check on patient hourly and utilize bed/chair fall alarm  Toileting: do not leave patient unattended in bathroom/refer to toileting scripting  Volume/Electrolyte Status: ensure patient remains hydrated  Communication/Sensory: update plan of care on whiteboard  Behavioral: encourage patient to voice feelings and instruct/reinforce fall program rationale  Mobility: ensure bed is locked and in lowest position

## 2017-08-04 NOTE — DISCHARGE INSTRUCTIONS
Discharge patient Home   Diet regular with 9-13 servings of fruits and vegetables   Activities as tolerated   Follow ups with PCP in 7-10 days, call for appointment   Neurology appointment to be scheduled in the next 7-10 days.   Meds per med rec sheet   No smoking, no alcohol, no caffeine   Wear seat belt in motorized vehicle   Take medications as perscribed   Keep appointments   If symptoms worsen call PCP, 911 or urgent care.    Discharge Instructions    Discharged to home by car with relative. Discharged via wheelchair, hospital escort: Yes.  Special equipment needed: Not Applicable    Be sure to schedule a follow-up appointment with your primary care doctor or any specialists as instructed.     Discharge Plan:   Influenza Vaccine Indication: Indicated: Not available from distributor/    I understand that a diet low in cholesterol, fat, and sodium is recommended for good health. Unless I have been given specific instructions below for another diet, I accept this instruction as my diet prescription.   Other diet: regular    Special Instructions: None    · Is patient discharged on Warfarin / Coumadin?   No     · Is patient Post Blood Transfusion?  No    Depression / Suicide Risk    As you are discharged from this [facility] facility, it is important to learn how to keep safe from harming yourself.    Recognize the warning signs:  · Abrupt changes in personality, positive or negative- including increase in energy   · Giving away possessions  · Change in eating patterns- significant weight changes-  positive or negative  · Change in sleeping patterns- unable to sleep or sleeping all the time   · Unwillingness or inability to communicate  · Depression  · Unusual sadness, discouragement and loneliness  · Talk of wanting to die  · Neglect of personal appearance   · Rebelliousness- reckless behavior  · Withdrawal from people/activities they love  · Confusion- inability to concentrate     If you or a  loved one observes any of these behaviors or has concerns about self-harm, here's what you can do:  · Talk about it- your feelings and reasons for harming yourself  · Remove any means that you might use to hurt yourself (examples: pills, rope, extension cords, firearm)  · Get professional help from the community (Mental Health, Substance Abuse, psychological counseling)  · Do not be alone:Call your Safe Contact- someone whom you trust who will be there for you.  · Call your local CRISIS HOTLINE 385-9737 or 650-246-2916  · Call your local Children's Mobile Crisis Response Team [place] (917) 935-6291 or www.[employer]  · Call the toll free National Suicide Prevention Hotlines   · National Suicide Prevention Lifeline 690-556-MCAP (9291)  · National Hope Line Network 800-SUICIDE (626-7063)

## 2017-08-04 NOTE — DISCHARGE SUMMARY
CHIEF COMPLAINT ON ADMISSION  Chief Complaint   Patient presents with   • Headache     x 3 days   • Suicidal Ideation       CODE STATUS  Full Code    HPI & HOSPITAL COURSE  This is a 27 y.o. female here with worsening depression and suicidal thoughts. The patient 2 months ago experienced seizures were to 1st time in her life. At that point she was evaluated and found to have seizures on EEG, she was placed on Keppra and discharged home with outpatient follow-ups. The patient noticed over time that her depression however kept getting worse and worse after about 2 months of being on the Keppra the patient's depression got so severe she was having suicidal thoughts. With this the patient came to the emergency room to be evaluated. Psychiatry was able to see the patient and they recommended that the patient immediately come off Keppra. At that point neurology felt that she would need a 48 hour EEG monitoring to be set up which was performed. On this the patient did have a small seizure. It was however not a generalized tonic-clonic seizure patient did appreciate the seizure though. Since the patient is of childbearing age to patient was not elected to be placed on phenytoin. She is at this point placed on Vimpat. Preapproval was obtained and she was able to afford the medication. At this point patient will be discharged with a new antiseizure medication and she will follow up with outpatient neurology for further management and recommendations.    Therefore, she is discharged in good and stable condition with close outpatient follow-up.    SPECIFIC OUTPATIENT FOLLOW-UP  Neurology    DISCHARGE PROBLEM LIST  Active Problems:    Seizure (CMS-HCC) POA: Yes    Depression POA: Yes  Resolved Problems:    * No resolved hospital problems. *      FOLLOW UP  Future Appointments  Date Time Provider Department [place]   8/8/2017 10:00 AM CARE MANAGER [facility]   8/10/2017 12:40 PM AMADEO Fenton   8/29/2017  9:00 AM [place]NOSTIC [place] [facility] [place]   11/10/2017 8:00 AM Lino Anguiano M.D. [facility] None     No follow-up provider specified.    MEDICATIONS ON DISCHARGE   Natasha Pino   Home Medication Instructions NICOLETTE:[id number]    Printed on:08/04/17 4413   Medication Information                      fluoxetine (PROZAC) 10 MG Cap  Take 1 Cap by mouth every day.             ibuprofen (MOTRIN) 200 MG Tab  Take 400 mg by mouth every 6 hours as needed for Mild Pain.             lacosamide (VIMPAT) 50 MG Tab tablet  Take 1 Tab by mouth 2 Times a Day.                 DIET  Orders Placed This Encounter   Procedures   • DIET ORDER     Standing Status: Standing      Number of Occurrences: 1      Standing Expiration Date:      Order Specific Question:  Diet:     Answer:  Regular [1]      Comments:  no mushrooms       ACTIVITY  As tolerated.  Weight bearing as tolerated      CONSULTATIONS  Neurology    PROCEDURES  EEG monitoring for 48 hours    LABORATORY  Lab Results   Component Value Date/Time    SODIUM 139 08/03/2017 01:38 AM    POTASSIUM 3.8 08/03/2017 01:38 AM    CHLORIDE 108 08/03/2017 01:38 AM    CO2 23 08/03/2017 01:38 AM    GLUCOSE 119* 08/03/2017 01:38 AM    BUN 12 08/03/2017 01:38 AM    CREATININE 0.83 08/03/2017 01:38 AM        Lab Results   Component Value Date/Time    WBC 8.4 08/03/2017 01:38 AM    HEMOGLOBIN 15.0 08/03/2017 01:38 AM    HEMATOCRIT 43.5 08/03/2017 01:38 AM    PLATELET COUNT 326 08/03/2017 01:38 AM        Total time of the discharge process exceeds 38 minutes

## 2017-08-05 NOTE — PROGRESS NOTES
Pt d/c'd home per md order.  Discussed d/c instructions with pt.  Iv removed.  Pt looking forward to going home. Pt declined a w/c and ambulated out to car.

## 2017-08-08 ENCOUNTER — PATIENT OUTREACH (OUTPATIENT)
Dept: HEALTH INFORMATION MANAGEMENT | Facility: OTHER | Age: 27
End: 2017-08-08

## 2017-08-08 NOTE — EEG PROGRESS NOTE
VIDEO ELECTROENCEPHALOGRAM / EPILEPSY MONITORING UNIT REPORT          DOS:   8/2/2017 - 8/4/2017      INDICATION:    history of  depression and seizure who actually presented to the ER yesterday for a major    episode of depression and also having suicidal ideation    CURRENT ANTIEPILEPTIC REGIMEN:        TECHNIQUE: A 30-channel, 24 hrs video electroencephalogram (VEEG) was performed in accordance with the international 10-20 system. This digital study was reviewed in bipolar and referential montages. The recording examined the patient during wakeful, drowsy and sleep states. The patient appears sedated at times.     DESCRIPTION OF THE RECORD:    On 8/2/2017 Background rhythm during awake stage shows well-organized, well-developed, average voltage 10 to 11 hertz alpha activity in the posterior regions.  It blocks with eye opening and it is bilaterally synchronous and symmetrical.  No spike-and-wave discharges or any lateralizing abnormalities are seen.  No abnormalities were found during the procedure. Stage I sleep was achieved.    On 8/3/2017 Background rhythm during awake stage shows well-organized, well-developed, average voltage 10 to 11 hertz alpha activity in the posterior regions.  It blocks with eye opening and it is bilaterally synchronous and symmetrical.  No spike-and-wave discharges or any lateralizing abnormalities are seen.   Event button activated once  8/3/2017-- but this was not associated with epileptiform discharges Patient sat up and bent her back Stage I sleep was achieved.    On 8/4/2017 Background rhythm during awake stage shows well-organized, well-developed, average voltage 10 to 11 hertz alpha activity in the posterior regions.  It blocks with eye opening and it is bilaterally synchronous and symmetrical.  2 epileptiform discharges from right hemisphere  (Right frontal) this -   Stage I sleep was achieved.      ACTIVATION PROCEDURES:         EVENT(S):    Event button  activated once  8/3/2017-- but this was not associated with epileptiform discharges  Patient sat up and bent her back    EKG: sampling review of EKG recording demonstrated sinus rhythm.       INTERPRETATION:      ________________________________________________________________________    This prolonged video EEG from 8/2/2017 to 84/4/2017 is consistent with Hx of focal seizure-- focus is probable from right frontal    There were no active seizures recorded in this recording though    One event was documented ( but not epileptiform -- superimposed non-epileptic spell is possible)    2 epileptiform discharges from right hemisphere  (Right frontal) this -   One event button was activated once  8/3/2017-- but this was not associated with epileptiform dischargesPatient sat up and bent her back  ________________________________________________________________________      Sasha Escalera M.D.  NEUROLOGIST  [facility]  3:13 PM08/08/2017              EEG 08/03/2017 9:18 AM      So far, Video EEG-- not remarkable, no events, no epileptiform discharges            EEG 08/04/2017 8:14 AM      So far, Video EEG-- no electrographic seizure events, but there were 2 epileptiform discharges from right hemisphere  (Right frontal) this - -- one event also last night                  Event button activated once  8/3/2017-- but this was not associated with epileptiform discharges  Patient sat up and bent her back

## 2017-08-16 ENCOUNTER — TELEPHONE (OUTPATIENT)
Dept: MEDICAL GROUP | Facility: CLINIC | Age: 27
End: 2017-08-16

## 2017-08-16 ENCOUNTER — OFFICE VISIT (OUTPATIENT)
Dept: MEDICAL GROUP | Facility: CLINIC | Age: 27
End: 2017-08-16
Payer: COMMERCIAL

## 2017-08-16 ENCOUNTER — TELEPHONE (OUTPATIENT)
Dept: NEUROLOGY | Facility: MEDICAL CENTER | Age: 27
End: 2017-08-16

## 2017-08-16 VITALS
HEART RATE: 70 BPM | TEMPERATURE: 98.1 F | HEIGHT: 64 IN | DIASTOLIC BLOOD PRESSURE: 84 MMHG | RESPIRATION RATE: 16 BRPM | WEIGHT: 173 LBS | SYSTOLIC BLOOD PRESSURE: 120 MMHG | OXYGEN SATURATION: 95 % | BODY MASS INDEX: 29.53 KG/M2

## 2017-08-16 DIAGNOSIS — Z09 HOSPITAL DISCHARGE FOLLOW-UP: ICD-10-CM

## 2017-08-16 DIAGNOSIS — F32.2 SEVERE SINGLE CURRENT EPISODE OF MAJOR DEPRESSIVE DISORDER, WITHOUT PSYCHOTIC FEATURES (HCC): ICD-10-CM

## 2017-08-16 DIAGNOSIS — R56.9 SEIZURE (HCC): ICD-10-CM

## 2017-08-16 PROCEDURE — 99495 TRANSJ CARE MGMT MOD F2F 14D: CPT | Performed by: NURSE PRACTITIONER

## 2017-08-16 ASSESSMENT — ENCOUNTER SYMPTOMS
PALPITATIONS: 0
NAUSEA: 1
WEAKNESS: 0
EYE PAIN: 0
FEVER: 0
BLOOD IN STOOL: 0
MYALGIAS: 0
WHEEZING: 0
HEADACHES: 1
SHORTNESS OF BREATH: 0
DEPRESSION: 0
CONSTIPATION: 0
FALLS: 0
FOCAL WEAKNESS: 0
DIARRHEA: 0
NERVOUS/ANXIOUS: 0
COUGH: 0
VOMITING: 0
SPUTUM PRODUCTION: 0
CHILLS: 0
HEARTBURN: 0
ABDOMINAL PAIN: 0
DIZZINESS: 0
BLURRED VISION: 0

## 2017-08-16 NOTE — Clinical Note
Dr. Gautam,  This pt has a unemployment benefit claim form need to be signed. I have filled out the questions. We need your signature and supervising physician note for this pt. The form says physician so that I am not sure if I can sign or not.   Thanks. MASON Mondragon)

## 2017-08-16 NOTE — PROGRESS NOTES
Subjective:     Natasha Pino is a 27 y.o. female who presents for Hospital Follow-up.  Chart reviewed. Discharge summary available for review: Yes   Date of discharge 8/4/2017. Hospitalized from 8/1 to 8/4.  48- hour post discharge RN call completed on 8/8/2017 and documented in the medical record by Terra Alvarado.    HPI: Recently hospitalized for headache, suicidal ideation, depression from seizure medications keppra.    Seizure (CMS-[facility])  Hx of seizure disorder for 10 years but not having recurrent seizure until about 2 months ago, lamotrgine was switched to keppra which causes her severe depression with suicidal ideation. Psychiatrist eveluation done in the hospital. Increase prozac from 10 mg to 20 mg. VEEG done, two epileptiform discharges record, keppra switched to vimpat. However, pt decided not to take any prescribed medications at this time and she is feeling better with this decision. See depression part for more details.     Neurologist appointment: 11/10; 8/29 will have another VEEG.        Severe single current episode of major depressive disorder (CMS-[facility])  Prozac was increased from 10 mg to 20 mg daily but she is not taking it. She decided to go for alternative medicine.   Pt does not feel that she needs to see psychiatrist at this time because she has been feeling better with nature/alternative treatment. She is not taking prozac anymore. She decided to start using medical marijuana which does make her feel better in terms of depression and no episodes of seizure. She denied weakness, no difficulty taking care of self at home but she is unable to work at this time because her 's license is taken away and needs to be re-evaluated by neurologist. In addition, she is told that light exposure could be her trigger for seizure so that she has to avoid sitting in front of computer for too long, need to limit to 4-5 hours per day.          Patient Active Problem List    Diagnosis Date  "Noted   • Seizure (CMS-HCC) 08/02/2017   • Severe single current episode of major depressive disorder (CMS-HCC) 08/02/2017   • Localization-related partial epilepsy with complex partial seizures (CMS-HCC) 05/05/2017   • Migraine without aura and without status migrainosus, not intractable 05/05/2017   • Obesity (BMI 30-39.9) 02/13/2017   • Uses birth control-OCPs 02/13/2017   • Syncope and collapse 02/13/2017         Allergies:   Mushroom extract complex    Social History:  Social History   Substance Use Topics   • Smoking status: Never Smoker    • Smokeless tobacco: Never Used   • Alcohol Use: 0.0 oz/week     0 Standard drinks or equivalent per week      Comment: Socially        ROS:  Review of Systems   Constitutional: Positive for malaise/fatigue. Negative for fever and chills.   HENT: Negative for hearing loss and tinnitus.    Eyes: Negative for blurred vision and pain.   Respiratory: Negative for cough, sputum production, shortness of breath and wheezing.    Cardiovascular: Negative for chest pain, palpitations and leg swelling.   Gastrointestinal: Positive for nausea ( reports marijuana is helping on this.). Negative for heartburn, vomiting, abdominal pain, diarrhea, constipation and blood in stool.   Genitourinary: Negative for dysuria, urgency and frequency.   Musculoskeletal: Negative for myalgias and falls.   Skin: Negative for rash.   Neurological: Positive for headaches ( intermittent headache, chronic, not new to her, tolerable, no change in sevierity or intensity. ). Negative for dizziness, focal weakness and weakness.   Psychiatric/Behavioral: Negative for depression and suicidal ideas. The patient is not nervous/anxious.         Objective:     Blood pressure 120/84, pulse 70, temperature 36.7 °C (98.1 °F), resp. rate 16, height 1.626 m (5' 4\"), weight 78.472 kg (173 lb), last menstrual period 07/15/2017, SpO2 95 %.     Physical Exam:  Physical Exam   Constitutional: She is oriented to person, place, " and time and well-developed, well-nourished, and in no distress.   HENT:   Head: Normocephalic and atraumatic.   Eyes: Conjunctivae are normal.   Neck: Neck supple. No JVD present. No thyromegaly present.   Cardiovascular: Normal rate and regular rhythm.    No murmur heard.  Pulmonary/Chest: Effort normal and breath sounds normal. No respiratory distress. She has no wheezes.   Abdominal: Soft. Bowel sounds are normal. She exhibits no distension. There is no tenderness.   Musculoskeletal: Normal range of motion. She exhibits no edema.   Neurological: She is alert and oriented to person, place, and time.   Skin: Skin is warm. No erythema.   Psychiatric: Affect normal.   Nursing note and vitals reviewed.        Assessment and Plan:     1. Hospital discharge follow-up  Hospitalization and results reviewed with patient. High risk conditions requiring teaching or care coordination were identified and addressed.The patient demonstrate understanding of admission and underlying conditions. The patient understands discharge instructions and when to seek medical attention. Medications reviewed including instructions regarding high risk medications, dosing and side effects.    The patient is able to safely adhere to ADL/IADL, treatment and medication regimen, self-manage of high-risk conditions? Yes   The patient requires physical therapy/home health/DME referral? No   The patient requires referral to care coordination/behavioral health/social work?  No   Patient requires referral for pharmacy consult? No   Advance directive/POLST on file?  No   Required counseled on advance directive?  No     - request to fill out medical statement for her employer. Will fill out and fax to [phone] as on instruction.  - MA to assist to schedule PCP appointment (scheduled on 9/12)    2. Seizure (CMS-HCC)  - Follow up with neurologist on 11/10. Message left to neurologist to see if we could get sooner appointment.   - pt stop medication,  vimpat on her own and decided to start using marijuana. Pt needs to discuss this with neurologist. She verbalized understanding.  - No recurrent episode of seizure.     3. Severe single current episode of major depressive disorder, without psychotic features (CMS-HCC)  - Pt declined psychiatrist at this time. She report feeling much better with marijuana. She reports that she will find her own therapist if she feels she needs one.   - Not feeling depression today. No more suicidal ideation.       Medication Reconciliation  Medication list at end of encounter:   Current Outpatient Prescriptions   Medication Sig Dispense Refill   • lacosamide (VIMPAT) 50 MG Tab tablet Take 1 Tab by mouth 2 Times a Day. 60 Tab 3   • fluoxetine (PROZAC) 10 MG Cap Take 1 Cap by mouth every day. 30 Cap 3   • ibuprofen (MOTRIN) 200 MG Tab Take 400 mg by mouth every 6 hours as needed for Mild Pain.       No current facility-administered medications for this visit.       Primary care follow-up:  New health conditions identified during hospitalization? Yes   Labs/pathology/imaging requires future PCP follow-up?  No   Changes to medications during hospitalization or today? No Pt needs to discuss about her stop taking vimpat and the use of marijuana.     Recommended followup: No Follow-up on file. with AMADEO Wakefield   Future Appointments       Provider Department Center    8/29/2017 9:00 AM NEURODIAGNOSTIC LAB [facility]     11/10/2017 8:00 AM Lino Anguiano M.D. [facility]           Patient Instruction  Patient offered educational material on discharge diagnosis and management of symptoms/red flags. Patient instructed to keep follow-up appointments and to bring written questions and and actual medications to each office visit. Patient instructed to call PCP/specialist with any problems/questions/concerns. Patient verbalizes understanding and has no further questions at this  time.    Face-to-face transitional care management services with moderate complexity medical decision making.

## 2017-08-16 NOTE — ASSESSMENT & PLAN NOTE
Hx of seizure disorder for 10 years but not having recurrent seizure until about 2 months ago, lamotrgine was switched to keppra which causes her severe depression with suicidal ideation. Psychiatrist eveluation done in the hospital. Increase prozac from 10 mg to 20 mg. VEEG done, two epileptiform discharges record, keppra switched to vimpat. However, pt decided not to take any prescribed medications at this time and she is feeling better with this decision. See depression part for more details.     Neurologist appointment: 11/10; 8/29 will have another VEEG.

## 2017-08-16 NOTE — MR AVS SNAPSHOT
"        Natasha Pino   2017 9:00 AM   Office Visit   MRN: [id number]    Department:  [facility]   Dept Phone:  [phone]    Description:  Female : 1990   Provider:  AMADEO Fenton           Allergies as of 2017     Allergen Noted Reactions    Keppra [Levetiracetam] 2017   Unspecified    Severe Depression     Mushroom Extract Complex 2017   Anaphylaxis    Pt reports her throat swells      You were diagnosed with     Hospital discharge follow-up   [027427]       Seizure (CMS-HCC)   [358684]       Severe single current episode of major depressive disorder, without psychotic features (CMS-HCC)   [3839043]         Vital Signs     Blood Pressure Pulse Temperature Respirations Height Weight    120/84 mmHg 70 36.7 °C (98.1 °F) 16 1.626 m (5' 4\") 78.472 kg (173 lb)    Body Mass Index Oxygen Saturation Last Menstrual Period Smoking Status          29.68 kg/m2 95% 07/15/2017 Never Smoker         Basic Information     Date Of Birth Sex Race Ethnicity Preferred Language    1990 Female White Non- English      Your appointments     Aug 29, 2017  9:00 AM   VIDEO EEG MONITORING with NEURODIAGNOSTIC LAB [facility] (--)    [address], [address]  [place] [address] [phone]           Limit caffeine. Clean, dry hair, no gels, oils, or hairsprays. If testing for seizures or spells, please allow no more than 4 hours of sleep the night before the exam (sleep 12am-4am) Pre-authorization is typically required.            Sep 12, 2017  3:00 PM   Established Patient with AMADEO Wakefield   [facility]    [address]  [place] [address] [phone]           You will be receiving a confirmation call a few days before your appointment from our automated call confirmation system.            Nov 10, 2017  8:00 AM   Follow Up Visit with Lino Anguiano M.D.   [facility] " (--)    [address], [address]  [place] [id number] [phone]           You will be receiving a confirmation call a few days before your appointment from our automated call confirmation system.              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) G40.209   5/5/2017 - Present    Migraine without aura and without status migrainosus, not intractable G43.009   5/5/2017 - Present    Seizure (CMS-[facility]) R56.9   8/2/2017 - Present    Severe single current episode of major depressive disorder (CMS-[facility]) F32.2   8/2/2017 - Present      Health Maintenance        Date Due Completion Dates    IMM DTaP/Tdap/Td Vaccine (1 - Tdap) 6/27/2008 ---    IMM INFLUENZA (1) 9/1/2017 ---    PAP SMEAR 3/8/2020 3/8/2017, 3/1/2016 (Done)    Override on 3/1/2016: Done (NL; GYN=farringer  ABNL with HPV 2011 with LEEP)            Current Immunizations     No immunizations on file.      Below and/or attached are the medications your provider expects you to take. Review all of your home medications and newly ordered medications with your provider and/or pharmacist. Follow medication instructions as directed by your provider and/or pharmacist. Please keep your medication list with you and share with your provider. Update the information when medications are discontinued, doses are changed, or new medications (including over-the-counter products) are added; and carry medication information at all times in the event of emergency situations     Allergies:  KEPPRA - Unspecified     MUSHROOM EXTRACT COMPLEX - Anaphylaxis               Medications  Valid as of: August 16, 2017 - 10:36 AM    Generic Name Brand Name Tablet Size Instructions for use    FLUoxetine HCl (Cap) PROZAC 10 MG Take 1  Cap by mouth every day.        Ibuprofen (Tab) MOTRIN 200 MG Take 400 mg by mouth every 6 hours as needed for Mild Pain.        Lacosamide (Tab) VIMPAT 50 MG Take 1 Tab by mouth 2 Times a Day.        .                 Medicines prescribed today were sent to:     [id number] - [place] [address] - [address] [place] [address]    Phone: [phone] Fax: [phone]    Open 24 Hours?: No      Medication refill instructions:       If your prescription bottle indicates you have medication refills left, it is not necessary to call your provider’s office. Please contact your pharmacy and they will refill your medication.    If your prescription bottle indicates you do not have any refills left, you may request refills at any time through one of the following ways: The online [employer] system (except Urgent Care), by calling your provider’s office, or by asking your pharmacy to contact your provider’s office with a refill request. Medication refills are processed only during regular business hours and may not be available until the next business day. Your provider may request additional information or to have a follow-up visit with you prior to refilling your medication.   *Please Note: Medication refills are assigned a new Rx number when refilled electronically. Your pharmacy may indicate that no refills were authorized even though a new prescription for the same medication is available at the pharmacy. Please request the medicine by name with the pharmacy before contacting your provider for a refill.        Instructions    If you need further assistance, or have any questions; concerns or lingering symptoms before seeing your Primary Care Provider or specialist.     Do not hesitate to contact us.     Please contact us at the Post-Hospital Follow Up Program at [phone].   Our offices hours are Monday-Friday 8 am-5 pm.            [employer] Access Code: Activation code not generated  Current [employer] Status:  Active

## 2017-08-16 NOTE — PATIENT INSTRUCTIONS
If you need further assistance, or have any questions; concerns or lingering symptoms before seeing your Primary Care Provider or specialist.     Do not hesitate to contact us.     Please contact us at the Post-Hospital Follow Up Program at [phone].   Our offices hours are Monday-Friday 8 am-5 pm.

## 2017-08-16 NOTE — PROGRESS NOTES
POST DISCHARGE CALL MADE BY Terra Alvarado  Discharge Date:8/4/2017   Date of Outreach Call: 8/8/2017 10:00 AM  Now that you're home, how are you doing? Good  Comment:Pt states she is doing well s/p hospital  discharge.  Denies any seizures or untoward s/s.  Voices no  complaints.  Do you have questions about your medications? No    Did you fill your medications? Yes  Comment:Pt states she is taking all meds as prescribed.    Do you have a follow-up appointment scheduled?Yes  Comment:Pt will f/u at discharge clinic on 8/10 at 12:40  PM.  Pt states her sister will drive and accompany her to  appt.  Pt is familiar with location of [facility].  Pt is  scheduled for EEG on 8/29.    Discharging Department: Neurosciences    Number of Attempts: 1  Current or previous attempts completed within two business days of discharge? Yes  Provided education regarding treatment plan, medication, self-management, ADLs? No  Comment:Pt states she understands her d/c instructions  and meds and has no questions at this time.  Has patient completed Advance Directive? If yes, advise them to bring to appointment. No  Care Manager phone number provided? Yes  Comment:Maggie at [phone]  Is there anything else I can help you with? No

## 2017-08-16 NOTE — TELEPHONE ENCOUNTER
Outgoing call  1. Caller Name:Lizeth    Call back number: [phone]    2. Message: Neurology appointment for hospital follow up.  Patient was seen by neurologist during her hospital stay from 08/1 thru 08/4.  Left message with Romulo to get a sooner appointment for patient to follow up.      3. Patient approves office to leave a detailed voicemail/MyChart message: N/A

## 2017-08-16 NOTE — ASSESSMENT & PLAN NOTE
Prozac was increased from 10 mg to 20 mg daily but she is not taking it. She decided to go for alternative medicine.   Pt does not feel that she needs to see psychiatrist at this time because she has been feeling better with nature/alternative treatment. She is not taking prozac anymore. She decided to start using medical marijuana which does make her feel better in terms of depression and no episodes of seizure. She denied weakness, no difficulty taking care of self at home but she is unable to work at this time because her 's license is taken away and needs to be re-evaluated by neurologist. In addition, she is told that light exposure could be her trigger for seizure so that she has to avoid sitting in front of computer for too long, need to limit to 4-5 hours per day.

## 2017-08-17 NOTE — TELEPHONE ENCOUNTER
I have no objection if Olinda can get this patient in sooner rather than later. I have not heard of severe suicidal ideation occurring with Vimpat, Olinda may have. If so, then obviously a different antiepileptic does need to be started.

## 2017-08-17 NOTE — TELEPHONE ENCOUNTER
Romulo from Neurology called back to inform there is a cancellation on 08/18/17 at 3:40 with Dr Anguiano and patient has been scheduled.  Left generic message for patient to contact office at [phone].

## 2017-08-17 NOTE — TELEPHONE ENCOUNTER
----- Message from Romulo Salmeron, Med Ass't sent at 8/16/2017  8:50 AM PDT -----  Regarding: APPT  This patient is currently on vimpat 50mg BID and was admitted to the hospital for depression and suicidal ideations. I received a call from Lizeth and MA with discharge, and she is wondering if we can get this patient in for a sooner appointment. She is scheduled to see Dr. Anguiano in November, but they would like to have the patient seen sooner so she can have her medication adjusted and doesn't end up back in the hospital.    Stephanie- are you okay to see this patient at a sooner date?    Please advise.

## 2017-08-18 NOTE — TELEPHONE ENCOUNTER
Patient called back and was informed on message below regarding Neurologist appoitment.  Outcome: Patient stated she spoke to neurologist's  and the appointment was cancelled due to provider is out of the office today, however she was placed on cancellation list to reschedule appointment as soon as possible.

## 2017-08-24 ENCOUNTER — TELEPHONE (OUTPATIENT)
Dept: NEUROLOGY | Facility: MEDICAL CENTER | Age: 27
End: 2017-08-24

## 2017-08-24 NOTE — TELEPHONE ENCOUNTER
Received a call from the patient for Dr. Anguiano. She needs clearance to go back to work but does not have an appointment with Dr. Anguiano till November. She is wondering if she can get an okay to go back to work, even if it is only part-time or with restrictions? Her job will not give her unemployment because she was not fired, but they will also not approve short-term disability because this is a long-term medical issue. They also won't be able to get her long-term disability till February 2018. She does need to work but does not know what to do at this point since she isn't currently cleared to go back to work. She is wondering if she can be cleared or if she needs to start looking for another job?    Patient is scheduled for a VEEG next Tuesday 8/29/17 and is hoping this could tell the doctors more information?    Please advise.

## 2017-08-24 NOTE — TELEPHONE ENCOUNTER
My understanding is that Olinda was willing to see this patient before her appointment with me in November. I also reviewed the electronic health record, she was hospitalized for her depression and suicidal thoughts earlier this month, she then followed up with her PCP in the clinic and indicated she did not want to take medication both for the depression or for her seizures, she was using alternative therapies and medical marijuana. If this is still the case, I cannot in good conscience continue to treat her if she does not feel comfortable following through with my recommendations. Her EEG studies did show she has an underlying epileptiform disorder, she does warrant appropriate medications, and even though she has had a rough time tolerating previous trials of Lamictal and Keppra, there are other medications that can be used; medical marijuana is not indicated in its use for epilepsy in adults. Unless she is willing to follow through with my recommendations, she will need to find another caretaker. As a result, I'm also not in a position to disable her when it comes to her work circumstance. I'm willing to discuss the case with Olinda in case the patient does follow-up with her.

## 2017-08-25 NOTE — TELEPHONE ENCOUNTER
Patient informed.  Appointment scheduled with Stephanie Israel on 09/22/17 at 8:20 am and placed on cancellation list.

## 2017-08-29 ENCOUNTER — NON-PROVIDER VISIT (OUTPATIENT)
Dept: NEUROLOGY | Facility: MEDICAL CENTER | Age: 27
End: 2017-08-29
Payer: COMMERCIAL

## 2017-08-29 DIAGNOSIS — G40.209 LOCALIZATION-RELATED PARTIAL EPILEPSY WITH COMPLEX PARTIAL SEIZURES (HCC): ICD-10-CM

## 2017-08-29 PROCEDURE — 95951 PR EEG MONITORING/VIDEORECORD: CPT | Mod: 52 | Performed by: PSYCHIATRY & NEUROLOGY

## 2017-09-01 NOTE — PROCEDURES
DATE OF SERVICE:  08/29/2017    HISTORY OF PRESENT ILLNESS:  Patient is a 27-year-old female with a history of   possible seizures, initial EEG study for 48 hours has indicated 2   epileptiform discharges over the right hemisphere along with a nonepileptic   event documented on video.  Followup study is now being requested for   comparison.    CONDITION OF RECORDING:  This is a 21-channel, outpatient, portable, digital   and video EEG tracing.  Bipolar and referential montages are used.    Hyperventilation and photic stimulation both are done.  The patient is awake   throughout the tracing.  She is presently on no medications.    TRACING DESCRIPTION:  As the tracing begins, when the patient is awake, alert,   and with her eyes closed, a symmetric alpha frequency is seen without   focality or paroxysmal activity.  The patient does become drowsy and actually   she does fall asleep, and it is during these intervals that 2 isolated   generalized sharp and slow wave discharges are seen, they appear to start over   the left hemisphere, but rapidly spread bilaterally.  There is no change in   the patient's behavior.  The background resumes normally with only minimal   suppression.    Hyperventilation revealed buildup bilaterally, no activation occurred.  Photic   stimulation did reveal driving response, again, no activation occurred.    IMPRESSION:  This is an abnormal EEG for a patient of this age consistent with   bilateral hemispheric dysfunction, right greater than left.  During sleep, 2   clear paroxysmal, epileptiform discharges are seen with a right hemispheric   localization parasagittally with rapid secondary spread.  Clinical correlation   is suggested.       ____________________________________     MD ANJELICA JEWELL / MEÑO    DD:  08/31/2017 19:37:56  DT:  08/31/2017 20:20:07    D#:  [id number]  Job#:  [id number]

## 2017-09-06 ENCOUNTER — TELEPHONE (OUTPATIENT)
Dept: NEUROLOGY | Facility: MEDICAL CENTER | Age: 27
End: 2017-09-06

## 2017-09-06 NOTE — TELEPHONE ENCOUNTER
"----- Message from Romulo Salmeron, Med Ass't sent at 9/6/2017 10:21 AM PDT -----  Regarding: FW: Test Result Question  Contact: [phone]  Just spoke to this patient and Stephanie as well. The patient would like to be released to go back to work but needs a letter from our office. 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 She is in a tough financial spot and needs to go back to work. Her work is okay with her going back on Monday assuming she has clearance from you. Are you able to look at her EEG to see if this is okay? I can type up a letter if you're okay with this. She is still scheduled to see Stephanie on 9/22/17.  Stephanie is more comfortable if you make the call if she is okay to go back to work judging from her EEG as she did take a look at it. Patient was induced to have a seizure back during a hospital stay in early August. FYI. She also states she is currently not on any anticonvulsants as she wishes to try a more holistic approach? Please advise.   ----- Message -----  From: Liliana Herrera Med Ass't  Sent: 9/5/2017   1:33 PM  To: Romulo Salmeron Med Ass't  Subject: FW: Test Result Question                             ----- Message -----  From: Natasha Pino  Sent: 9/5/2017  11:45 AM  To: Neurology [facility]  Subject: Test Result Question                             I had my eeg last week and have yet to get my results. I called Friday morning and earlier this morning incase my message was not received. I am looking to get released back to work on Monday September 11, 2017.     Also, I would like to request a release to drive. I had my license revoked while in the hospital due to a seizure that was induced because they ordered the stop of ALL anticonvulsants. I feel my license was wrongfully revoked.    "

## 2017-09-06 NOTE — TELEPHONE ENCOUNTER
"Please refer to my note from August 24, 2017. In that note, the issue of her seizure disorder, her refusal to take prescribed medications for epilepsy, rather pursuing an alternative and more holistic approach to treating them, was mentioned. My impression and recommendations are still the same. Specifically, she has epilepsy, she was told that when she was discharged. She will require medication, she was placed on Vimpat when discharged. I doubt that this drug caused this subsequent, severe \"depressive\" symptom complex with suicidal thoughts, etc. I talked about this with TRINI Mayfield, who also has never heard of this occurring in patients on Vimpat. If the patient refuses to take prescribed medications for her condition, she is entitled to do so, but in this circumstance, I can not be involved in her care, thus I will not be able to discharge her back to work. She will need to follow-up with another health care provider who is more comfortable treating epilepsy with homeopathic modalities, and it is that individual who can release her to go back to work. The same restrictions also applied to her licensure reinstatement.  "

## 2017-09-22 ENCOUNTER — OFFICE VISIT (OUTPATIENT)
Dept: NEUROLOGY | Facility: MEDICAL CENTER | Age: 27
End: 2017-09-22
Payer: COMMERCIAL

## 2017-09-22 VITALS
SYSTOLIC BLOOD PRESSURE: 122 MMHG | BODY MASS INDEX: 29.45 KG/M2 | TEMPERATURE: 99.2 F | RESPIRATION RATE: 16 BRPM | DIASTOLIC BLOOD PRESSURE: 80 MMHG | OXYGEN SATURATION: 95 % | WEIGHT: 172.5 LBS | HEIGHT: 64 IN | HEART RATE: 79 BPM

## 2017-09-22 DIAGNOSIS — G40.209 LOCALIZATION-RELATED PARTIAL EPILEPSY WITH COMPLEX PARTIAL SEIZURES (HCC): ICD-10-CM

## 2017-09-22 PROCEDURE — 99214 OFFICE O/P EST MOD 30 MIN: CPT | Performed by: NURSE PRACTITIONER

## 2017-09-22 RX ORDER — LACOSAMIDE 200 MG/1
200 TABLET ORAL 2 TIMES DAILY
Qty: 60 TAB | Refills: 5 | Status: SHIPPED | OUTPATIENT
Start: 2017-09-22 | End: 2019-08-26

## 2017-09-22 ASSESSMENT — ENCOUNTER SYMPTOMS
DOUBLE VISION: 0
SEIZURES: 1
SORE THROAT: 0
HEADACHES: 0
MUSCULOSKELETAL NEGATIVE: 1
VOMITING: 0
DIZZINESS: 0
NERVOUS/ANXIOUS: 0
CONSTITUTIONAL NEGATIVE: 1
COUGH: 0
ABDOMINAL PAIN: 0
DIARRHEA: 0
INSOMNIA: 1
NAUSEA: 1
DEPRESSION: 1

## 2017-09-22 ASSESSMENT — PATIENT HEALTH QUESTIONNAIRE - PHQ9: CLINICAL INTERPRETATION OF PHQ2 SCORE: 0
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
## 2017-09-22 NOTE — PATIENT INSTRUCTIONS
Vimpat increase:    AM        PM  50mg -- 100mg X 5 days  100mg- 100mg X 5 days  100mg- 150mg X 5 days  150mg- 150mg X 5 days  150mg- 200mg X 5 day  200mg- 200mg until next appointment    Then have labs draw about one week before your next appointment.

## 2017-09-22 NOTE — LETTER
September 22, 2017       Patient: Natasha Pino   YOB: 1990   Date of Visit: 9/22/2017         To Whom It May Concern:    It is my medical opinion that Natasha Pino return to full duty, no restrictions.     If you have any questions or concerns, please don't hesitate to call [phone]          Sincerely,          SASHA Nam.  Electronically Signed

## 2017-09-25 ENCOUNTER — OFFICE VISIT (OUTPATIENT)
Dept: URGENT CARE | Facility: PHYSICIAN GROUP | Age: 27
End: 2017-09-25
Payer: COMMERCIAL

## 2017-09-25 VITALS
TEMPERATURE: 101.5 F | OXYGEN SATURATION: 98 % | WEIGHT: 172 LBS | HEIGHT: 64 IN | SYSTOLIC BLOOD PRESSURE: 124 MMHG | DIASTOLIC BLOOD PRESSURE: 80 MMHG | BODY MASS INDEX: 29.37 KG/M2 | HEART RATE: 100 BPM

## 2017-09-25 DIAGNOSIS — J02.0 STREP THROAT: Primary | ICD-10-CM

## 2017-09-25 DIAGNOSIS — J02.9 EXUDATIVE PHARYNGITIS: ICD-10-CM

## 2017-09-25 DIAGNOSIS — H66.003 ACUTE SUPPURATIVE OTITIS MEDIA OF BOTH EARS WITHOUT SPONTANEOUS RUPTURE OF TYMPANIC MEMBRANES, RECURRENCE NOT SPECIFIED: ICD-10-CM

## 2017-09-25 LAB
FLUAV+FLUBV AG SPEC QL IA: NORMAL
INT CON NEG: NEGATIVE
INT CON NEG: NEGATIVE
INT CON POS: POSITIVE
INT CON POS: POSITIVE
S PYO AG THROAT QL: POSITIVE

## 2017-09-25 PROCEDURE — 87804 INFLUENZA ASSAY W/OPTIC: CPT | Performed by: PHYSICIAN ASSISTANT

## 2017-09-25 PROCEDURE — 87880 STREP A ASSAY W/OPTIC: CPT | Performed by: PHYSICIAN ASSISTANT

## 2017-09-25 PROCEDURE — 99214 OFFICE O/P EST MOD 30 MIN: CPT | Performed by: PHYSICIAN ASSISTANT

## 2017-09-25 RX ORDER — AMOXICILLIN AND CLAVULANATE POTASSIUM 875; 125 MG/1; MG/1
1 TABLET, FILM COATED ORAL 2 TIMES DAILY
Qty: 20 TAB | Refills: 0 | Status: SHIPPED | OUTPATIENT
Start: 2017-09-25 | End: 2017-10-05

## 2017-09-26 NOTE — PATIENT INSTRUCTIONS
"Strep Throat  Strep throat is an infection of the throat caused by a bacteria named Streptococcus pyogenes. Your health care provider may call the infection streptococcal \"tonsillitis\" or \"pharyngitis\" depending on whether there are signs of inflammation in the tonsils or back of the throat. Strep throat is most common in children aged 5-15 years during the cold months of the year, but it can occur in people of any age during any season. This infection is spread from person to person (contagious) through coughing, sneezing, or other close contact.  SIGNS AND SYMPTOMS   · Fever or chills.  · Painful, swollen, red tonsils or throat.  · Pain or difficulty when swallowing.  · White or yellow spots on the tonsils or throat.  · Swollen, tender lymph nodes or \"glands\" of the neck or under the jaw.  · Red rash all over the body (rare).  DIAGNOSIS   Many different infections can cause the same symptoms. A test must be done to confirm the diagnosis so the right treatment can be given. A \"rapid strep test\" can help your health care provider make the diagnosis in a few minutes. If this test is not available, a light swab of the infected area can be used for a throat culture test. If a throat culture test is done, results are usually available in a day or two.  TREATMENT   Strep throat is treated with antibiotic medicine.  HOME CARE INSTRUCTIONS   · Gargle with 1 tsp of salt in 1 cup of warm water, 3-4 times per day or as needed for comfort.  · Family members who also have a sore throat or fever should be tested for strep throat and treated with antibiotics if they have the strep infection.  · Make sure everyone in your household washes their hands well.  · Do not share food, drinking cups, or personal items that could cause the infection to spread to others.  · You may need to eat a soft food diet until your sore throat gets better.  · Drink enough water and fluids to keep your urine clear or pale yellow. This will help prevent " dehydration.  · Get plenty of rest.  · Stay home from school, day care, or work until you have been on antibiotics for 24 hours.  · Take medicines only as directed by your health care provider.  · Take your antibiotic medicine as directed by your health care provider. Finish it even if you start to feel better.  SEEK MEDICAL CARE IF:   · The glands in your neck continue to enlarge.  · You develop a rash, cough, or earache.  · You cough up green, yellow-brown, or bloody sputum.  · You have pain or discomfort not controlled by medicines.  · Your problems seem to be getting worse rather than better.  · You have a fever.  SEEK IMMEDIATE MEDICAL CARE IF:   · You develop any new symptoms such as vomiting, severe headache, stiff or painful neck, chest pain, shortness of breath, or trouble swallowing.  · You develop severe throat pain, drooling, or changes in your voice.  · You develop swelling of the neck, or the skin on the neck becomes red and tender.  · You develop signs of dehydration, such as fatigue, dry mouth, and decreased urination.  · You become increasingly sleepy, or you cannot wake up completely.  MAKE SURE YOU:  · Understand these instructions.  · Will watch your condition.  · Will get help right away if you are not doing well or get worse.     This information is not intended to replace advice given to you by your health care provider. Make sure you discuss any questions you have with your health care provider.     Document Released: 12/15/2001 Document Revised: 01/08/2016 Document Reviewed: 04/11/2016  [employer] Interactive Patient Education ©2016 [employer]

## 2017-09-26 NOTE — PROGRESS NOTES
Subjective:      Pt is a 27 y.o. female who presents with Otalgia (Bilateral ear pain, fever, chills, body aches x 3 days )            HPI  PT presents to  clinic today complaining of sore throat, fevers, chills, watery eyes, pressure in ears, cough, fatigue, runny nose. PT denies CP, SOB, NVD, abdominal pain, joint pain. PT states these symptoms began around 3 days ago and that the pt's family has been sick on and off for the last week. PT states the pain is a 7/10 with swallowing, aching in nature and worse at night.  Pt has not taken any medications for this condition. The pt's medication list, problem list, and allergies have been evaluated and reviewed during today's visit.      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   • Kidney disease     kidney stones       PSH:  Past Surgical History:   Procedure Laterality Date   • LEEP     • APPENDECTOMY         Fam Hx:    family history includes Cancer in her mother; Diabetes in her maternal grandmother; GI in her sister; Heart Attack in her father; Hyperlipidemia in her father; Seizures in her sister.  Family Status   Relation Status   • Mother Alive   • Father Alive   • Sister Alive   • Brother Alive   • Maternal Grandmother    • Maternal Grandfather Alive   • Paternal Grandmother    • Paternal Grandfather Alive   • Sister Alive       Soc HX:  Social History     Social History   • Marital status: Single     Spouse name: N/A   • Number of children: N/A   • Years of education: N/A     Occupational History   • Not on file.     Social History Main Topics   • Smoking status: Never Smoker   • Smokeless tobacco: Never Used   • Alcohol use 0.0 oz/week      Comment: Socially   • Drug use:      Types: Marijuana   • Sexual activity: Yes     Partners:  "Male     Birth control/ protection: Pill     Other Topics Concern   • Not on file     Social History Narrative   • No narrative on file         Medications:    Current Outpatient Prescriptions:   •  Phenylephrine-DM-GG-APAP (TYLENOL COLD/FLU SEVERE PO), Take  by mouth., Disp: , Rfl:   •  amoxicillin-clavulanate (AUGMENTIN) 875-125 MG Tab, Take 1 Tab by mouth 2 times a day for 10 days., Disp: 20 Tab, Rfl: 0  •  Alum & Mag Hydroxide-Simeth (MAALOX PLUS-BENADRYL-XYLOCAINE), Take 15 mL by mouth every 6 hours as needed for up to 5 days., Disp: 300 mL, Rfl: 0  •  lacosamide (VIMPAT) 200 MG Tab tablet, Take 200 mg by mouth 2 Times a Day., Disp: 60 Tab, Rfl: 5  •  lacosamide (VIMPAT) 50 MG Tab tablet, Take 1 Tab by mouth 2 Times a Day., Disp: 60 Tab, Rfl: 3  •  fluoxetine (PROZAC) 10 MG Cap, Take 1 Cap by mouth every day., Disp: 30 Cap, Rfl: 3  •  ibuprofen (MOTRIN) 200 MG Tab, Take 400 mg by mouth every 6 hours as needed for Mild Pain., Disp: , Rfl:       Allergies:  Keppra [levetiracetam] and Mushroom extract complex    ROS  Constitutional: Positive for malaise/fatigue.   HENT: Positive for congestion and sore throat. POS for B/L  ear pain.    Eyes: Negative for blurred vision, double vision and photophobia.   Respiratory: NEG for cough  Cardiovascular: Negative for chest pain and palpitations.   Gastrointestinal: Negative for nausea, vomiting, abdominal pain, diarrhea and constipation.   Genitourinary: Negative for dysuria and flank pain.   Musculoskeletal: Negative for falls and myalgias.   Skin: Negative for itching and rash.   Neurological: Positive for headaches. Negative for dizziness and tingling.   Endo/Heme/Allergies: Does not bruise/bleed easily.   Psychiatric/Behavioral: Negative for depression. The patient is not nervous/anxious.             Objective:     /80   Pulse 100   Temp (!) 38.6 °C (101.5 °F)   Ht 1.626 m (5' 4\")   Wt 78 kg (172 lb)   SpO2 98%   BMI 29.52 kg/m²      Physical Exam     "   Constitutional: PT is oriented to person, place, and time. PT appears well-developed and well-nourished. No distress.   HENT:   Head: Normocephalic and atraumatic.   Right Ear: Hearing, external ear and ear canal normal. Tympanic membrane is erythematous and bulging. A middle ear effusion is present.   Left Ear: Hearing, external ear and ear canal normal. Tympanic membrane is erythematous and bulging. A middle ear effusion is present.   Nose: Mucosal edema, rhinorrhea and sinus tenderness present. Right sinus exhibits frontal sinus tenderness. Left sinus exhibits frontal sinus tenderness.   Mouth/Throat: Uvula is midline. Mucous membranes are pale. Posterior oropharyngeal edema and posterior oropharyngeal erythema with exudate noted on exam.   Eyes: Conjunctivae normal and EOM are normal. Pupils are equal, round, and reactive to light.   Neck: Normal range of motion. Neck supple. No thyromegaly present.   Cardiovascular: Normal rate, regular rhythm, normal heart sounds and intact distal pulses.  Exam reveals no gallop and no friction rub.    No murmur heard.  Pulmonary/Chest: Effort normal and breath sounds normal. No respiratory distress. PT has no wheezes. PT has no rales. PT exhibits no tenderness.   Abdominal: Soft. Bowel sounds are normal. PT exhibits no distension and no mass. There is no tenderness. There is no rebound and no guarding.   Musculoskeletal: Normal range of motion. PT exhibits no edema and no tenderness.   Lymphadenopathy:     PT has no cervical adenopathy.   Neurological: PT is alert and oriented to person, place, and time. PT displays normal reflexes. No cranial nerve deficit. PT exhibits normal muscle tone. Coordination normal.   Skin: Skin is warm and dry. No rash noted. No erythema.   Psychiatric: PT has a normal mood and affect. PT behavior is normal. Judgment and thought content normal.        Assessment/Plan:     1. Strep throat    - amoxicillin-clavulanate (AUGMENTIN) 875-125 MG Tab;  Take 1 Tab by mouth 2 times a day for 10 days.  Dispense: 20 Tab; Refill: 0  - Alum & Mag Hydroxide-Simeth (MAALOX PLUS-BENADRYL-XYLOCAINE); Take 15 mL by mouth every 6 hours as needed for up to 5 days.  Dispense: 300 mL; Refill: 0    2. Acute suppurative otitis media of both ears without spontaneous rupture of tympanic membranes, recurrence not specified    - amoxicillin-clavulanate (AUGMENTIN) 875-125 MG Tab; Take 1 Tab by mouth 2 times a day for 10 days.  Dispense: 20 Tab; Refill: 0    3. Exudative pharyngitis    - POCT Rapid Strep A-->POS  - POCT Influenza A/B-->NEG    Rest, fluids encouraged.  OTC decongestant for congestion/cough  Note given for work.  AVS with medical info given.  Pt was in full understanding and agreement with the plan.  Follow-up as needed if symptoms worsen or fail to improve.

## 2017-10-02 ENCOUNTER — APPOINTMENT (OUTPATIENT)
Dept: SOCIAL WORK | Facility: CLINIC | Age: 27
End: 2017-10-02
Payer: COMMERCIAL

## 2017-10-02 PROCEDURE — 90686 IIV4 VACC NO PRSV 0.5 ML IM: CPT | Performed by: REGISTERED NURSE

## 2017-10-02 PROCEDURE — 90471 IMMUNIZATION ADMIN: CPT | Performed by: REGISTERED NURSE

## 2017-10-09 ENCOUNTER — TELEPHONE (OUTPATIENT)
Dept: MEDICAL GROUP | Facility: CLINIC | Age: 27
End: 2017-10-09

## 2017-10-24 ENCOUNTER — TELEPHONE (OUTPATIENT)
Dept: NEUROLOGY | Facility: MEDICAL CENTER | Age: 27
End: 2017-10-24

## 2017-10-24 NOTE — TELEPHONE ENCOUNTER
Patient phoned today, she wanted to know if it would be possible to get a partial script of the Vimpat called into her pharmacy as she had accidentally knocked her vimpat down the drain in her bathroom while getting ready the other day. Spoke to patient's pharmacy and they stated that she would be able to  her medications again on November 2nd to have the insurance pay for it. Got patient samples to cover her until this time as cost for a partial script was through the roof.

## 2017-11-02 ENCOUNTER — HOSPITAL ENCOUNTER (OUTPATIENT)
Dept: LAB | Facility: MEDICAL CENTER | Age: 27
End: 2017-11-02
Attending: NURSE PRACTITIONER
Payer: COMMERCIAL

## 2017-11-02 DIAGNOSIS — G40.209 LOCALIZATION-RELATED PARTIAL EPILEPSY WITH COMPLEX PARTIAL SEIZURES (HCC): ICD-10-CM

## 2017-11-02 LAB — 25(OH)D3 SERPL-MCNC: 16 NG/ML (ref 30–100)

## 2017-11-02 PROCEDURE — 82306 VITAMIN D 25 HYDROXY: CPT

## 2017-11-02 PROCEDURE — 36415 COLL VENOUS BLD VENIPUNCTURE: CPT

## 2017-11-02 PROCEDURE — 80339 ANTIEPILEPTICS NOS 1-3: CPT

## 2017-11-03 ENCOUNTER — TELEPHONE (OUTPATIENT)
Dept: NEUROLOGY | Facility: MEDICAL CENTER | Age: 27
End: 2017-11-03

## 2017-11-04 LAB — TEST NAME 95000: ABNORMAL

## 2017-11-06 ENCOUNTER — OFFICE VISIT (OUTPATIENT)
Dept: NEUROLOGY | Facility: MEDICAL CENTER | Age: 27
End: 2017-11-06
Payer: COMMERCIAL

## 2017-11-06 VITALS
TEMPERATURE: 99.7 F | HEIGHT: 64 IN | SYSTOLIC BLOOD PRESSURE: 130 MMHG | BODY MASS INDEX: 30.59 KG/M2 | WEIGHT: 179.2 LBS | HEART RATE: 81 BPM | DIASTOLIC BLOOD PRESSURE: 92 MMHG | OXYGEN SATURATION: 97 %

## 2017-11-06 DIAGNOSIS — F32.2 SEVERE SINGLE CURRENT EPISODE OF MAJOR DEPRESSIVE DISORDER, WITHOUT PSYCHOTIC FEATURES (HCC): ICD-10-CM

## 2017-11-06 DIAGNOSIS — G40.209 LOCALIZATION-RELATED PARTIAL EPILEPSY WITH COMPLEX PARTIAL SEIZURES (HCC): ICD-10-CM

## 2017-11-06 PROCEDURE — 99214 OFFICE O/P EST MOD 30 MIN: CPT | Performed by: NURSE PRACTITIONER

## 2017-11-06 RX ORDER — DIPHENHYDRAMINE HYDROCHLORIDE AND LIDOCAINE HYDROCHLORIDE AND ALUMINUM HYDROXIDE AND MAGNESIUM HYDRO
KIT
Refills: 0 | COMMUNITY
Start: 2017-09-25 | End: 2017-11-06

## 2017-11-06 RX ORDER — DROSPIRENONE AND ETHINYL ESTRADIOL 0.03MG-3MG
KIT ORAL
COMMUNITY
Start: 2017-10-29 | End: 2019-08-26

## 2017-11-06 ASSESSMENT — PATIENT HEALTH QUESTIONNAIRE - PHQ9: CLINICAL INTERPRETATION OF PHQ2 SCORE: 0

## 2017-11-06 NOTE — PROGRESS NOTES
Subjective:      Natasha Pino is a 27 y.o. female who presents with Follow-Up (seizure clearance to return to work)        HPI  Increased the Vimpat to 200mg BID.  Felt a bit foggy headed with the rapid titration but is feeling much improved now.    Does not recognize any seizures or lapses in her cognition.  She is feeling fantastic.  Her temperament is greatly improved and she appears alert.    Has been working.  Provided a new screen for her computer.  Now in a new/different department.  She is doing very well now back to work.    Needs her DMV form to clear to drive.  EEG unable to be performed until January as scheduled.    Current Outpatient Prescriptions   Medication Sig Dispense Refill   • drospirenone-ethinyl estradiol (PB) 3-0.03 MG per tablet      • lacosamide (VIMPAT) 200 MG Tab tablet Take 200 mg by mouth 2 Times a Day. 60 Tab 5   • fluoxetine (PROZAC) 10 MG Cap Take 1 Cap by mouth every day. 30 Cap 3   • ibuprofen (MOTRIN) 200 MG Tab Take 400 mg by mouth every 6 hours as needed for Mild Pain.     • DPH-Lido-AlHydr-MgHydr-Simeth (MAGIC MOUTHWASH BLM) Suspension TAKE 15 ML BY MOUTH EVERY 6 HOURS AS NEEDED FOR UP TO 5 DAYS  0   • Phenylephrine-DM-GG-APAP (TYLENOL COLD/FLU SEVERE PO) Take  by mouth.     • lacosamide (VIMPAT) 50 MG Tab tablet Take 1 Tab by mouth 2 Times a Day. 60 Tab 3     No current facility-administered medications for this visit.        Review of Systems   Constitutional: Negative.    HENT: Negative for hearing loss, nosebleeds and sore throat.         No recent head injury.   Eyes: Negative for double vision.        No new loss of vision.   Respiratory: Negative for cough.         No recent lung infections.   Cardiovascular: Negative for chest pain.   Gastrointestinal: Negative for abdominal pain, diarrhea, nausea and vomiting.   Genitourinary: Negative.    Musculoskeletal: Negative.    Skin: Negative.    Neurological: Negative for seizures and headaches.  "  Endo/Heme/Allergies:        No history of endocrine dysfunction.  No new problems.   Psychiatric/Behavioral: Negative for depression and suicidal ideas. The patient is not nervous/anxious.         No recent mood changes.          Objective:     /92   Pulse 81   Temp 37.6 °C (99.7 °F)   Ht 1.626 m (5' 4.02\")   Wt 81.3 kg (179 lb 3.2 oz)   SpO2 97%   BMI 30.74 kg/m²      Physical Exam   Constitutional: She is oriented to person, place, and time. She appears well-developed and well-nourished.   HENT:   Head: Normocephalic and atraumatic.   Eyes: EOM are normal.   Neck: Normal range of motion.   Cardiovascular: Normal rate and regular rhythm.    Pulmonary/Chest: Effort normal.   Neurological: She is alert and oriented to person, place, and time. She exhibits normal muscle tone. Gait normal.   No observable changes in neurologic status.  See initial new patient examination for details.    Skin: Skin is warm.   Psychiatric: She has a normal mood and affect.               Assessment/Plan:     Localization-related epilepsy with generalization:  Transfer of care from Dr Anguiano.  Significant issues regarding medication intolerance and mood instability.     Much improved with Vimpat 200mg BID.    Continue Prozac 10mg qam.     Reviewed her course and EEG's at length.  Brain MRI is normal.     Letter given to clear for work.     DMV form completed.    EEG is scheduled in January 2018.    Recent Vimpat level of 12.9mcg/ml.    Return in January 2018 as scheduled to review plan of care and EEG results with Dr Amos.     I spent 35 minutes with this patient, over fifty percent was spent counseling patient on their condition, best management practices, reviewing test results and risks and benefits of treatment.       EDUCATION AND COUNSELING:  -Education was provided to the patient and/or family regarding diagnosis and prognosis. The chronic and unpredictable nature of the condition was discussed. There is increased " risk for additional events, which may carry potential for significant injuries and death.    -We reviewed the current antiepileptic regimen. Potential side effects of antiepileptics were discussed at length, including but no limited to: hypersensitivity reactions (rash and others, some of which can be fatal), visual field changes (some of which may be irreversible), glaucoma, diplopia, kidney stones, osteopenia/osteoporosis/bone fractures, hyperthermia/anhydrosis, tremors, ataxia, dizziness, fatigue, increased risk for falls, cardiac arrhythmias/syncope, gastrointestinal (hepatitis, pancreatitis, gastritis, ulcers), gingival hypertrophy, drowsiness, sedation, anxiety/nervousness, increased risk for suicide, increased risk for depression, and psychosis. We reviewed drug-drug interactions and their potential effect on seizure control and medication side effects.    -Patient/family educated on SUDEP (Sudden Death in Epilepsy). Counseling was provided on the importance of strict medication and follow up compliance. The patient understands the risks associated with non-adherence with the medical plan as outlined, including but not limited to an increased risk for breakthrough seizures, which may contribute to injuries, disability, status epilepticus, and even death.    -Counseling was also provided on potential effects of alcohol and other drugs, which may lower seizure threshold and/or affect the metabolism of antiepileptic drugs. I recommend avoidance of alcohol and illegal drugs.  -Recommend proper hydration, regular exercise, proper sleep hygiene (7-8 hrs of overnight sleep, avoid sleep deprivation).    -I have made the patient aware of mandatory reporting required by the law in the State [facility] regarding episodes of seizures, loss of consciousness, and/or alteration of awareness. The patient and family are responsible for reporting events to the DMV, instructions were provided. The patient verbalized  understanding and states he has not been driving. Other seizure precautions were discussed at length, including no diving, no skydiving, no unsupervised swimming, no Jacuzzi or bathing in bathtubs.    -She is ok to  work but cannot operate any heavy machinery, he verbalized understanding.     Pt agrees with plan.

## 2017-11-07 ASSESSMENT — ENCOUNTER SYMPTOMS
DEPRESSION: 0
COUGH: 0
DOUBLE VISION: 0
NERVOUS/ANXIOUS: 0
MUSCULOSKELETAL NEGATIVE: 1
NAUSEA: 0
SORE THROAT: 0
DIARRHEA: 0
HEADACHES: 0
ABDOMINAL PAIN: 0
CONSTITUTIONAL NEGATIVE: 1
SEIZURES: 0
VOMITING: 0

## 2017-11-28 ENCOUNTER — OFFICE VISIT (OUTPATIENT)
Dept: MEDICAL GROUP | Facility: CLINIC | Age: 27
End: 2017-11-28
Payer: COMMERCIAL

## 2017-11-28 VITALS
HEIGHT: 64 IN | TEMPERATURE: 98.6 F | BODY MASS INDEX: 30.22 KG/M2 | OXYGEN SATURATION: 100 % | WEIGHT: 177 LBS | HEART RATE: 80 BPM | DIASTOLIC BLOOD PRESSURE: 70 MMHG | RESPIRATION RATE: 16 BRPM | SYSTOLIC BLOOD PRESSURE: 126 MMHG

## 2017-11-28 DIAGNOSIS — N63.0 BREAST LUMP IN FEMALE: ICD-10-CM

## 2017-11-28 PROCEDURE — 99212 OFFICE O/P EST SF 10 MIN: CPT | Performed by: NURSE PRACTITIONER

## 2017-11-28 ASSESSMENT — ENCOUNTER SYMPTOMS
WEIGHT LOSS: 0
CHILLS: 0
FEVER: 0

## 2017-11-28 NOTE — PROGRESS NOTES
Chief Complaint   Patient presents with   • Breast Mass     L breat painful breast lump x 1 month        HISTORY OF PRESENT ILLNESS: Patient is a 27 y.o. female established patient who presents today due to left breast lump noted two weeks ago after tenderness for about a month. This period cycle stop about 7 days ago. Denied nipple discharge or any dent noted. No body weight loss. Positive family hx of breast cancer in grandmother and cervical/ovarian cancer in mother. She is currently taking birth control pill drospirenone-ethinyl estradiol.       Patient Active Problem List    Diagnosis Date Noted   • Seizure (CMS-HCC) 2017   • Severe single current episode of major depressive disorder (CMS-HCC) 2017   • Localization-related partial epilepsy with complex partial seizures (CMS-HCC) 2017   • Migraine without aura and without status migrainosus, not intractable 2017   • Obesity (BMI 30-39.9) 2017   • Uses birth control-OCPs 2017   • Syncope and collapse 2017       Allergies:Keppra [levetiracetam] and Mushroom extract complex    Current Outpatient Prescriptions   Medication Sig Dispense Refill   • drospirenone-ethinyl estradiol (PB) 3-0.03 MG per tablet      • lacosamide (VIMPAT) 200 MG Tab tablet Take 200 mg by mouth 2 Times a Day. 60 Tab 5   • ibuprofen (MOTRIN) 200 MG Tab Take 400 mg by mouth every 6 hours as needed for Mild Pain.     • fluoxetine (PROZAC) 10 MG Cap Take 1 Cap by mouth every day. 30 Cap 3     No current facility-administered medications for this visit.        Social History   Substance Use Topics   • Smoking status: Never Smoker   • Smokeless tobacco: Never Used   • Alcohol use 0.0 oz/week      Comment: Socially       Family Status   Relation Status   • Mother Alive   • Father Alive   • Sister Alive   • Brother Alive   • Maternal Grandmother    • Maternal Grandfather Alive   • Paternal Grandmother    • Paternal Grandfather Alive   •  "Sister Alive     Family History   Problem Relation Age of Onset   • Cancer Mother      ovarian CA  and cervica CA (n her mid 30s)   • Hyperlipidemia Father    • Heart Attack Father      twice   • Seizures Sister    • GI Sister      \"intestinal problem\"   • Diabetes Maternal Grandmother          ROS:  Review of Systems   Constitutional: Positive for malaise/fatigue. Negative for chills, fever and weight loss.   Skin: Negative for itching and rash.        Breast lump at left side        Objective:     Blood pressure 126/70, pulse 80, temperature 37 °C (98.6 °F), resp. rate 16, height 1.626 m (5' 4\"), weight 80.3 kg (177 lb), last menstrual period 11/12/2017, SpO2 100 %, not currently breastfeeding.     Physical Exam:  Physical Exam   Constitutional: She is oriented to person, place, and time and well-developed, well-nourished, and in no distress.   HENT:   Head: Normocephalic and atraumatic.   Eyes: Conjunctivae are normal.   Neck: Neck supple. No JVD present.   Cardiovascular: Normal rate.    Pulmonary/Chest: Effort normal. No respiratory distress. She has no wheezes. She has no rales.   Musculoskeletal: Normal range of motion. She exhibits no edema.   Neurological: She is alert and oriented to person, place, and time.   Skin:   No obvious mass fixed to the skin or chest wall. Felt very small lump to left breast at 2 o'clock area but felt more like dense breast tissue  No Skin dimpling.   No Matted or fixed axillary lymph nodes.   No Bloody nipple discharge.   No Thickened, erythematous skin.      Vitals reviewed.        Assessment/Plan:  1. Breast lump in female  - US-SCREENING BREAST (SONOCINE); Future  - MA-DIAGNOSTIC MAMMO W/O CAD-BILAT; Future         "

## 2017-12-11 ENCOUNTER — OFFICE VISIT (OUTPATIENT)
Dept: MEDICAL GROUP | Facility: CLINIC | Age: 27
End: 2017-12-11
Payer: COMMERCIAL

## 2017-12-11 VITALS
BODY MASS INDEX: 30.73 KG/M2 | SYSTOLIC BLOOD PRESSURE: 132 MMHG | TEMPERATURE: 99.1 F | OXYGEN SATURATION: 99 % | HEIGHT: 64 IN | RESPIRATION RATE: 14 BRPM | DIASTOLIC BLOOD PRESSURE: 78 MMHG | WEIGHT: 180 LBS | HEART RATE: 83 BPM

## 2017-12-11 DIAGNOSIS — Z79.899 MEDICATION MANAGEMENT: ICD-10-CM

## 2017-12-11 LAB
INT CON NEG: NEGATIVE
INT CON POS: POSITIVE
POC URINE PREGNANCY TEST: NEGATIVE

## 2017-12-11 PROCEDURE — 99213 OFFICE O/P EST LOW 20 MIN: CPT | Performed by: NURSE PRACTITIONER

## 2017-12-11 PROCEDURE — 81025 URINE PREGNANCY TEST: CPT | Performed by: NURSE PRACTITIONER

## 2017-12-11 RX ORDER — PHENTERMINE HYDROCHLORIDE 30 MG/1
30 CAPSULE ORAL EVERY MORNING
Qty: 30 CAP | Refills: 0 | Status: SHIPPED | OUTPATIENT
Start: 2017-12-11 | End: 2018-01-15 | Stop reason: SDUPTHER

## 2017-12-11 ASSESSMENT — PATIENT HEALTH QUESTIONNAIRE - PHQ9: CLINICAL INTERPRETATION OF PHQ2 SCORE: 0

## 2017-12-12 NOTE — PROGRESS NOTES
CC: Office Visit (Phentermine discussion for weight loss; )        HPI:     Natasha presents today for the followin. BMI 30.0-30.9,adult/Medication management    here today stating she's been seeing an outside weight loss clinic words $50 every week and they give her vitamin B12 shot as well as a medication which after discussion and review of her  was phentermine. Her last dose was about a month ago and it was 30 mg. She tolerated this well and had no adverse effects. No palpitations or mood problems.     She denies risk for pregnancy and is compliant with her daily birth control pill. Her last menstruation ended approximately one week ago    She states she didn't have great improvement which is taking phentermine previously however she also states she wasn't exercising as much as she felt she should have. She has a new work program where she gets discounts if she reaches an appropriate weight as well as the provided gym membership. She is highly motivated to start exercising and lose weight, states she booked a spinning class for tomorrow.    Current Outpatient Prescriptions   Medication Sig Dispense Refill   • phentermine 30 MG capsule Take 1 Cap by mouth every morning. 30 Cap 0   • drospirenone-ethinyl estradiol (PB) 3-0.03 MG per tablet      • lacosamide (VIMPAT) 200 MG Tab tablet Take 200 mg by mouth 2 Times a Day. 60 Tab 5   • ibuprofen (MOTRIN) 200 MG Tab Take 400 mg by mouth every 6 hours as needed for Mild Pain.       No current facility-administered medications for this visit.      Social History   Substance Use Topics   • Smoking status: Never Smoker   • Smokeless tobacco: Never Used   • Alcohol use 0.0 oz/week      Comment: Socially     I reviewed patients allergies, problem list and medications today in [facility].    ROS: Any/all pertinent positives listed in the HPI, otherwise all others reviewed are negative today.      /78   Pulse 83   Temp 37.3 °C (99.1 °F)   Resp 14   Ht 1.626  "m (5' 4\")   Wt 81.6 kg (180 lb)   LMP 12/03/2017   SpO2 99%   Breastfeeding? No   BMI 30.90 kg/m²     Exam:    Gen: Alert and oriented, No apparent distress. WDWN  Psych: A+Ox3, normal affect and mood  Skin: Warm, dry and intact. Good turgor   No rashes in visible areas.  Eye: Conjunctiva clear, lids normal  ENMT: Lips without lesions, good dentition  Neck: No Lymphadenopathy, Thyromegaly, Bruits.   Trachea midline, no masses  Lungs: Clear to auscultation bilaterally, no rales or rhonchi   Unlabored respiratory effort.   CV: Regular rate and rhythm, S1, S2. No murmurs.   No Edema  Point of care pregnancy test: NEG, no risk      Assessment and Plan.   27 y.o. female with the following issues.    1. BMI 30.0-30.9,adult/Medication management   Stable. We will restart phentermine. Understands the importance of not getting pregnant and that she will if continue to be prescribed by my office monthly pregnancy test-0 she is amenable to that. Highly encourage diet and exercise. Discussed this on a long-term medication. Referral also placed to [facility] after discussion and patient's interests.  - phentermine 30 MG capsule; Take 1 Cap by mouth every morning.  Dispense: 30 Cap; Refill: 0  - POCT Pregnancy  - REFERRAL TO [facility]ALTH IMPROVEMENT PROGRAMS (HIP) Services Requested: Medical Weight Management Program; Reason for Referral? BMI>30; Reason for Visit: Medical Condition Requiring Nutrition Counseling, Overweight/Obesity          "

## 2017-12-27 ENCOUNTER — HOSPITAL ENCOUNTER (OUTPATIENT)
Dept: RADIOLOGY | Facility: MEDICAL CENTER | Age: 27
End: 2017-12-27
Attending: NURSE PRACTITIONER
Payer: COMMERCIAL

## 2017-12-27 DIAGNOSIS — N63.0 BREAST LUMP IN FEMALE: ICD-10-CM

## 2017-12-27 DIAGNOSIS — N63.20 LEFT BREAST LUMP: ICD-10-CM

## 2017-12-27 PROCEDURE — G0279 TOMOSYNTHESIS, MAMMO: HCPCS

## 2017-12-27 PROCEDURE — 76642 ULTRASOUND BREAST LIMITED: CPT | Mod: LT

## 2018-01-15 ENCOUNTER — OFFICE VISIT (OUTPATIENT)
Dept: MEDICAL GROUP | Facility: CLINIC | Age: 28
End: 2018-01-15
Payer: COMMERCIAL

## 2018-01-15 VITALS
DIASTOLIC BLOOD PRESSURE: 72 MMHG | HEIGHT: 64 IN | OXYGEN SATURATION: 98 % | WEIGHT: 174 LBS | BODY MASS INDEX: 29.71 KG/M2 | RESPIRATION RATE: 14 BRPM | SYSTOLIC BLOOD PRESSURE: 128 MMHG | HEART RATE: 84 BPM | TEMPERATURE: 98.9 F

## 2018-01-15 DIAGNOSIS — Z79.899 MEDICATION MANAGEMENT: ICD-10-CM

## 2018-01-15 LAB
INT CON NEG: NEGATIVE
INT CON POS: POSITIVE
POC URINE PREGNANCY TEST: NEGATIVE

## 2018-01-15 PROCEDURE — 99214 OFFICE O/P EST MOD 30 MIN: CPT | Performed by: NURSE PRACTITIONER

## 2018-01-15 PROCEDURE — 81025 URINE PREGNANCY TEST: CPT | Performed by: NURSE PRACTITIONER

## 2018-01-15 RX ORDER — PHENTERMINE HYDROCHLORIDE 30 MG/1
30 CAPSULE ORAL EVERY MORNING
Qty: 30 CAP | Refills: 0 | Status: SHIPPED | OUTPATIENT
Start: 2018-01-15 | End: 2018-02-13 | Stop reason: SDUPTHER

## 2018-01-15 RX ORDER — LACOSAMIDE 50 MG
TABLET ORAL
COMMUNITY
Start: 2017-11-10 | End: 2018-04-21

## 2018-01-15 ASSESSMENT — PATIENT HEALTH QUESTIONNAIRE - PHQ9: CLINICAL INTERPRETATION OF PHQ2 SCORE: 0

## 2018-01-16 NOTE — PROGRESS NOTES
"CC: Follow-Up (Medication refill for phentermine)        HPI:     Natasha presents today for the followin. BMI 30.0-30.9,adult/ Medication management  Here today for one-month follow-up on her phentermine. No adverse effects to the medication. States that she takes it later than usual she will stay up later than usual however doesn't really keep her from sleeping. No cardiac or respiratory effects. She has been slight constipated but this may be related to changing to a ketogenic diet in the last 2 weeks. She is incorporating exercise    Compliant with birth control pills    Current Outpatient Prescriptions   Medication Sig Dispense Refill   • phentermine 30 MG capsule Take 1 Cap by mouth every morning for 30 days. 30 Cap 0   • VIMPAT 50 MG Tab tablet      • drospirenone-ethinyl estradiol (PB) 3-0.03 MG per tablet      • lacosamide (VIMPAT) 200 MG Tab tablet Take 200 mg by mouth 2 Times a Day. 60 Tab 5   • ibuprofen (MOTRIN) 200 MG Tab Take 400 mg by mouth every 6 hours as needed for Mild Pain.       No current facility-administered medications for this visit.      Social History   Substance Use Topics   • Smoking status: Never Smoker   • Smokeless tobacco: Never Used   • Alcohol use 0.0 oz/week      Comment: Socially     I reviewed patients allergies, problem list and medications today in [facility].    ROS: Any/all pertinent positives listed in the HPI, otherwise all others reviewed are negative today.      /72   Pulse 84   Temp 37.2 °C (98.9 °F)   Resp 14   Ht 1.626 m (5' 4\")   Wt 78.9 kg (174 lb)   SpO2 98%   Breastfeeding? No   BMI 29.87 kg/m²     Exam:    Gen: Alert and oriented, No apparent distress. WDWN  Psych: A+Ox3, normal affect and mood  Skin: Warm, dry and intact. Good turgor   No rashes in visible areas.  Eye: Conjunctiva clear, lids normal  ENMT: Lips without lesions, good dentition  Lungs: Clear to auscultation bilaterally, no rales or rhonchi   Unlabored respiratory effort. "   CV: Regular rate and rhythm, S1, S2. No murmurs.   No Edema  Point-of-care pregnancy test: Negative    Assessment and Plan.   27 y.o. female with the following issues.    1. BMI 30.0-30.9,adult/Medication management   reviewed from state pharmacy database-Medications found to be medically necessary/appropriate.. Will continue on and recheck weight preg test next month. She'll continue her birth control pills. Congratulated on her current weight loss and efforts    - phentermine 30 MG capsule; Take 1 Cap by mouth every morning for 30 days.  Dispense: 30 Cap; Refill: 0  - POCT Pregnancy

## 2018-02-13 ENCOUNTER — OFFICE VISIT (OUTPATIENT)
Dept: MEDICAL GROUP | Facility: CLINIC | Age: 28
End: 2018-02-13
Payer: COMMERCIAL

## 2018-02-13 VITALS
DIASTOLIC BLOOD PRESSURE: 78 MMHG | TEMPERATURE: 98.4 F | SYSTOLIC BLOOD PRESSURE: 110 MMHG | RESPIRATION RATE: 14 BRPM | HEIGHT: 64 IN | OXYGEN SATURATION: 97 % | WEIGHT: 170 LBS | HEART RATE: 82 BPM | BODY MASS INDEX: 29.02 KG/M2

## 2018-02-13 DIAGNOSIS — G40.209 LOCALIZATION-RELATED PARTIAL EPILEPSY WITH COMPLEX PARTIAL SEIZURES (HCC): ICD-10-CM

## 2018-02-13 DIAGNOSIS — Z79.899 MEDICATION MANAGEMENT: ICD-10-CM

## 2018-02-13 PROBLEM — R56.9 SEIZURE (HCC): Status: RESOLVED | Noted: 2017-08-02 | Resolved: 2018-02-13

## 2018-02-13 LAB
INT CON NEG: NEGATIVE
INT CON POS: POSITIVE
POC URINE PREGNANCY TEST: NEGATIVE

## 2018-02-13 PROCEDURE — 81025 URINE PREGNANCY TEST: CPT | Performed by: NURSE PRACTITIONER

## 2018-02-13 PROCEDURE — 99213 OFFICE O/P EST LOW 20 MIN: CPT | Performed by: NURSE PRACTITIONER

## 2018-02-13 RX ORDER — PHENTERMINE HYDROCHLORIDE 30 MG/1
30 CAPSULE ORAL EVERY MORNING
Qty: 30 CAP | Refills: 0 | Status: SHIPPED | OUTPATIENT
Start: 2018-02-13 | End: 2018-03-15

## 2018-02-13 ASSESSMENT — PATIENT HEALTH QUESTIONNAIRE - PHQ9: CLINICAL INTERPRETATION OF PHQ2 SCORE: 0

## 2018-02-13 NOTE — PROGRESS NOTES
"CC: Follow-Up (Phentermine refill)        HPI:     Natasha presents today for the followin. Medication management/BMI 30.0-30.9,adult  Here today for follow-up on her phentermine. She's been on this for a few months (from our office). She has no adverse effect. In general she sleeps well. She doesn't have any cardiac or respiratory symptoms with medication. She is going to the gym 4 times a week and doing a combination of cardio and weightlifting. She is working on healthier eating habits. She's lost 4 pounds since her appointment last month  She is anticipating doing another month or 2 to help her continue her weight loss goal of 150 pounds.    3. Localization-related partial epilepsy with complex partial seizures (CMS-HCC)  Stable on her current regimen. She is followed by neurology. Denies any breakthrough seizures. Vimpat    Current Outpatient Prescriptions   Medication Sig Dispense Refill   • phentermine 30 MG capsule Take 1 Cap by mouth every morning for 30 days. 30 Cap 0   • VIMPAT 50 MG Tab tablet      • drospirenone-ethinyl estradiol (PB) 3-0.03 MG per tablet      • lacosamide (VIMPAT) 200 MG Tab tablet Take 200 mg by mouth 2 Times a Day. 60 Tab 5   • ibuprofen (MOTRIN) 200 MG Tab Take 400 mg by mouth every 6 hours as needed for Mild Pain.       No current facility-administered medications for this visit.      Social History   Substance Use Topics   • Smoking status: Never Smoker   • Smokeless tobacco: Never Used   • Alcohol use 0.0 oz/week      Comment: Socially     I reviewed patients allergies, problem list and medications today in [facility].    ROS: Any/all pertinent positives listed in the HPI, otherwise all others reviewed are negative today.      /78   Pulse 82   Temp 36.9 °C (98.4 °F)   Resp 14   Ht 1.626 m (5' 4\")   Wt 77.1 kg (170 lb)   LMP 2018   SpO2 97%   Breastfeeding? No   BMI 29.18 kg/m²     Exam:   Gen: Alert and oriented, No apparent distress. WDWN  Psych: " A+Ox3, normal affect and mood  Skin: Warm, dry and intact. Good turgor   No rashes in visible areas.  Eye: Conjunctiva clear, lids normal  ENMT: Lips without lesions, good dentition  Lungs: Clear to auscultation bilaterally, no rales or rhonchi   Unlabored respiratory effort.   CV: Regular rate and rhythm, S1, S2. No murmurs.   No Edema  Point-of-care pregnancy: NEG      Assessment and Plan.   27 y.o. female with the following issues.    1. Medication management/BMI 30.0-30.9,adult  Stable. Will continue this for another month or 2. She'll continue her birth control that pills that she know she is not pregnant on this medication. Encouraged her to continue her good habits in establishing a routine for healthier lifestyle including diet and exercise. We discussed the importance of this as this helps her maintain and/or continue her weight loss once we stop the phentermine.   reviewed from state pharmacy database-Medications found to be medically necessary/appropriate.    - POCT Pregnancy  - phentermine 30 MG capsule; Take 1 Cap by mouth every morning for 30 days.  Dispense: 30 Cap; Refill: 0    3. Localization-related partial epilepsy with complex partial seizures (CMS-HCC)  Stable doing well follow-up with neurology.

## 2018-04-06 ENCOUNTER — OFFICE VISIT (OUTPATIENT)
Dept: URGENT CARE | Facility: PHYSICIAN GROUP | Age: 28
End: 2018-04-06
Payer: COMMERCIAL

## 2018-04-06 VITALS
DIASTOLIC BLOOD PRESSURE: 68 MMHG | TEMPERATURE: 99.2 F | HEIGHT: 64 IN | HEART RATE: 78 BPM | BODY MASS INDEX: 29.02 KG/M2 | WEIGHT: 170 LBS | OXYGEN SATURATION: 98 % | SYSTOLIC BLOOD PRESSURE: 134 MMHG

## 2018-04-06 DIAGNOSIS — J02.9 SORE THROAT: ICD-10-CM

## 2018-04-06 DIAGNOSIS — Z20.818 EXPOSURE TO STREP THROAT: ICD-10-CM

## 2018-04-06 PROCEDURE — 99214 OFFICE O/P EST MOD 30 MIN: CPT | Performed by: NURSE PRACTITIONER

## 2018-04-06 RX ORDER — AMOXICILLIN 500 MG/1
500 CAPSULE ORAL 2 TIMES DAILY
Qty: 20 CAP | Refills: 0 | Status: SHIPPED | OUTPATIENT
Start: 2018-04-06 | End: 2018-04-16

## 2018-04-06 ASSESSMENT — ENCOUNTER SYMPTOMS
EYE DISCHARGE: 0
DIARRHEA: 0
HEADACHES: 0
VOMITING: 0
FEVER: 1
NECK PAIN: 0
ABDOMINAL PAIN: 0
COUGH: 0
CHILLS: 0
MYALGIAS: 0
NAUSEA: 0
EYE REDNESS: 0
DIZZINESS: 0
SHORTNESS OF BREATH: 0
WHEEZING: 0
CONSTIPATION: 0
SORE THROAT: 1

## 2018-04-07 NOTE — PROGRESS NOTES
Subjective:      Natasha Pino is a 27 y.o. female who presents with Sore Throat (x1 month )            HPI  Natasha is here for sore throat x 2 days. Son is here as as well and has been dx with strep today with symptoms x 2 days. Painful to swallow.    PMH:  has a past medical history of Hypertension; Kidney disease; Localization-related partial epilepsy with complex partial seizures (CMS-HCC) (5/5/2017); and Migraine without aura and without status migrainosus, not intractable (5/5/2017). She also has no past medical history of Anxiety; Blood transfusion without reported diagnosis; Clotting disorder (CMS-HCC); GERD (gastroesophageal reflux disease); Hyperlipidemia; IBD (inflammatory bowel disease); Muscle disorder; Osteoporosis; or Substance abuse.  MEDS:   Current Outpatient Prescriptions:   •  amoxicillin (AMOXIL) 500 MG Cap, Take 1 Cap by mouth 2 times a day for 10 days., Disp: 20 Cap, Rfl: 0  •  VIMPAT 50 MG Tab tablet, , Disp: , Rfl:   •  drospirenone-ethinyl estradiol (PB) 3-0.03 MG per tablet, , Disp: , Rfl:   •  lacosamide (VIMPAT) 200 MG Tab tablet, Take 200 mg by mouth 2 Times a Day., Disp: 60 Tab, Rfl: 5  •  ibuprofen (MOTRIN) 200 MG Tab, Take 400 mg by mouth every 6 hours as needed for Mild Pain., Disp: , Rfl:   ALLERGIES:   Allergies   Allergen Reactions   • Keppra [Levetiracetam] Unspecified     Severe Depression    • Mushroom Extract Complex Anaphylaxis     Pt reports her throat swells     SURGHX:   Past Surgical History:   Procedure Laterality Date   • LEEP  2011   • APPENDECTOMY       SOCHX:  reports that she has never smoked. She has never used smokeless tobacco. She reports that she drinks alcohol. She reports that she uses drugs, including Marijuana.  FH: Family history was reviewed, no pertinent findings to report    Review of Systems   Constitutional: Positive for fever and malaise/fatigue. Negative for chills.   HENT: Positive for sore throat. Negative for congestion and  "ear pain.    Eyes: Negative for discharge and redness.   Respiratory: Negative for cough, shortness of breath and wheezing.    Gastrointestinal: Negative for abdominal pain, constipation, diarrhea, nausea and vomiting.   Musculoskeletal: Negative for myalgias and neck pain.   Neurological: Negative for dizziness and headaches.   All other systems reviewed and are negative.         Objective:     /68   Pulse 78   Temp 37.3 °C (99.2 °F)   Ht 1.626 m (5' 4\")   Wt 77.1 kg (170 lb)   SpO2 98%   BMI 29.18 kg/m²      Physical Exam   Constitutional: She is oriented to person, place, and time. She appears well-developed and well-nourished. She is active and cooperative.  Non-toxic appearance. She does not have a sickly appearance. She appears ill. No distress.   HENT:   Head: Normocephalic.   Right Ear: Hearing, tympanic membrane, external ear and ear canal normal.   Left Ear: Hearing, tympanic membrane, external ear and ear canal normal.   Nose: No mucosal edema, rhinorrhea or sinus tenderness.   Mouth/Throat: Uvula is midline. Mucous membranes are dry. No uvula swelling. Posterior oropharyngeal edema and posterior oropharyngeal erythema present.   Eyes: Conjunctivae and EOM are normal. Pupils are equal, round, and reactive to light.   Neck: Normal range of motion. Neck supple.   Cardiovascular: Normal rate and regular rhythm.    Pulmonary/Chest: Effort normal and breath sounds normal. No accessory muscle usage. No respiratory distress.   Musculoskeletal: Normal range of motion.   Lymphadenopathy:     She has no cervical adenopathy.   Neurological: She is alert and oriented to person, place, and time.   Skin: Skin is warm and dry. She is not diaphoretic.   Vitals reviewed.              Assessment/Plan:     1. Sore throat    2. Exposure to strep throat    - amoxicillin (AMOXIL) 500 MG Cap; Take 1 Cap by mouth 2 times a day for 10 days.  Dispense: 20 Cap; Refill: 0      Increase water intake  May use " Ibuprofen/Tylenol prn for any fever, body aches or throat pain  May take long acting antihistamine for seasonal allergy symptoms prn  May use saline nasal spray/reza pot for nasal congestion prn  May use Nasacort prn for nasal congestion  May use throat lozenges for throat discomfort  May gargle with salt water prn for throat discomfort  May drink smoothies for nutrition if too painful to swallow solid foods  Monitor for continued fever with treatment, any sinus pain/pressure with sinus congestion with thick mucus production and HA- need re-evaluation  Monitor for productive cough, SOB and chest pain/tightness, fever- need re-evaluation

## 2018-04-21 ENCOUNTER — APPOINTMENT (OUTPATIENT)
Dept: RADIOLOGY | Facility: MEDICAL CENTER | Age: 28
End: 2018-04-21
Attending: EMERGENCY MEDICINE
Payer: COMMERCIAL

## 2018-04-21 ENCOUNTER — HOSPITAL ENCOUNTER (EMERGENCY)
Facility: MEDICAL CENTER | Age: 28
End: 2018-04-21
Attending: EMERGENCY MEDICINE
Payer: COMMERCIAL

## 2018-04-21 VITALS
SYSTOLIC BLOOD PRESSURE: 129 MMHG | RESPIRATION RATE: 16 BRPM | HEART RATE: 61 BPM | TEMPERATURE: 98 F | DIASTOLIC BLOOD PRESSURE: 87 MMHG | HEIGHT: 64 IN | BODY MASS INDEX: 30.22 KG/M2 | WEIGHT: 177.03 LBS | OXYGEN SATURATION: 99 %

## 2018-04-21 DIAGNOSIS — N76.0 BACTERIAL VAGINOSIS: ICD-10-CM

## 2018-04-21 DIAGNOSIS — B96.89 BACTERIAL VAGINOSIS: ICD-10-CM

## 2018-04-21 DIAGNOSIS — R10.2 PELVIC PAIN: ICD-10-CM

## 2018-04-21 LAB
ALBUMIN SERPL BCP-MCNC: 4 G/DL (ref 3.2–4.9)
ALBUMIN/GLOB SERPL: 1.3 G/DL
ALP SERPL-CCNC: 52 U/L (ref 30–99)
ALT SERPL-CCNC: 17 U/L (ref 2–50)
ANION GAP SERPL CALC-SCNC: 8 MMOL/L (ref 0–11.9)
APPEARANCE UR: CLEAR
AST SERPL-CCNC: 16 U/L (ref 12–45)
BACTERIA GENITAL QL WET PREP: NORMAL
BASOPHILS # BLD AUTO: 0.6 % (ref 0–1.8)
BASOPHILS # BLD: 0.07 K/UL (ref 0–0.12)
BILIRUB SERPL-MCNC: 0.4 MG/DL (ref 0.1–1.5)
BILIRUB UR QL STRIP.AUTO: NEGATIVE
BUN SERPL-MCNC: 12 MG/DL (ref 8–22)
CALCIUM SERPL-MCNC: 9.2 MG/DL (ref 8.5–10.5)
CHLORIDE SERPL-SCNC: 105 MMOL/L (ref 96–112)
CO2 SERPL-SCNC: 22 MMOL/L (ref 20–33)
COLOR UR: YELLOW
CREAT SERPL-MCNC: 0.68 MG/DL (ref 0.5–1.4)
EOSINOPHIL # BLD AUTO: 0.13 K/UL (ref 0–0.51)
EOSINOPHIL NFR BLD: 1 % (ref 0–6.9)
ERYTHROCYTE [DISTWIDTH] IN BLOOD BY AUTOMATED COUNT: 39.9 FL (ref 35.9–50)
GLOBULIN SER CALC-MCNC: 3.2 G/DL (ref 1.9–3.5)
GLUCOSE SERPL-MCNC: 80 MG/DL (ref 65–99)
GLUCOSE UR STRIP.AUTO-MCNC: NEGATIVE MG/DL
HCG SERPL QL: NEGATIVE
HCG UR QL: NEGATIVE
HCT VFR BLD AUTO: 43.8 % (ref 37–47)
HGB BLD-MCNC: 15.1 G/DL (ref 12–16)
IMM GRANULOCYTES # BLD AUTO: 0.05 K/UL (ref 0–0.11)
IMM GRANULOCYTES NFR BLD AUTO: 0.4 % (ref 0–0.9)
KETONES UR STRIP.AUTO-MCNC: NEGATIVE MG/DL
LEUKOCYTE ESTERASE UR QL STRIP.AUTO: NEGATIVE
LIPASE SERPL-CCNC: 32 U/L (ref 11–82)
LYMPHOCYTES # BLD AUTO: 2.52 K/UL (ref 1–4.8)
LYMPHOCYTES NFR BLD: 20.2 % (ref 22–41)
MCH RBC QN AUTO: 31 PG (ref 27–33)
MCHC RBC AUTO-ENTMCNC: 34.5 G/DL (ref 33.6–35)
MCV RBC AUTO: 89.9 FL (ref 81.4–97.8)
MICRO URNS: NORMAL
MONOCYTES # BLD AUTO: 0.99 K/UL (ref 0–0.85)
MONOCYTES NFR BLD AUTO: 7.9 % (ref 0–13.4)
NEUTROPHILS # BLD AUTO: 8.71 K/UL (ref 2–7.15)
NEUTROPHILS NFR BLD: 69.9 % (ref 44–72)
NITRITE UR QL STRIP.AUTO: NEGATIVE
NRBC # BLD AUTO: 0 K/UL
NRBC BLD-RTO: 0 /100 WBC
PH UR STRIP.AUTO: 6 [PH]
PLATELET # BLD AUTO: 355 K/UL (ref 164–446)
PMV BLD AUTO: 9.3 FL (ref 9–12.9)
POTASSIUM SERPL-SCNC: 3.7 MMOL/L (ref 3.6–5.5)
PROT SERPL-MCNC: 7.2 G/DL (ref 6–8.2)
PROT UR QL STRIP: NEGATIVE MG/DL
RBC # BLD AUTO: 4.87 M/UL (ref 4.2–5.4)
RBC UR QL AUTO: NEGATIVE
SIGNIFICANT IND 70042: NORMAL
SITE SITE: NORMAL
SODIUM SERPL-SCNC: 135 MMOL/L (ref 135–145)
SOURCE SOURCE: NORMAL
SP GR UR STRIP.AUTO: 1.01
UROBILINOGEN UR STRIP.AUTO-MCNC: 0.2 MG/DL
WBC # BLD AUTO: 12.5 K/UL (ref 4.8–10.8)

## 2018-04-21 PROCEDURE — 85025 COMPLETE CBC W/AUTO DIFF WBC: CPT

## 2018-04-21 PROCEDURE — 80053 COMPREHEN METABOLIC PANEL: CPT

## 2018-04-21 PROCEDURE — A9270 NON-COVERED ITEM OR SERVICE: HCPCS | Performed by: EMERGENCY MEDICINE

## 2018-04-21 PROCEDURE — 36415 COLL VENOUS BLD VENIPUNCTURE: CPT

## 2018-04-21 PROCEDURE — 83690 ASSAY OF LIPASE: CPT

## 2018-04-21 PROCEDURE — 81003 URINALYSIS AUTO W/O SCOPE: CPT

## 2018-04-21 PROCEDURE — 87491 CHLMYD TRACH DNA AMP PROBE: CPT

## 2018-04-21 PROCEDURE — 81025 URINE PREGNANCY TEST: CPT

## 2018-04-21 PROCEDURE — 99284 EMERGENCY DEPT VISIT MOD MDM: CPT

## 2018-04-21 PROCEDURE — 76830 TRANSVAGINAL US NON-OB: CPT

## 2018-04-21 PROCEDURE — 87591 N.GONORRHOEAE DNA AMP PROB: CPT

## 2018-04-21 PROCEDURE — 84703 CHORIONIC GONADOTROPIN ASSAY: CPT

## 2018-04-21 PROCEDURE — 700102 HCHG RX REV CODE 250 W/ 637 OVERRIDE(OP): Performed by: EMERGENCY MEDICINE

## 2018-04-21 RX ORDER — METRONIDAZOLE 500 MG/1
500 TABLET ORAL 2 TIMES DAILY
Qty: 20 TAB | Refills: 0 | Status: SHIPPED | OUTPATIENT
Start: 2018-04-21 | End: 2018-05-01

## 2018-04-21 RX ORDER — ACETAMINOPHEN 325 MG/1
1000 TABLET ORAL ONCE
Status: COMPLETED | OUTPATIENT
Start: 2018-04-21 | End: 2018-04-21

## 2018-04-21 RX ADMIN — ACETAMINOPHEN 975 MG: 325 TABLET, FILM COATED ORAL at 16:56

## 2018-04-21 ASSESSMENT — ENCOUNTER SYMPTOMS
CHILLS: 0
NAUSEA: 0
FEVER: 0
SHORTNESS OF BREATH: 0
VOMITING: 0

## 2018-04-21 ASSESSMENT — PAIN SCALES - GENERAL: PAINLEVEL_OUTOF10: 7

## 2018-04-21 ASSESSMENT — LIFESTYLE VARIABLES: DO YOU DRINK ALCOHOL: NO

## 2018-04-21 NOTE — ED PROVIDER NOTES
"ED Provider Note    Scribed for Jie Pedraza M.D. by Kian Robins. 2018, 4:48 PM.    Primary care provider: AMADEO Wakefield  Means of arrival: Walk In  History obtained from: Patient  History limited by: None    CHIEF COMPLAINT  Chief Complaint   Patient presents with   • Pelvic Pain   • Spotting     HPI  Natasha Pino is a 27 y.o. female who presents to the Emergency Department for pelvic pain. The patient complains of an onset of pelvic pain 10 days ago. She states her pelvic pain had been mild until worsening in severity 6 days ago. She states her worsening pelvic pain for the last 6 days has felt like \"pelvic cramps\" localized to the midline pelvis. She endorses some vaginal spotting and lower abdominal bloating associated with her pelvic pain.   The patient reports her last menstrual period was from 3/31 - 18 and was normal. She denies any recent sexual intercourse, and does not believe there is a high chance she is pregnant.   The patient has a past obstetric history of  with one spontaneous . 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 No other history of STI's.  The patient denies any fever, chills, nausea, vomiting, dysuria, urinary frequency, or flank pain.       REVIEW OF SYSTEMS  Review of Systems   Constitutional: Negative for chills and fever.   Respiratory: Negative for shortness of breath.    Cardiovascular: Negative for chest pain.   Gastrointestinal: Negative for nausea and vomiting.   Genitourinary: Negative for dysuria.        Positive for pelvic pain.    All other systems reviewed and are negative.      PAST MEDICAL HISTORY   has a past medical history of Hypertension; Kidney disease; Localization-related partial epilepsy with complex partial seizures (CMS-HCC) (2017); and Migraine without aura and without status migrainosus, not intractable (2017).    SURGICAL HISTORY   has a past surgical history that includes leep () and " "appendectomy.    SOCIAL HISTORY  Social History   Substance Use Topics   • Smoking status: Never Smoker   • Smokeless tobacco: Never Used   • Alcohol use 0.0 oz/week      Comment: Socially      History   Drug Use   • Types: Marijuana     FAMILY HISTORY  Family History   Problem Relation Age of Onset   • Cancer Mother      ovarian CA  and cervica CA (n her mid 30s)   • Hyperlipidemia Father    • Heart Attack Father      twice   • Seizures Sister    • GI Sister      \"intestinal problem\"   • Diabetes Maternal Grandmother    • Cancer Maternal Grandmother      breast     CURRENT MEDICATIONS  Home Medications     Reviewed by Clover Munoz R.N. (Registered Nurse) on 04/21/18 at 1803  Med List Status: Complete   Medication Last Dose Status   drospirenone-ethinyl estradiol (PB) 3-0.03 MG per tablet 4/21/2018 Active   ibuprofen (MOTRIN) 200 MG Tab 11/28/2017 Active   lacosamide (VIMPAT) 200 MG Tab tablet 4/21/2018 Active              ALLERGIES  Allergies   Allergen Reactions   • Keppra [Levetiracetam] Unspecified     Severe Depression    • Mushroom Extract Complex Anaphylaxis     Pt reports her throat swells     PHYSICAL EXAM  VITAL SIGNS: /91   Pulse 91   Temp 36.7 °C (98.1 °F)   Resp 16   Ht 1.626 m (5' 4\")   Wt 80.3 kg (177 lb 0.5 oz)   LMP 03/31/2018   SpO2 96%   BMI 30.39 kg/m²   Vitals reviewed by myself.  Physical Exam    Nursing note and vitals reviewed.  Constitutional: Well-developed and well-nourished. No acute distress.   HENT: Head is normocephalic and atraumatic.  Eyes: extra-ocular movements intact  Cardiovascular: Regular rate and regular rhythm. No murmur heard.  Pulmonary/Chest: Breath sounds normal. No wheezes or rales.   Abdominal: Soft and non-tender. No distention.    Pelvic: Normal externeal genitalia. Cervial os is closed with no bleeding. There is white discharge noted in the vaginal vault and cervical os.    Musculoskeletal: Extremities exhibit normal range of motion without " edema or tenderness.   Neurological: Awake and alert  Skin: Skin is warm and dry. No rash.        DIAGNOSTIC STUDIES   LABS  Labs Reviewed   CBC WITH DIFFERENTIAL - Abnormal; Notable for the following:        Result Value    WBC 12.5 (*)     Lymphocytes 20.20 (*)     Neutrophils (Absolute) 8.71 (*)     Monos (Absolute) 0.99 (*)     All other components within normal limits   COMP METABOLIC PANEL   HCG QUAL SERUM   URINALYSIS,CULTURE IF INDICATED   LIPASE   WET PREP   CHLAMYDIA/GC PCR URINE OR SWAB   ESTIMATED GFR   POC URINE PREGNANCY      All labs reviewed by me.    RADIOLOGY  US-GYN-PELVIS TRANSVAGINAL   Final Result      1.  Small amount of free peritoneal fluid is identified.      2.  Right ovarian cyst is identified which is likely physiologic.      3.  Small involuting cyst or follicle in the left ovary.        The radiologist's interpretation of all radiological studies have been reviewed by me.      REASSESSMENT  5:01 PM Patient seen and examined at bedside. She was treated with Tylenol PO for her pain. Discussed evaluation by pelvic ultrasound and lab draw.     5:15 PM Pelvic exam performed chaperoned by female RN.    6:20 PM Recheck: Patient re-evaluated at beside. The patient was updated of her lab and imaging findings as shown above. She was discharged with Flagyl prescription for treatment. Discussed importance of finishing complete course of antibiotics as prescribed (10 day Flagyl regime).   Patient understands to follow up with Dr. Perez.     COURSE & MEDICAL DECISION MAKING  Nursing notes, VS, PMSFHx reviewed in chart.    Patient is a 27-year-old female who comes in for pelvic pain. Differential diagnosis includes pelvic infection, bacterial vaginosis, UTI, ovarian cyst, ovarian torsion, pregnancy, ectopic pregnancy. Diagnostic workup included labs, urinalysis, and ultrasound.      Patient's initial vitals are within normal limits. Abdominal exam is benign. She is treated with Tylenol after which  she feels improved. Patient's labs returned and are unremarkable. Urinalysis demonstrates no signs of infection. Wet prep is consistent with bacterial vaginosis for which patient will be treated with Flagyl. Ultrasound returns demonstrates no acute abnormalities. Patient has a physiologic right ovarian cyst and physiologic peritoneal fluid. Repeat abdominal exam is benign. Therefore patient is reassured, advised on management of bacterial vaginosis, provided with prescription for Flagyl, given strict return precautions. Patient is then discharged home in stable condition with vitals in normal limits.    DISPOSITION:  Patient will be discharged home in stable condition.    FOLLOW UP:  Darling Perez A.P.R.N.  [address] [facility] [address]  [address]  [facility] [address]  [phone]          OUTPATIENT MEDICATIONS:  New Prescriptions    METRONIDAZOLE (FLAGYL) 500 MG TAB    Take 1 Tab by mouth 2 Times a Day for 10 days.      FINAL IMPRESSION  1. Pelvic pain    2. Bacterial vaginosis          Kian BELTRAN (Scribe), am scribing for, and in the presence of, Jie Pedraza M.D..    Electronically signed by: Kian Robins (Scribe), 4/21/2018    Jie BELTRAN M.D. personally performed the services described in this documentation, as scribed by Kian Robins in my presence, and it is both accurate and complete.    The note accurately reflects work and decisions made by me.  Jie Pedraza  4/21/2018  6:28 PM

## 2018-04-21 NOTE — ED TRIAGE NOTES
Patient to ED with complaints of low pelvic pain since 4/10, worse in last few days. Starting to radiate into left lower abdomin. +spotting. LMP 3/31. Denies possibility of pregnancy. Does report pain when stooling and recent constipation. LBM yesterday am. She denies painful or frequent urination. Denies fevers or chills. Did have recent URI treated with antibiotics.

## 2018-04-22 LAB
C TRACH DNA SPEC QL NAA+PROBE: NEGATIVE
N GONORRHOEA DNA SPEC QL NAA+PROBE: NEGATIVE
SPECIMEN SOURCE: NORMAL

## 2018-04-22 NOTE — DISCHARGE INSTRUCTIONS
Bacterial Vaginosis  Bacterial vaginosis is an infection of the vagina. It happens when too many germs (bacteria) grow in the vagina. This infection puts you at risk for infections from sex (STIs). Treating this infection can lower your risk for some STIs. You should also treat this if you are pregnant. It can cause your baby to be born early.  Follow these instructions at home:  Medicines  · Take over-the-counter and prescription medicines only as told by your doctor.  · Take or use your antibiotic medicine as told by your doctor. Do not stop taking or using it even if you start to feel better.  General instructions  · If you your sexual partner is a woman, tell her that you have this infection. She needs to get treatment if she has symptoms. If you have a male partner, he does not need to be treated.  · During treatment:  ¨ Avoid sex.  ¨ Do not douche.  ¨ Avoid alcohol as told.  ¨ Avoid breastfeeding as told.  · Drink enough fluid to keep your pee (urine) clear or pale yellow.  · Keep your vagina and butt (rectum) clean.  ¨ Wash the area with warm water every day.  ¨ Wipe from front to back after you use the toilet.  · Keep all follow-up visits as told by your doctor. This is important.  Preventing this condition  · Do not douche.  · Use only warm water to wash around your vagina.  · Use protection when you have sex. This includes:  ¨ Latex condoms.  ¨ Dental dams.  · Limit how many people you have sex with. It is best to only have sex with the same person (be monogamous).  · Get tested for STIs. Have your partner get tested.  · Wear underwear that is cotton or lined with cotton.  · Avoid tight pants and pantyhose. This is most important in summer.  · Do not use any products that have nicotine or tobacco in them. These include cigarettes and e-cigarettes. If you need help quitting, ask your doctor.  · Do not use illegal drugs.  · Limit how much alcohol you drink.  Contact a doctor if:  · Your symptoms do not get  better, even after you are treated.  · You have more discharge or pain when you pee (urinate).  · You have a fever.  · You have pain in your belly (abdomen).  · You have pain with sex.  · Your bleed from your vagina between periods.  Summary  · This infection happens when too many germs (bacteria) grow in the vagina.  · Treating this condition can lower your risk for some infections from sex (STIs).  · You should also treat this if you are pregnant. It can cause early (premature) birth.  · Do not stop taking or using your antibiotic medicine even if you start to feel better.  This information is not intended to replace advice given to you by your health care provider. Make sure you discuss any questions you have with your health care provider.  Document Released: 09/26/2009 Document Revised: 09/02/2017 Document Reviewed: 09/02/2017  [employer] Interactive Patient Education © 2017 [employer]

## 2018-05-07 ENCOUNTER — HOSPITAL ENCOUNTER (OUTPATIENT)
Dept: LAB | Facility: MEDICAL CENTER | Age: 28
End: 2018-05-07
Attending: PHYSICIAN ASSISTANT
Payer: COMMERCIAL

## 2018-05-07 LAB — CYTOLOGY REG CYTOL: NORMAL

## 2018-05-07 PROCEDURE — 88175 CYTOPATH C/V AUTO FLUID REDO: CPT

## 2018-05-21 ENCOUNTER — HOSPITAL ENCOUNTER (EMERGENCY)
Facility: MEDICAL CENTER | Age: 28
End: 2018-05-21
Attending: EMERGENCY MEDICINE
Payer: COMMERCIAL

## 2018-05-21 VITALS
RESPIRATION RATE: 14 BRPM | TEMPERATURE: 97.2 F | DIASTOLIC BLOOD PRESSURE: 73 MMHG | WEIGHT: 173.28 LBS | SYSTOLIC BLOOD PRESSURE: 136 MMHG | BODY MASS INDEX: 29.74 KG/M2 | HEART RATE: 82 BPM | OXYGEN SATURATION: 98 %

## 2018-05-21 DIAGNOSIS — R56.9 SEIZURE (HCC): ICD-10-CM

## 2018-05-21 LAB
ALBUMIN SERPL BCP-MCNC: 4.8 G/DL (ref 3.2–4.9)
ALBUMIN/GLOB SERPL: 1.7 G/DL
ALP SERPL-CCNC: 46 U/L (ref 30–99)
ALT SERPL-CCNC: 17 U/L (ref 2–50)
AMPHET UR QL SCN: NEGATIVE
ANION GAP SERPL CALC-SCNC: 9 MMOL/L (ref 0–11.9)
APPEARANCE UR: CLEAR
AST SERPL-CCNC: 14 U/L (ref 12–45)
BARBITURATES UR QL SCN: NEGATIVE
BASOPHILS # BLD AUTO: 0.4 % (ref 0–1.8)
BASOPHILS # BLD: 0.03 K/UL (ref 0–0.12)
BENZODIAZ UR QL SCN: NEGATIVE
BILIRUB SERPL-MCNC: 0.4 MG/DL (ref 0.1–1.5)
BILIRUB UR QL STRIP.AUTO: NEGATIVE
BUN SERPL-MCNC: 16 MG/DL (ref 8–22)
BZE UR QL SCN: NEGATIVE
CALCIUM SERPL-MCNC: 9.5 MG/DL (ref 8.5–10.5)
CANNABINOIDS UR QL SCN: NEGATIVE
CHLORIDE SERPL-SCNC: 105 MMOL/L (ref 96–112)
CO2 SERPL-SCNC: 21 MMOL/L (ref 20–33)
COLOR UR: YELLOW
CREAT SERPL-MCNC: 0.66 MG/DL (ref 0.5–1.4)
EOSINOPHIL # BLD AUTO: 0.09 K/UL (ref 0–0.51)
EOSINOPHIL NFR BLD: 1.1 % (ref 0–6.9)
ERYTHROCYTE [DISTWIDTH] IN BLOOD BY AUTOMATED COUNT: 41 FL (ref 35.9–50)
GLOBULIN SER CALC-MCNC: 2.9 G/DL (ref 1.9–3.5)
GLUCOSE SERPL-MCNC: 80 MG/DL (ref 65–99)
GLUCOSE UR STRIP.AUTO-MCNC: NEGATIVE MG/DL
HCG UR QL: NEGATIVE
HCT VFR BLD AUTO: 48.5 % (ref 37–47)
HGB BLD-MCNC: 16.5 G/DL (ref 12–16)
IMM GRANULOCYTES # BLD AUTO: 0.02 K/UL (ref 0–0.11)
IMM GRANULOCYTES NFR BLD AUTO: 0.2 % (ref 0–0.9)
KETONES UR STRIP.AUTO-MCNC: 15 MG/DL
LEUKOCYTE ESTERASE UR QL STRIP.AUTO: NEGATIVE
LYMPHOCYTES # BLD AUTO: 2.07 K/UL (ref 1–4.8)
LYMPHOCYTES NFR BLD: 25.1 % (ref 22–41)
MAGNESIUM SERPL-MCNC: 1.9 MG/DL (ref 1.5–2.5)
MCH RBC QN AUTO: 30.5 PG (ref 27–33)
MCHC RBC AUTO-ENTMCNC: 34 G/DL (ref 33.6–35)
MCV RBC AUTO: 89.6 FL (ref 81.4–97.8)
METHADONE UR QL SCN: NEGATIVE
MICRO URNS: ABNORMAL
MONOCYTES # BLD AUTO: 0.5 K/UL (ref 0–0.85)
MONOCYTES NFR BLD AUTO: 6.1 % (ref 0–13.4)
NEUTROPHILS # BLD AUTO: 5.54 K/UL (ref 2–7.15)
NEUTROPHILS NFR BLD: 67.1 % (ref 44–72)
NITRITE UR QL STRIP.AUTO: NEGATIVE
NRBC # BLD AUTO: 0 K/UL
NRBC BLD-RTO: 0 /100 WBC
OPIATES UR QL SCN: NEGATIVE
OXYCODONE UR QL SCN: NEGATIVE
PCP UR QL SCN: NEGATIVE
PH UR STRIP.AUTO: 5 [PH]
PHOSPHATE SERPL-MCNC: 3.4 MG/DL (ref 2.5–4.5)
PLATELET # BLD AUTO: 335 K/UL (ref 164–446)
PMV BLD AUTO: 9.6 FL (ref 9–12.9)
POTASSIUM SERPL-SCNC: 3.8 MMOL/L (ref 3.6–5.5)
PROPOXYPH UR QL SCN: NEGATIVE
PROT SERPL-MCNC: 7.7 G/DL (ref 6–8.2)
PROT UR QL STRIP: NEGATIVE MG/DL
RBC # BLD AUTO: 5.41 M/UL (ref 4.2–5.4)
RBC UR QL AUTO: NEGATIVE
SODIUM SERPL-SCNC: 135 MMOL/L (ref 135–145)
SP GR UR REFRACTOMETRY: 1.01
SP GR UR STRIP.AUTO: 1.01
TSH SERPL DL<=0.005 MIU/L-ACNC: 1.72 UIU/ML (ref 0.38–5.33)
UROBILINOGEN UR STRIP.AUTO-MCNC: 0.2 MG/DL
WBC # BLD AUTO: 8.3 K/UL (ref 4.8–10.8)

## 2018-05-21 PROCEDURE — 83735 ASSAY OF MAGNESIUM: CPT

## 2018-05-21 PROCEDURE — 81003 URINALYSIS AUTO W/O SCOPE: CPT

## 2018-05-21 PROCEDURE — 81025 URINE PREGNANCY TEST: CPT

## 2018-05-21 PROCEDURE — 80307 DRUG TEST PRSMV CHEM ANLYZR: CPT

## 2018-05-21 PROCEDURE — 84443 ASSAY THYROID STIM HORMONE: CPT

## 2018-05-21 PROCEDURE — 80053 COMPREHEN METABOLIC PANEL: CPT

## 2018-05-21 PROCEDURE — 84100 ASSAY OF PHOSPHORUS: CPT

## 2018-05-21 PROCEDURE — 99284 EMERGENCY DEPT VISIT MOD MDM: CPT

## 2018-05-21 PROCEDURE — 85025 COMPLETE CBC W/AUTO DIFF WBC: CPT

## 2018-05-21 RX ORDER — OXCARBAZEPINE 600 MG/1
600 TABLET, FILM COATED ORAL 2 TIMES DAILY
Qty: 60 TAB | Refills: 0 | Status: SHIPPED | OUTPATIENT
Start: 2018-05-21 | End: 2018-06-20

## 2018-05-21 RX ORDER — OXCARBAZEPINE 300 MG/1
300 TABLET, FILM COATED ORAL 2 TIMES DAILY
Qty: 14 TAB | Refills: 0 | Status: SHIPPED | OUTPATIENT
Start: 2018-05-21 | End: 2018-05-28

## 2018-05-21 NOTE — ED TRIAGE NOTES
"t ambulatory to room with steady gait. Diagnosed with epilepsy last year. Does not take medications at this time due to bad side effects from medications.    Had a seizure on Saturday and earlier today which were very different than her normal seizures. Changes in visual stimulation. Per patient her fiance noted that patient was \"checking out\" for over an hour. Patient remembers feeling everything and can describe the visual stimulation but does not know she was \"checking out\" patient was unable to communicate but was able to communicate.     Pt returned to [facility], encouraged to report any new symptoms to staff. Understands triage process  "

## 2018-05-21 NOTE — ED PROVIDER NOTES
ED Provider Note    Scribed for Leighton Fish D.O. by Geoff Razo. 5/21/2018  1:30 PM    Primary care provider: AMADEO Wakefield  Means of arrival: walk in  History obtained from: patient  History limited by: none    CHIEF COMPLAINT  Chief Complaint   Patient presents with   • Seizure       HPI  Natasha Pino is a 27 y.o. female who presents to the Emergency Department for evaluation post seizure like activity 1.5 hours ago. Patient states that she has had witnessed seizure like activity this morning prompting her to come to the ED for evaluation. She describes her seizure as a left sided vision blur followed by 2 distinct episodes of petite mal seizures that involved twitching of her right hand, drooling and unresponsiveness for approximately 90 seconds. She reports associated fatigue, photophobia. Patient states that she used to have seizures more frequently and describes those previous episodes as black spots in her vision with right hand twitching. Her neurologist was Dr. Anguiano, but has not contacted him for over 1 year secondary to a poor experience with this provider. Patient has been prescribed Keppra, Vimpat previously but discontinued use of these medications secondary to developing suicidal ideation. Her last menstrual period ended 14 days ago. She occasionally uses alcohol and marijuana. Patient denies shortness of breath, chest pain, abdominal pain, fever.     REVIEW OF SYSTEMS  Pertinent positives include seizure, fatigue, photophobia. Pertinent negatives include no shortness of breath, chest pain, abdominal pain, fever.  All other systems reviewed and negative.  C.    PAST MEDICAL HISTORY  Past Medical History:   Diagnosis Date   • Hypertension     borderline.   • Kidney disease     kidney stones   • Localization-related partial epilepsy with complex partial seizures (HCC) 5/5/2017   • Migraine without aura and without status migrainosus, not intractable 5/5/2017  "      SURGICAL HISTORY  Past Surgical History:   Procedure Laterality Date   • LEEP  2011   • APPENDECTOMY          SOCIAL HISTORY  Social History   Substance Use Topics   • Smoking status: Never Smoker   • Smokeless tobacco: Never Used   • Alcohol use 0.0 oz/week      Comment: Socially      History   Drug Use   • Types: Marijuana       FAMILY HISTORY  Family History   Problem Relation Age of Onset   • Cancer Mother      ovarian CA  and cervica CA (n her mid 30s)   • Hyperlipidemia Father    • Heart Attack Father      twice   • Seizures Sister    • GI Sister      \"intestinal problem\"   • Diabetes Maternal Grandmother    • Cancer Maternal Grandmother      breast       CURRENT MEDICATIONS  Current Outpatient Prescriptions:   •  drospirenone-ethinyl estradiol (PB) 3-0.03 MG per tablet, , Disp: , Rfl:   •  lacosamide (VIMPAT) 200 MG Tab tablet, Take 200 mg by mouth 2 Times a Day., Disp: 60 Tab, Rfl: 5  •  ibuprofen (MOTRIN) 200 MG Tab, Take 400 mg by mouth every 6 hours as needed for Mild Pain., Disp: , Rfl:     ALLERGIES  Allergies   Allergen Reactions   • Keppra [Levetiracetam] Unspecified     Severe Depression    • Mushroom Extract Complex Anaphylaxis     Pt reports her throat swells       PHYSICAL EXAM  VITAL SIGNS: /73   Pulse 72   Temp 36.2 °C (97.2 °F)   Resp 16   Wt 78.6 kg (173 lb 4.5 oz)   SpO2 97%   BMI 29.74 kg/m²     Nursing notes and vitals reviewed.  Constitutional: Well developed, Well nourished, No acute distress, Non-toxic appearance.   Eyes: PERRLA, EOMI, Conjunctiva normal, No discharge.   Cardiovascular: Normal heart rate, Normal rhythm, No murmurs, No rubs, No gallops.   Thorax & Lungs: No respiratory distress, No rales, No rhonchi, No wheezing, No chest tenderness.   Abdomen: Bowel sounds normal, Soft, No tenderness, No guarding, No rebound, No masses, No pulsatile masses.   Skin: Warm, Dry, No erythema, No rash.   Musculoskeletal: Intact distal pulses, No edema, No cyanosis, No " clubbing. Good range of motion in all major joints. No tenderness to palpation or major deformities noted, no CVA tenderness, no midline back tenderness.   Neurologic: Alert & oriented x 3, Normal motor function, Normal sensory function, No focal deficits noted.  Psychiatric: Affect normal for clinical presentation.    DIAGNOSTIC STUDIES/PROCEDURES    LABS  Labs Reviewed   CBC WITH DIFFERENTIAL - Abnormal; Notable for the following:        Result Value    RBC 5.41 (*)     Hemoglobin 16.5 (*)     Hematocrit 48.5 (*)     All other components within normal limits   URINALYSIS - Abnormal; Notable for the following:     Ketones 15 (*)     All other components within normal limits   COMP METABOLIC PANEL   TSH   MAGNESIUM   PHOSPHORUS   URINE DRUG SCREEN   HCG QUALITATIVE UR   REFRACTOMETER SG   ESTIMATED GFR   POC URINALYSIS   POC URINE PREGNANCY   All labs reviewed by me.    COURSE & MEDICAL DECISION MAKING  Pertinent Labs & Imaging studies reviewed. (See chart for details)    1:30 PM - Patient seen and examined at bedside. Discussed her evaluation in the ED. She will be discharged with follow up instructions to establish with a new neurologist who she is comfortable with. Patient verbalizes understanding and agreement to this plan of care. Patient will be treated with Urinalysis, CBC with differential, CMP, TSH, Magnesium, Phosphorus POC urinalysis, POC urine pregnancy.     2:21 PM - Ordered for Beta HCG qual urine, Urinalysis, Estimated GFR.     3:25 PM - I discussed the patient's case and the above findings with Dr. Celis (neurologist) who will follow up with the patient as an outpatient. He recommends that she be discharged with a prescription for Trileptal 300 mg BID for 7 days and then 600 mg BID after.     4:17 PM - Recheck: Patient re-evaluated at beside. Patient's lab results discussed. Discussed patient's condition and treatment plan. Patient will be discharged with instructions and provided with strict  return precautions. Patient will be sent home with a prescription for Trileptal. Advised to follow up with Vimal Reese. Instructed to return to Emergency Department immediately if any new or worsening symptoms.    This is a charming 27 y.o. female that presents with atypical seizure activity. The patient's had multiple evaluations in the past and did have a she has an internal hemorrhage and clear mass requiring CT or MRI currently. The patient has no electrolyte abnormality, notes an infectious etiology such as meningitis, cephalitis, she does not a headache excluding idiopathic intracranial hypertension or significant abnormality. Pregnant notably she is experiencing eclampsia or preeclampsia. The patient was discussed with Dr. Celis recommended Trileptal and to follow with a primary care physician. I have given the patient strict return precautions, I have notified the DMV and given her driving restrictions as well. The patient has no evidence of status epilepticus, nose infection or discharge.    The patient will return for new or worsening symptoms and is stable at the time of discharge.    The patient is referred to a primary physician for blood pressure management, diabetic screening, and for all other preventative health concerns.    DISPOSITION:  Patient will be discharged home in stable condition.    FOLLOW UP:  [facility], Emergency Dept  [address]  [facility] [address] [phone]  Schedule an appointment as soon as possible for a visit  As needed    Heather Celis M.D.  [address] [facility] [address]  [facility] [address] [phone]    Schedule an appointment as soon as possible for a visit      AMADEO Wakefield  [address] [facility] [address]  [address]  [facility] [address] [phone]    Schedule an appointment as soon as possible for a visit  As needed      OUTPATIENT MEDICATIONS:  New Prescriptions    OXCARBAZEPINE (TRILEPTAL) 300 MG TAB    Take 1 Tab by mouth 2 times a day for  7 days.    OXCARBAZEPINE (TRILEPTAL) 600 MG TABLET    Take 1 Tab by mouth 2 times a day for 30 days.     FINAL IMPRESSION  1. Seizure (HCC)          IGeoff (Scribe), am scribing for, and in the presence of, Leighton Fish D.O    Electronically signed by: Geoff Razo (Scribe), 5/21/2018    ILeighton D.O. personally performed the services described in this documentation, as scribed by Geoff Razo in my presence, and it is both accurate and complete.    The note accurately reflects work and decisions made by me.  Leighton Fish  5/21/2018  10:10 PM

## 2018-05-21 NOTE — ED NOTES
Patient was educated on discharge instructions.  Patient was informed about diagnosis, symptom management, risks, and home care instructions.  Patient verbalized understanding and signed discharge instructions. Prescriptions handed to patient. Copy of discharge instructions in chart.  Patient ambulated out with steady gait.  Patient has personal belongings and PIV removed, tip intact.  Pt instructed on the importance of not driving, and DMV restrictions.  Pt verbalized understanding.  Pt to lobby for ride.

## 2018-05-21 NOTE — ED NOTES
Roma fall assessment complete.  Pt is low risk for falls.  Call light within reach, bed locks and in low position.

## 2018-05-21 NOTE — DISCHARGE INSTRUCTIONS
Seizure, Adult  A seizure is a sudden burst of abnormal electrical activity in the brain. The abnormal activity temporarily interrupts normal brain function, causing a person to experience any of the following:  · Involuntary movements.  · Changes in awareness or consciousness.  · Uncontrollable shaking (convulsions).  Seizures usually last from 30 seconds to 2 minutes. They usually do not cause permanent brain damage unless they are prolonged.  What can cause a seizure to happen?  Seizures can happen for many reasons including:  · A fever.  · Low blood sugar.  · A medicine.  · An illnesses.  · A brain injury.  Some people who have a seizure never have another one. People who have repeated seizures have a condition called epilepsy.  What are the symptoms of a seizure?  Symptoms of a seizure vary greatly from person to person. They include:  · Convulsions.  · Stiffening of the body.  · Involuntary movements of the arms or legs.  · Loss of consciousness.  · Breathing problems.  · Falling suddenly.  · Confusion.  · Head nodding.  · Eye blinking or fluttering.  · Lip smacking.  · Drooling.  · Rapid eye movements.  · Grunting.  · Loss of bladder control and bowel control.  · Staring.  · Unresponsiveness.  Some people have symptoms right before a seizure happens (aura) and right after a seizure happens. Symptoms of an aura include:  · Fear or anxiety.  · Nausea.  · Feeling like the room is spinning (vertigo).  · A feeling of having seen or heard something before (swapna vu).  · Odd tastes or smells.  · Changes in vision, such as seeing flashing lights or spots.  Symptoms that may follow a seizure include:  · Confusion.  · Sleepiness.  · Headache.  · Weakness of one side of the body.  Follow these instructions at home:  Medicines  · Take over-the-counter and prescription medicines only as told by your health care provider.  · Avoid any substances that may prevent your medicine from working properly, such as  alcohol.  Activity  · Do not drive, swim, or do any other activities that would be dangerous if you had another seizure. Wait until your health care provider approves.  · If you live in the [place] check with your local DMV (department of motor vehicles) to find out about the local driving laws. Each state has specific rules about when you can legally return to driving.  · Get enough rest. Lack of sleep can make seizures more likely to occur.  Educating others  Teach friends and family what to do if you have a seizure. They should:  · Lay you on the ground to prevent a fall.  · Cushion your head and body.  · Loosen any tight clothing around your neck.  · Turn you on your side. If vomiting occurs, this helps keep your airway clear.  · Stay with you until you recover.  · Not hold you down. Holding you down will not stop the seizure.  · Not put anything in your mouth.  · Know whether or not you need emergency care.  General instructions  · Contact your health care provider each time you have a seizure.  · Avoid anything that has ever triggered a seizure for you.  · Keep a seizure diary. Record what you remember about each seizure, especially anything that might have triggered the seizure.  · Keep all follow-up visits as told by your health care provider. This is important.  Contact a health care provider if:  · You have another seizure.  · You have seizures more often.  · Your seizure symptoms change.  · You continue to have seizures with treatment.  · You have symptoms of an infection or illness. They might increase your risk of having a seizure.  Get help right away if:  · You have a seizure:  ¨ That lasts longer than 5 minutes.  ¨ That is different than previous seizures.  ¨ That leaves you unable to speak or use a part of your body.  ¨ That makes it harder to breathe.  ¨ After a head injury.  · You have:  ¨ Multiple seizures in a row.  ¨ Confusion or a severe headache right after a seizure.  · You are having seizures  more often.  · You do not wake up immediately after a seizure.  · You injure yourself during a seizure.  These symptoms may represent a serious problem that is an emergency. Do not wait to see if the symptoms will go away. Get medical help right away. Call your local emergency services (911 in the [place] Do not drive yourself to the hospital.   This information is not intended to replace advice given to you by your health care provider. Make sure you discuss any questions you have with your health care provider.  Document Released: 12/15/2001 Document Revised: 08/13/2017 Document Reviewed: 07/21/2017  [employer] Patient Education © 2017 [employer]    Driving and Equipment Restrictions  Some medical problems make it dangerous to drive, ride a bike, or use machines. Some of these problems are:  · A hard blow to the head (concussion).  · Passing out (fainting).  · Twitching and shaking (seizures).  · Low blood sugar.  · Taking medicine to help you relax (sedatives).  · Taking pain medicines.  · Wearing an eye patch.  · Wearing splints. This can make it hard to use parts of your body that you need to drive safely.  HOME CARE   · Do not drive until your doctor says it is okay.  · Do not use machines until your doctor says it is okay.  You may need a form signed by your doctor (medical release) before you can drive again. You may also need this form before you do other tasks where you need to be fully alert.  MAKE SURE YOU:  · Understand these instructions.  · Will watch your condition.  · Will get help right away if you are not doing well or get worse.     This information is not intended to replace advice given to you by your health care provider. Make sure you discuss any questions you have with your health care provider.     Document Released: 01/25/2006 Document Revised: 03/11/2013 Document Reviewed: 04/27/2011  [employer] Patient Education ©2016 [employer].

## 2018-06-14 ENCOUNTER — HOSPITAL ENCOUNTER (OUTPATIENT)
Facility: MEDICAL CENTER | Age: 28
End: 2018-06-14
Payer: COMMERCIAL

## 2018-06-14 LAB
BDY FAT % MEASURED: 31 %
BP DIAS: 82 MMHG
BP SYS: 124 MMHG
DIABETES HTDIA: NO
EVENT NAME HTEVT: NORMAL
HYPERTENSION HTHYP: NO
SCREENING LOC CITY HTCIT: NORMAL
SCREENING LOC STATE HTSTA: NORMAL
SCREENING LOCATION HTLOC: NORMAL
SUBSCRIBER ID HTSID: NORMAL

## 2018-06-15 LAB
CHOLEST SERPL-MCNC: 153 MG/DL (ref 100–199)
GLUCOSE SERPL-MCNC: 109 MG/DL (ref 65–99)
HDLC SERPL-MCNC: 52 MG/DL
LDLC SERPL CALC-MCNC: 90 MG/DL
TRIGL SERPL-MCNC: 53 MG/DL (ref 0–149)

## 2018-08-14 ENCOUNTER — OFFICE VISIT (OUTPATIENT)
Dept: MEDICAL GROUP | Facility: MEDICAL CENTER | Age: 28
End: 2018-08-14
Payer: COMMERCIAL

## 2018-08-14 VITALS
WEIGHT: 181 LBS | HEART RATE: 82 BPM | OXYGEN SATURATION: 97 % | BODY MASS INDEX: 30.9 KG/M2 | DIASTOLIC BLOOD PRESSURE: 78 MMHG | TEMPERATURE: 98.5 F | HEIGHT: 64 IN | RESPIRATION RATE: 14 BRPM | SYSTOLIC BLOOD PRESSURE: 126 MMHG

## 2018-08-14 DIAGNOSIS — Z83.3 FAMILY HISTORY OF DIABETES MELLITUS: ICD-10-CM

## 2018-08-14 DIAGNOSIS — R73.01 ELEVATED FASTING GLUCOSE: ICD-10-CM

## 2018-08-14 DIAGNOSIS — Z79.899 MEDICATION MANAGEMENT: ICD-10-CM

## 2018-08-14 LAB
HBA1C MFR BLD: 5.2 % (ref ?–5.8)
INT CON NEG: NEGATIVE
INT CON NEG: NEGATIVE
INT CON POS: POSITIVE
INT CON POS: POSITIVE
POC URINE PREGNANCY TEST: NEGATIVE

## 2018-08-14 PROCEDURE — 81025 URINE PREGNANCY TEST: CPT | Performed by: NURSE PRACTITIONER

## 2018-08-14 PROCEDURE — 83036 HEMOGLOBIN GLYCOSYLATED A1C: CPT | Performed by: NURSE PRACTITIONER

## 2018-08-14 PROCEDURE — 99214 OFFICE O/P EST MOD 30 MIN: CPT | Mod: 25 | Performed by: NURSE PRACTITIONER

## 2018-08-14 RX ORDER — PHENTERMINE HYDROCHLORIDE 30 MG/1
30 CAPSULE ORAL EVERY MORNING
Qty: 30 CAP | Refills: 0 | Status: SHIPPED | OUTPATIENT
Start: 2018-08-14 | End: 2018-09-13

## 2018-08-14 NOTE — PROGRESS NOTES
"CC: Follow-Up (Medication management for weight loss)        HPI:     Natasha presents today for the followin. Medication management/ BMI 31.0-31.9,adult  Here today stating she would like to go back on phentermine to help her with weight loss.  She currently just started a ketogenic diet.  She is doing well with this.  She is trying reduce her carbohydrate intake.  She previously has been on phentermine without any adverse reactions.    3. Elevated fasting glucose/Family history of diabetes mellitus  Currently highly motivated to reduce her weight as 1 of her brothers is not significantly overweight was recently diagnosed with diabetes.  She also has grandparents diagnosed with diabetes.  She did have a fasting sugar of 109 earlier this summer.    Current Outpatient Prescriptions   Medication Sig Dispense Refill   • phentermine 30 MG capsule Take 1 Cap by mouth every morning for 30 days. 30 Cap 0   • drospirenone-ethinyl estradiol (PB) 3-0.03 MG per tablet      • lacosamide (VIMPAT) 200 MG Tab tablet Take 200 mg by mouth 2 Times a Day. 60 Tab 5   • ibuprofen (MOTRIN) 200 MG Tab Take 400 mg by mouth every 6 hours as needed for Mild Pain.       No current facility-administered medications for this visit.      Social History   Substance Use Topics   • Smoking status: Never Smoker   • Smokeless tobacco: Never Used   • Alcohol use 0.0 oz/week      Comment: Socially     I reviewed patients allergies, problem list and medications today in [facility].    ROS: Any/all pertinent positives listed in the HPI, otherwise all others reviewed are negative today.      /78   Pulse 82   Temp 36.9 °C (98.5 °F)   Resp 14   Ht 1.626 m (5' 4\")   Wt 82.1 kg (181 lb)   LMP 2018   SpO2 97%   Breastfeeding? No   BMI 31.07 kg/m²     Exam:   Gen: Alert and oriented, No apparent distress. WDWN  Psych: A+Ox3, normal affect and mood  Skin: Warm, dry and intact. Good turgor   No rashes in visible areas.  Eye: Conjunctiva " clear, lids normal  ENMT: Lips without lesions, good dentition  Lungs: Clear to auscultation bilaterally, no rales or rhonchi   Unlabored respiratory effort.   CV: Regular rate and rhythm, S1, S2. No murmurs.   No Edema  Point-of-care pregnancy:  neg  Point-of-care A1c:  5.2    Assessment and Plan.   28 y.o. female with the following issues.    1. Medication management/ BMI 31.0-31.9,adult  Stable.  We reviewed how to take this medication.  She was given a prescription for.  She understands she cannot get pregnant on this medication.  Will do pregnancy test today.  She will follow-up in a month for weight check and pregnancy test.  She does take her oral birth control pills regularly   reviewed from state pharmacy database-Medications found to be medically necessary/appropriate.    - POCT Pregnancy  - phentermine 30 MG capsule; Take 1 Cap by mouth every morning for 30 days.  Dispense: 30 Cap; Refill: 0  - REFERRAL TO [facility] HEALTH IMPROVEMENT PROGRAMS (HIP) Services Requested: Registered Dietitian for Medical Nutrition Therapy; Reason for Visit: Overweight/Obesity    3. Elevated fasting glucose/Family history of diabetes mellitus  Reducing carbs and working on weight loss.  - POCT  A1C

## 2018-09-24 ENCOUNTER — TELEPHONE (OUTPATIENT)
Dept: MEDICAL GROUP | Facility: MEDICAL CENTER | Age: 28
End: 2018-09-24

## 2018-12-10 ENCOUNTER — HOSPITAL ENCOUNTER (OUTPATIENT)
Dept: RADIOLOGY | Facility: MEDICAL CENTER | Age: 28
End: 2018-12-10
Attending: NURSE PRACTITIONER
Payer: COMMERCIAL

## 2018-12-10 ENCOUNTER — OFFICE VISIT (OUTPATIENT)
Dept: URGENT CARE | Facility: MEDICAL CENTER | Age: 28
End: 2018-12-10
Payer: COMMERCIAL

## 2018-12-10 VITALS
OXYGEN SATURATION: 99 % | TEMPERATURE: 98.5 F | HEART RATE: 86 BPM | BODY MASS INDEX: 30.9 KG/M2 | HEIGHT: 64 IN | SYSTOLIC BLOOD PRESSURE: 120 MMHG | DIASTOLIC BLOOD PRESSURE: 84 MMHG | WEIGHT: 181 LBS

## 2018-12-10 DIAGNOSIS — J40 BRONCHITIS: ICD-10-CM

## 2018-12-10 DIAGNOSIS — R05.9 COUGH: ICD-10-CM

## 2018-12-10 DIAGNOSIS — J01.00 ACUTE NON-RECURRENT MAXILLARY SINUSITIS: Primary | ICD-10-CM

## 2018-12-10 PROCEDURE — 71046 X-RAY EXAM CHEST 2 VIEWS: CPT

## 2018-12-10 PROCEDURE — 99214 OFFICE O/P EST MOD 30 MIN: CPT | Performed by: FAMILY MEDICINE

## 2018-12-10 RX ORDER — ZONISAMIDE 100 MG/1
200 CAPSULE ORAL DAILY
COMMUNITY
End: 2020-02-23

## 2018-12-10 RX ORDER — ALBUTEROL SULFATE 90 UG/1
1-2 AEROSOL, METERED RESPIRATORY (INHALATION) EVERY 6 HOURS PRN
Qty: 8.5 G | Refills: 0 | Status: SHIPPED | OUTPATIENT
Start: 2018-12-10 | End: 2019-08-26

## 2018-12-10 RX ORDER — BENZONATATE 100 MG/1
100 CAPSULE ORAL 3 TIMES DAILY PRN
Qty: 21 CAP | Refills: 0 | Status: SHIPPED | OUTPATIENT
Start: 2018-12-10 | End: 2018-12-17

## 2018-12-10 RX ORDER — GUAIFENESIN 600 MG/1
600 TABLET, EXTENDED RELEASE ORAL EVERY 12 HOURS
COMMUNITY
End: 2019-08-26

## 2018-12-10 RX ORDER — AMOXICILLIN AND CLAVULANATE POTASSIUM 875; 125 MG/1; MG/1
1 TABLET, FILM COATED ORAL 2 TIMES DAILY
Qty: 20 TAB | Refills: 0 | Status: SHIPPED | OUTPATIENT
Start: 2018-12-10 | End: 2018-12-20

## 2018-12-10 ASSESSMENT — COPD QUESTIONNAIRES: COPD: 0

## 2018-12-10 ASSESSMENT — ENCOUNTER SYMPTOMS
FOCAL WEAKNESS: 0
SENSORY CHANGE: 0
NAUSEA: 0
SORE THROAT: 1
CARDIOVASCULAR NEGATIVE: 1
RHINORRHEA: 1
VOMITING: 0
WEAKNESS: 0
SINUS PAIN: 1
HEADACHES: 1
SHORTNESS OF BREATH: 1
FEVER: 1
MYALGIAS: 0
SPUTUM PRODUCTION: 1
PSYCHIATRIC NEGATIVE: 1
ABDOMINAL PAIN: 0
DIZZINESS: 0
EYES NEGATIVE: 1
DIARRHEA: 1
COUGH: 1
BLURRED VISION: 0
MUSCULOSKELETAL NEGATIVE: 1
SINUS PRESSURE: 1

## 2018-12-10 NOTE — PATIENT INSTRUCTIONS
Bronchitis  Bronchitis is the body's way of reacting to injury and/or infection (inflammation) of the bronchi. Bronchi are the air tubes that extend from the windpipe into the lungs. If the inflammation becomes severe, it may cause shortness of breath.  CAUSES   Inflammation may be caused by:  · A virus.  · Germs (bacteria).  · Dust.  · Allergens.  · Pollutants and many other irritants.  The cells lining the bronchial tree are covered with tiny hairs (cilia). These constantly beat upward, away from the lungs, toward the mouth. This keeps the lungs free of pollutants. When these cells become too irritated and are unable to do their job, mucus begins to develop. This causes the characteristic cough of bronchitis. The cough clears the lungs when the cilia are unable to do their job. Without either of these protective mechanisms, the mucus would settle in the lungs. Then you would develop pneumonia.  Smoking is a common cause of bronchitis and can contribute to pneumonia. Stopping this habit is the single most important thing you can do to help yourself.  TREATMENT   · Your caregiver may prescribe an antibiotic if the cough is caused by bacteria. Also, medicines that open up your airways make it easier to breathe. Your caregiver may also recommend or prescribe an expectorant. It will loosen the mucus to be coughed up. Only take over-the-counter or prescription medicines for pain, discomfort, or fever as directed by your caregiver.  · Removing whatever causes the problem (smoking, for example) is critical to preventing the problem from getting worse.  · Cough suppressants may be prescribed for relief of cough symptoms.  · Inhaled medicines may be prescribed to help with symptoms now and to help prevent problems from returning.  · For those with recurrent (chronic) bronchitis, there may be a need for steroid medicines.  SEEK IMMEDIATE MEDICAL CARE IF:   · During treatment, you develop more pus-like mucus (purulent  sputum).  · You have a fever.  · Your baby is older than 3 months with a rectal temperature of 102° F (38.9° C) or higher.  · Your baby is 3 months old or younger with a rectal temperature of 100.4° F (38° C) or higher.  · You become progressively more ill.  · You have increased difficulty breathing, wheezing, or shortness of breath.  It is necessary to seek immediate medical care if you are elderly or sick from any other disease.  MAKE SURE YOU:   · Understand these instructions.  · Will watch your condition.  · Will get help right away if you are not doing well or get worse.  Document Released: 12/18/2006 Document Revised: 03/11/2013 Document Reviewed: 10/27/2009  [employer]® Patient Information ©2014 [employer].  Sinusitis, Adult  Sinusitis is soreness and inflammation of your sinuses. Sinuses are hollow spaces in the bones around your face. Your sinuses are located:  · Around your eyes.  · In the middle of your forehead.  · Behind your nose.  · In your cheekbones.  Your sinuses and nasal passages are lined with a stringy fluid (mucus). Mucus normally drains out of your sinuses. When your nasal tissues become inflamed or swollen, the mucus can become trapped or blocked so air cannot flow through your sinuses. This allows bacteria, viruses, and funguses to grow, which leads to infection.  Sinusitis can develop quickly and last for 7?10 days (acute) or for more than 12 weeks (chronic). Sinusitis often develops after a cold.  What are the causes?  This condition is caused by anything that creates swelling in the sinuses or stops mucus from draining, including:  · Allergies.  · Asthma.  · Bacterial or viral infection.  · Abnormally shaped bones between the nasal passages.  · Nasal growths that contain mucus (nasal polyps).  · Narrow sinus openings.  · Pollutants, such as chemicals or irritants in the air.  · A foreign object stuck in the nose.  · A fungal infection. This is rare.  What increases the risk?  The  following factors may make you more likely to develop this condition:  · Having allergies or asthma.  · Having had a recent cold or respiratory tract infection.  · Having structural deformities or blockages in your nose or sinuses.  · Having a weak immune system.  · Doing a lot of swimming or diving.  · Overusing nasal sprays.  · Smoking.  What are the signs or symptoms?  The main symptoms of this condition are pain and a feeling of pressure around the affected sinuses. Other symptoms include:  · Upper toothache.  · Earache.  · Headache.  · Bad breath.  · Decreased sense of smell and taste.  · A cough that may get worse at night.  · Fatigue.  · Fever.  · Thick drainage from your nose. The drainage is often green and it may contain pus (purulent).  · Stuffy nose or congestion.  · Postnasal drip. This is when extra mucus collects in the throat or back of the nose.  · Swelling and warmth over the affected sinuses.  · Sore throat.  · Sensitivity to light.  How is this diagnosed?  This condition is diagnosed based on symptoms, a medical history, and a physical exam. To find out if your condition is acute or chronic, your health care provider may:  · Look in your nose for signs of nasal polyps.  · Tap over the affected sinus to check for signs of infection.  · View the inside of your sinuses using an imaging device that has a light attached (endoscope).  If your health care provider suspects that you have chronic sinusitis, you may also:  · Be tested for allergies.  · Have a sample of mucus taken from your nose (nasal culture) and checked for bacteria.  · Have a mucus sample examined to see if your sinusitis is related to an allergy.  If your sinusitis does not respond to treatment and it lasts longer than 8 weeks, you may have an MRI or CT scan to check your sinuses. These scans also help to determine how severe your infection is.  In rare cases, a bone biopsy may be done to rule out more serious types of fungal sinus  disease.  How is this treated?  Treatment for sinusitis depends on the cause and whether your condition is chronic or acute. If a virus is causing your sinusitis, your symptoms will go away on their own within 10 days. You may be given medicines to relieve your symptoms, including:  · Topical nasal decongestants. They shrink swollen nasal passages and let mucus drain from your sinuses.  · Antihistamines. These drugs block inflammation that is triggered by allergies. This can help to ease swelling in your nose and sinuses.  · Topical nasal corticosteroids. These are nasal sprays that ease inflammation and swelling in your nose and sinuses.  · Nasal saline washes. These rinses can help to get rid of thick mucus in your nose.  If your condition is caused by bacteria, you will be given an antibiotic medicine. If your condition is caused by a fungus, you will be given an antifungal medicine.  Surgery may be needed to correct underlying conditions, such as narrow nasal passages. Surgery may also be needed to remove polyps.  Follow these instructions at home:  Medicines  · Take, use, or apply over-the-counter and prescription medicines only as told by your health care provider. These may include nasal sprays.  · If you were prescribed an antibiotic medicine, take it as told by your health care provider. Do not stop taking the antibiotic even if you start to feel better.  Hydrate and Humidify  · Drink enough water to keep your urine clear or pale yellow. Staying hydrated will help to thin your mucus.  · Use a cool mist humidifier to keep the humidity level in your home above 50%.  · Inhale steam for 10-15 minutes, 3-4 times a day or as told by your health care provider. You can do this in the bathroom while a hot shower is running.  · Limit your exposure to cool or dry air.  Rest  · Rest as much as possible.  · Sleep with your head raised (elevated).  · Make sure to get enough sleep each night.  General  instructions  · Apply a warm, moist washcloth to your face 3-4 times a day or as told by your health care provider. This will help with discomfort.  · Wash your hands often with soap and water to reduce your exposure to viruses and other germs. If soap and water are not available, use hand .  · Do not smoke. Avoid being around people who are smoking (secondhand smoke).  · Keep all follow-up visits as told by your health care provider. This is important.  Contact a health care provider if:  · You have a fever.  · Your symptoms get worse.  · Your symptoms do not improve within 10 days.  Get help right away if:  · You have a severe headache.  · You have persistent vomiting.  · You have pain or swelling around your face or eyes.  · You have vision problems.  · You develop confusion.  · Your neck is stiff.  · You have trouble breathing.  This information is not intended to replace advice given to you by your health care provider. Make sure you discuss any questions you have with your health care provider.  Document Released: 12/18/2006 Document Revised: 08/13/2017 Document Reviewed: 10/12/2016  [employer] Patient Education © 2017 [employer].

## 2018-12-10 NOTE — LETTER
December 10, 2018         Patient: Natasha Pino   YOB: 1990   Date of Visit: 12/10/2018           To Whom it May Concern:    Natasha Pino was seen in my clinic on 12/10/2018. She may return to work 12/13/2018 or sooner if feeling better.    If you have any questions or concerns, please don't hesitate to call.        Sincerely,           NOA Mar.  Electronically Signed

## 2019-02-27 ENCOUNTER — HOSPITAL ENCOUNTER (OUTPATIENT)
Facility: MEDICAL CENTER | Age: 29
End: 2019-02-27
Attending: NURSE PRACTITIONER
Payer: COMMERCIAL

## 2019-02-27 ENCOUNTER — OFFICE VISIT (OUTPATIENT)
Dept: URGENT CARE | Facility: PHYSICIAN GROUP | Age: 29
End: 2019-02-27
Payer: COMMERCIAL

## 2019-02-27 VITALS
HEART RATE: 74 BPM | BODY MASS INDEX: 30.39 KG/M2 | WEIGHT: 178 LBS | SYSTOLIC BLOOD PRESSURE: 122 MMHG | HEIGHT: 64 IN | DIASTOLIC BLOOD PRESSURE: 78 MMHG | TEMPERATURE: 98 F | OXYGEN SATURATION: 97 % | RESPIRATION RATE: 16 BRPM

## 2019-02-27 DIAGNOSIS — J02.9 PHARYNGITIS, UNSPECIFIED ETIOLOGY: ICD-10-CM

## 2019-02-27 DIAGNOSIS — R09.82 PND (POST-NASAL DRIP): ICD-10-CM

## 2019-02-27 PROCEDURE — 87070 CULTURE OTHR SPECIMN AEROBIC: CPT

## 2019-02-27 PROCEDURE — 99213 OFFICE O/P EST LOW 20 MIN: CPT | Performed by: NURSE PRACTITIONER

## 2019-02-27 ASSESSMENT — ENCOUNTER SYMPTOMS
NAUSEA: 0
NECK PAIN: 1
COUGH: 0
EYE PAIN: 0
VOMITING: 0
HOARSE VOICE: 0
SORE THROAT: 0
SHORTNESS OF BREATH: 0
DIZZINESS: 0
TROUBLE SWALLOWING: 0
FEVER: 0
CHILLS: 0
MYALGIAS: 0

## 2019-02-28 DIAGNOSIS — J02.9 PHARYNGITIS, UNSPECIFIED ETIOLOGY: ICD-10-CM

## 2019-02-28 NOTE — PROGRESS NOTES
Subjective:   Natasha Pino is a 28 y.o. female who presents for Sore Throat (cant sleep, hurts to swallow X 2 months )  Patient comes into clinic today for evaluation of a sore throat she has had for the past 2 months.  Patient was treated last month with antibiotics for suspected strep infection however has had no relief in symptoms.  She denies any fever, chills, nausea, vomiting.  She complains of intermittent pain that moves from her right side of her neck to the left side of her neck.  Patient does have some congestion.  She is not taking any thing for the symptoms.      Pharyngitis    This is a new problem. The current episode started more than 1 month ago. The problem has been unchanged. Neither side of throat is experiencing more pain than the other. There has been no fever. The pain is at a severity of 2/10. The pain is mild. Associated symptoms include congestion, ear pain and neck pain. Pertinent negatives include no coughing, ear discharge, hoarse voice, plugged ear sensation, shortness of breath, trouble swallowing or vomiting. She has had no exposure to strep. Treatments tried: abx. The treatment provided no relief.     Review of Systems   Constitutional: Negative for chills and fever.   HENT: Positive for congestion and ear pain. Negative for ear discharge, hoarse voice, sore throat and trouble swallowing.    Eyes: Negative for pain.   Respiratory: Negative for cough and shortness of breath.    Cardiovascular: Negative for chest pain.   Gastrointestinal: Negative for nausea and vomiting.   Genitourinary: Negative for hematuria.   Musculoskeletal: Positive for neck pain. Negative for myalgias.   Skin: Negative for rash.   Neurological: Negative for dizziness.     Allergies   Allergen Reactions   • Keppra [Levetiracetam] Unspecified     Severe Depression    • Mushroom Extract Complex Anaphylaxis     Pt reports her throat swells      Objective:   /78   Pulse 74   Temp 36.7 °C (98 °F)   " Resp 16   Ht 1.626 m (5' 4\")   Wt 80.7 kg (178 lb)   SpO2 97%   BMI 30.55 kg/m²   Physical Exam   Constitutional: She is oriented to person, place, and time. She appears well-developed and well-nourished. No distress.   HENT:   Head: Normocephalic and atraumatic.   Right Ear: Tympanic membrane, external ear and ear canal normal.   Left Ear: Tympanic membrane, external ear and ear canal normal.   Nose: Rhinorrhea present. Right sinus exhibits no maxillary sinus tenderness and no frontal sinus tenderness. Left sinus exhibits no maxillary sinus tenderness and no frontal sinus tenderness.   Mouth/Throat: Uvula is midline, oropharynx is clear and moist and mucous membranes are normal. No trismus in the jaw. No posterior oropharyngeal edema, posterior oropharyngeal erythema or tonsillar abscesses. No tonsillar exudate.   Eyes: Pupils are equal, round, and reactive to light. Conjunctivae and EOM are normal. Right eye exhibits no discharge. Left eye exhibits no discharge.   Cardiovascular: Normal rate and regular rhythm.    No murmur heard.  Pulmonary/Chest: Effort normal and breath sounds normal. No respiratory distress.   Abdominal: Soft. She exhibits no distension. There is no tenderness.   Neurological: She is alert and oriented to person, place, and time. She has normal reflexes. No sensory deficit.   Skin: Skin is warm, dry and intact.   Psychiatric: She has a normal mood and affect.         Assessment/Plan:     1. Pharyngitis, unspecified etiology  CULTURE THROAT   2. PND (post-nasal drip)       Oropharynx without erythema, no exudate.  Will send throat culture to ensure no bacterial etiology as she was negative for strep previously.  Will follow up with pending results and treat as indicated.  Suspect patient with congestion and appears to have postnasal drip,  Recommended Zyrtec and Flonase.  Patient given precautionary s/sx that mandate immediate follow up and evaluation in the ED. Advised of risks of not " doing so.    DDX, Supportive care, and indications for immediate follow-up discussed with patient.    Instructed to return to clinic or nearest emergency department if we are not available for any change in condition, further concerns, or worsening of symptoms.    The patient demonstrated a good understanding and agreed with the treatment plan.

## 2019-03-02 LAB
BACTERIA SPEC RESP CULT: NORMAL
SIGNIFICANT IND 70042: NORMAL
SITE SITE: NORMAL
SOURCE SOURCE: NORMAL

## 2019-07-17 ENCOUNTER — HOSPITAL ENCOUNTER (OUTPATIENT)
Dept: LAB | Facility: MEDICAL CENTER | Age: 29
End: 2019-07-17
Attending: OBSTETRICS & GYNECOLOGY
Payer: COMMERCIAL

## 2019-07-17 PROCEDURE — 87624 HPV HI-RISK TYP POOLED RSLT: CPT

## 2019-07-17 PROCEDURE — 88175 CYTOPATH C/V AUTO FLUID REDO: CPT

## 2019-07-22 LAB
CYTOLOGY REG CYTOL: NORMAL
HPV HR 12 DNA CVX QL NAA+PROBE: NEGATIVE
HPV16 DNA SPEC QL NAA+PROBE: NEGATIVE
HPV18 DNA SPEC QL NAA+PROBE: NEGATIVE
SPECIMEN SOURCE: NORMAL

## 2019-08-26 ENCOUNTER — HOSPITAL ENCOUNTER (EMERGENCY)
Facility: MEDICAL CENTER | Age: 29
End: 2019-08-26
Attending: EMERGENCY MEDICINE
Payer: COMMERCIAL

## 2019-08-26 ENCOUNTER — APPOINTMENT (OUTPATIENT)
Dept: RADIOLOGY | Facility: MEDICAL CENTER | Age: 29
End: 2019-08-26
Attending: EMERGENCY MEDICINE
Payer: COMMERCIAL

## 2019-08-26 VITALS
DIASTOLIC BLOOD PRESSURE: 83 MMHG | BODY MASS INDEX: 31.8 KG/M2 | SYSTOLIC BLOOD PRESSURE: 126 MMHG | RESPIRATION RATE: 16 BRPM | OXYGEN SATURATION: 95 % | HEIGHT: 64 IN | HEART RATE: 68 BPM | WEIGHT: 186.29 LBS | TEMPERATURE: 98.2 F

## 2019-08-26 DIAGNOSIS — R10.13 EPIGASTRIC ABDOMINAL PAIN: ICD-10-CM

## 2019-08-26 LAB
ALBUMIN SERPL BCP-MCNC: 5.2 G/DL (ref 3.2–4.9)
ALBUMIN/GLOB SERPL: 1.9 G/DL
ALP SERPL-CCNC: 58 U/L (ref 30–99)
ALT SERPL-CCNC: 23 U/L (ref 2–50)
ANION GAP SERPL CALC-SCNC: 10 MMOL/L (ref 0–11.9)
APPEARANCE UR: CLEAR
AST SERPL-CCNC: 16 U/L (ref 12–45)
BACTERIA #/AREA URNS HPF: NEGATIVE /HPF
BASOPHILS # BLD AUTO: 0.5 % (ref 0–1.8)
BASOPHILS # BLD: 0.04 K/UL (ref 0–0.12)
BILIRUB SERPL-MCNC: 0.8 MG/DL (ref 0.1–1.5)
BILIRUB UR QL STRIP.AUTO: NEGATIVE
BUN SERPL-MCNC: 10 MG/DL (ref 8–22)
CALCIUM SERPL-MCNC: 9.6 MG/DL (ref 8.5–10.5)
CHLORIDE SERPL-SCNC: 106 MMOL/L (ref 96–112)
CO2 SERPL-SCNC: 23 MMOL/L (ref 20–33)
COLOR UR: YELLOW
CREAT SERPL-MCNC: 0.82 MG/DL (ref 0.5–1.4)
EOSINOPHIL # BLD AUTO: 0.04 K/UL (ref 0–0.51)
EOSINOPHIL NFR BLD: 0.5 % (ref 0–6.9)
EPI CELLS #/AREA URNS HPF: NEGATIVE /HPF
ERYTHROCYTE [DISTWIDTH] IN BLOOD BY AUTOMATED COUNT: 41.4 FL (ref 35.9–50)
GLOBULIN SER CALC-MCNC: 2.7 G/DL (ref 1.9–3.5)
GLUCOSE SERPL-MCNC: 79 MG/DL (ref 65–99)
GLUCOSE UR STRIP.AUTO-MCNC: NEGATIVE MG/DL
HCT VFR BLD AUTO: 47.7 % (ref 37–47)
HGB BLD-MCNC: 16.7 G/DL (ref 12–16)
HYALINE CASTS #/AREA URNS LPF: ABNORMAL /LPF
IMM GRANULOCYTES # BLD AUTO: 0.03 K/UL (ref 0–0.11)
IMM GRANULOCYTES NFR BLD AUTO: 0.4 % (ref 0–0.9)
KETONES UR STRIP.AUTO-MCNC: NEGATIVE MG/DL
LEUKOCYTE ESTERASE UR QL STRIP.AUTO: NEGATIVE
LIPASE SERPL-CCNC: 23 U/L (ref 11–82)
LYMPHOCYTES # BLD AUTO: 1.57 K/UL (ref 1–4.8)
LYMPHOCYTES NFR BLD: 21.2 % (ref 22–41)
MCH RBC QN AUTO: 32.2 PG (ref 27–33)
MCHC RBC AUTO-ENTMCNC: 35 G/DL (ref 33.6–35)
MCV RBC AUTO: 91.9 FL (ref 81.4–97.8)
MICRO URNS: ABNORMAL
MONOCYTES # BLD AUTO: 0.56 K/UL (ref 0–0.85)
MONOCYTES NFR BLD AUTO: 7.6 % (ref 0–13.4)
NEUTROPHILS # BLD AUTO: 5.17 K/UL (ref 2–7.15)
NEUTROPHILS NFR BLD: 69.8 % (ref 44–72)
NITRITE UR QL STRIP.AUTO: NEGATIVE
NRBC # BLD AUTO: 0 K/UL
NRBC BLD-RTO: 0 /100 WBC
PH UR STRIP.AUTO: 5.5 [PH] (ref 5–8)
PLATELET # BLD AUTO: 305 K/UL (ref 164–446)
PMV BLD AUTO: 9.4 FL (ref 9–12.9)
POTASSIUM SERPL-SCNC: 3.5 MMOL/L (ref 3.6–5.5)
PROT SERPL-MCNC: 7.9 G/DL (ref 6–8.2)
PROT UR QL STRIP: NEGATIVE MG/DL
RBC # BLD AUTO: 5.19 M/UL (ref 4.2–5.4)
RBC # URNS HPF: ABNORMAL /HPF
RBC UR QL AUTO: ABNORMAL
SODIUM SERPL-SCNC: 139 MMOL/L (ref 135–145)
SP GR UR STRIP.AUTO: 1.02
UROBILINOGEN UR STRIP.AUTO-MCNC: 0.2 MG/DL
WBC # BLD AUTO: 7.4 K/UL (ref 4.8–10.8)
WBC #/AREA URNS HPF: ABNORMAL /HPF

## 2019-08-26 PROCEDURE — 76705 ECHO EXAM OF ABDOMEN: CPT

## 2019-08-26 PROCEDURE — 81001 URINALYSIS AUTO W/SCOPE: CPT

## 2019-08-26 PROCEDURE — 99284 EMERGENCY DEPT VISIT MOD MDM: CPT

## 2019-08-26 PROCEDURE — 80053 COMPREHEN METABOLIC PANEL: CPT

## 2019-08-26 PROCEDURE — 83690 ASSAY OF LIPASE: CPT

## 2019-08-26 PROCEDURE — A9270 NON-COVERED ITEM OR SERVICE: HCPCS | Performed by: EMERGENCY MEDICINE

## 2019-08-26 PROCEDURE — 85025 COMPLETE CBC W/AUTO DIFF WBC: CPT

## 2019-08-26 PROCEDURE — 700102 HCHG RX REV CODE 250 W/ 637 OVERRIDE(OP): Performed by: EMERGENCY MEDICINE

## 2019-08-26 PROCEDURE — 700111 HCHG RX REV CODE 636 W/ 250 OVERRIDE (IP): Performed by: EMERGENCY MEDICINE

## 2019-08-26 RX ORDER — OMEPRAZOLE 20 MG/1
20 CAPSULE, DELAYED RELEASE ORAL DAILY
Qty: 30 CAP | Refills: 0 | Status: SHIPPED | OUTPATIENT
Start: 2019-08-26 | End: 2020-02-23

## 2019-08-26 RX ORDER — ONDANSETRON 4 MG/1
4 TABLET, ORALLY DISINTEGRATING ORAL EVERY 6 HOURS PRN
Qty: 10 TAB | Refills: 0 | Status: SHIPPED | OUTPATIENT
Start: 2019-08-26 | End: 2020-02-23

## 2019-08-26 RX ORDER — FAMOTIDINE 20 MG/1
20 TABLET, FILM COATED ORAL ONCE
Status: COMPLETED | OUTPATIENT
Start: 2019-08-26 | End: 2019-08-26

## 2019-08-26 RX ORDER — ONDANSETRON 4 MG/1
4 TABLET, ORALLY DISINTEGRATING ORAL ONCE
Status: COMPLETED | OUTPATIENT
Start: 2019-08-26 | End: 2019-08-26

## 2019-08-26 RX ADMIN — ONDANSETRON 4 MG: 4 TABLET, ORALLY DISINTEGRATING ORAL at 14:25

## 2019-08-26 RX ADMIN — FAMOTIDINE 20 MG: 20 TABLET ORAL at 14:25

## 2019-08-26 RX ADMIN — LIDOCAINE HYDROCHLORIDE 30 ML: 20 SOLUTION OROPHARYNGEAL at 14:25

## 2019-08-26 NOTE — ED TRIAGE NOTES
.  Chief Complaint   Patient presents with   • Abdominal Pain     burning sensation x 1 week intermittent   • Nausea   ambulated to triage with above c/c. Denies urinary symptoms  Pt given specimen cup instructed on clean catch urine.

## 2019-08-26 NOTE — DISCHARGE INSTRUCTIONS
Follow a healthy bland diet.  You may also use over-the-counter Pepcid.  Call your primary care doctor and arrange office follow-up as soon as possible this week and if symptoms persist you may require referral to a gastroenterologist.  If you develop new or worsening symptoms return here for recheck

## 2019-08-26 NOTE — ED PROVIDER NOTES
"ED Provider Note    CHIEF COMPLAINT  Chief Complaint   Patient presents with   • Abdominal Pain     burning sensation x 1 week intermittent   • Nausea       HPI  Natasha Pino is a 29 y.o. female who presents to the emergency department complaining of epigastric abdominal pain.  This is been going on for 1 week is a burning type of sensation the patient has had 2 episodes of associated emesis, no diarrhea, she notes that she has been belching but really does not feel like she is been experiencing acid reflux.  There are no recognized exacerbating or alleviating factors or precipitating events    REVIEW OF SYSTEMS no black or bloody stool or emesis no chest pain or difficulty breathing no lower abdominal or pelvic pain.  All other systems negative    PAST MEDICAL HISTORY  Past Medical History:   Diagnosis Date   • Hypertension     borderline.   • Kidney disease     kidney stones   • Localization-related partial epilepsy with complex partial seizures (HCC) 5/5/2017   • Migraine without aura and without status migrainosus, not intractable 5/5/2017       FAMILY HISTORY  Family History   Problem Relation Age of Onset   • Cancer Mother         ovarian CA  and cervica CA (n her mid 30s)   • Hyperlipidemia Father    • Heart Attack Father         twice   • Seizures Sister    • GI Disease Sister         \"intestinal problem\"   • Diabetes Maternal Grandmother    • Cancer Maternal Grandmother         breast       SOCIAL HISTORY  Social History     Socioeconomic History   • Marital status: Single     Spouse name: Not on file   • Number of children: Not on file   • Years of education: Not on file   • Highest education level: Not on file   Occupational History   • Not on file   Social Needs   • Financial resource strain: Not on file   • Food insecurity:     Worry: Not on file     Inability: Not on file   • Transportation needs:     Medical: Not on file     Non-medical: Not on file   Tobacco Use   • Smoking status: Never " "Smoker   • Smokeless tobacco: Never Used   Substance and Sexual Activity   • Alcohol use: Yes     Alcohol/week: 0.0 oz     Comment: Socially   • Drug use: Yes     Types: Marijuana   • Sexual activity: Yes     Partners: Male     Birth control/protection: Pill     Comment:    Lifestyle   • Physical activity:     Days per week: Not on file     Minutes per session: Not on file   • Stress: Not on file   Relationships   • Social connections:     Talks on phone: Not on file     Gets together: Not on file     Attends [employer] service: Not on file     Active member of club or organization: Not on file     Attends meetings of clubs or organizations: Not on file     Relationship status: Not on file   • Intimate partner violence:     Fear of current or ex partner: Not on file     Emotionally abused: Not on file     Physically abused: Not on file     Forced sexual activity: Not on file   Other Topics Concern   • Not on file   Social History Narrative   • Not on file       SURGICAL HISTORY  Past Surgical History:   Procedure Laterality Date   • LEEP  2011   • APPENDECTOMY         CURRENT MEDICATIONS  Home Medications     Reviewed by Tash Dorado R.N. (Registered Nurse) on 08/26/19 at 1218  Med List Status: Complete   Medication Last Dose Status   etonogestrel (NEXPLANON) 68 MG Implant implant  Active   ibuprofen (MOTRIN) 200 MG Tab prn Active   zonisamide (ZONEGRAN) 100 MG Cap 8/26/2019 Active                ALLERGIES  Allergies   Allergen Reactions   • Keppra [Levetiracetam] Unspecified     Severe Depression    • Mushroom Extract Complex Anaphylaxis     Pt reports her throat swells       PHYSICAL EXAM  VITAL SIGNS: /83   Pulse 68   Temp 36.8 °C (98.2 °F) (Temporal)   Resp 16   Ht 1.626 m (5' 4\")   Wt 84.5 kg (186 lb 4.6 oz)   LMP 05/22/2019   SpO2 95%   BMI 31.98 kg/m²    Oxygen saturation is interpreted as adequate  Constitutional: Awake verbal nontoxic-appearing  HENT: Mucous membranes are " moist  Eyes: No erythema discharge or jaundice  Neck: Trachea midline no JVD  Cardiovascular: Regular rate and rhythm  Lungs: Clear and equal bilaterally with no apparent difficulty breathing  Abdomen/Back: Soft and nondistended with active bowel sounds the patient's mildly tender in the epigastrium no rebound guarding or peritoneal findings she does not have a classic Pedraza sign.  Skin: Warm and dry  Musculoskeletal: No acute bony deformity  Neurologic: Awake verbal and moving all extremities    Laboratory  CBC shows white blood cell count of 7.4 hemoglobin is adequate at 16.6 basic metabolic panel is unremarkable LFTs within normal limits lipase is normal urinalysis negative for nitrite and leukocyte esterase.    Radiology  US-RUQ   Final Result      1.  Unremarkable right upper quadrant ultrasound.          MEDICAL DECISION MAKING and DISPOSITION  In the emergency department the patient was given oral Zofran and Pepcid and a GI cocktail and these measures were helpful.  The patient is hemodynamically stable and clinically looks well.  I reviewed all the findings with her and at this point in time I think will be safe for her to go home I written prescriptions for Prilosec and Zofran.  The patient is to follow a bland diet and stay well-hydrated.  The patient is to call her primary care doctor and arrange office recheck this week and I have advised her that if symptoms persist her primary care doctor may consider referral to gastroenterologist if appropriate.  If the patient feels she is having new or worsening symptoms she is to return here for recheck    IMPRESSION  1.  Epigastric abdominal pain      Electronically signed by: Pernell Etienne, 8/26/2019 4:07 PM

## 2019-08-26 NOTE — ED NOTES
Pt ambulatory to [facility] with steady gait.  Provided a gown to change into.  Up for ERP evaluation.

## 2019-08-26 NOTE — ED NOTES
Pt given d/c instructions, f/u info and RX x 2 with verbal understanding.  VSS at discharge.  Pt ambulatory from the ED w/ steady gait.  All belongings in possession on discharge.  Pt escorted to the lobby by RN.

## 2019-12-04 ENCOUNTER — TELEPHONE (OUTPATIENT)
Dept: NEUROLOGY | Facility: MEDICAL CENTER | Age: 29
End: 2019-12-04

## 2019-12-04 ENCOUNTER — PATIENT MESSAGE (OUTPATIENT)
Dept: NEUROLOGY | Facility: MEDICAL CENTER | Age: 29
End: 2019-12-04

## 2019-12-04 NOTE — TELEPHONE ENCOUNTER
Chas from patient:    Hello, I am currently taking my Zonegran (Zonisamide) 200mg once a day and my  and I are trying to conceive. I have been reading mixed reviews on Zonegran and pregnancy. Can you please advise if this is safe to use or if I need to schedule an appointment to switch to a safe medication?     Please advise  Would you like an appt?

## 2019-12-04 NOTE — PATIENT COMMUNICATION
Last seen 11/2017 and taking Vimpat under my care.   Needs an appt to continue epilepsy care as I cannot advise.  40 min appt please.

## 2019-12-04 NOTE — TELEPHONE ENCOUNTER
"MyChart from patient:     Hello, I am currently taking my Zonegran (Zonisamide) 200mg once a day and my  and I are trying to conceive. I have been reading mixed reviews on Zonegran and pregnancy. Can you please advise if this is safe to use or if I need to schedule an appointment to switch to a safe medication?      Please advise  Would you like an appt?       Per provider:    \"Last seen 11/2017 and taking Vimpat under my care.   Needs an appt to continue epilepsy care as I cannot advise.  40 min appt please.\"       Replied to patient and stated the above, asked her if Jan 20 at 10AM works for her schedule.   "

## 2020-02-08 ENCOUNTER — APPOINTMENT (OUTPATIENT)
Dept: RADIOLOGY | Facility: IMAGING CENTER | Age: 30
End: 2020-02-08
Attending: FAMILY MEDICINE
Payer: COMMERCIAL

## 2020-02-08 ENCOUNTER — OFFICE VISIT (OUTPATIENT)
Dept: URGENT CARE | Facility: PHYSICIAN GROUP | Age: 30
End: 2020-02-08
Payer: COMMERCIAL

## 2020-02-08 VITALS
SYSTOLIC BLOOD PRESSURE: 120 MMHG | RESPIRATION RATE: 14 BRPM | TEMPERATURE: 100.4 F | HEIGHT: 64 IN | WEIGHT: 180 LBS | OXYGEN SATURATION: 98 % | BODY MASS INDEX: 30.73 KG/M2 | DIASTOLIC BLOOD PRESSURE: 84 MMHG | HEART RATE: 101 BPM

## 2020-02-08 DIAGNOSIS — J10.1 INFLUENZA A: ICD-10-CM

## 2020-02-08 DIAGNOSIS — R06.02 SHORTNESS OF BREATH: ICD-10-CM

## 2020-02-08 DIAGNOSIS — R05.9 COUGH WITH FEVER: ICD-10-CM

## 2020-02-08 DIAGNOSIS — R50.9 COUGH WITH FEVER: ICD-10-CM

## 2020-02-08 LAB
FLUAV+FLUBV AG SPEC QL IA: ABNORMAL
INT CON NEG: ABNORMAL
INT CON NEG: NORMAL
INT CON POS: ABNORMAL
INT CON POS: NORMAL
POC URINE PREGNANCY TEST: NEGATIVE

## 2020-02-08 PROCEDURE — 99214 OFFICE O/P EST MOD 30 MIN: CPT | Performed by: FAMILY MEDICINE

## 2020-02-08 PROCEDURE — 71046 X-RAY EXAM CHEST 2 VIEWS: CPT | Mod: TC | Performed by: FAMILY MEDICINE

## 2020-02-08 PROCEDURE — 81025 URINE PREGNANCY TEST: CPT | Performed by: FAMILY MEDICINE

## 2020-02-08 PROCEDURE — 87804 INFLUENZA ASSAY W/OPTIC: CPT | Performed by: FAMILY MEDICINE

## 2020-02-08 RX ORDER — IBUPROFEN 200 MG
600 TABLET ORAL ONCE
Status: COMPLETED | OUTPATIENT
Start: 2020-02-08 | End: 2020-02-08

## 2020-02-08 RX ORDER — OSELTAMIVIR PHOSPHATE 75 MG/1
75 CAPSULE ORAL 2 TIMES DAILY
Qty: 10 CAP | Refills: 0 | Status: SHIPPED | OUTPATIENT
Start: 2020-02-08 | End: 2020-02-13

## 2020-02-08 RX ORDER — GUAIFENESIN 200 MG/10ML
LIQUID ORAL
COMMUNITY
End: 2020-02-23

## 2020-02-08 RX ADMIN — Medication 600 MG: at 11:39

## 2020-02-08 ASSESSMENT — ENCOUNTER SYMPTOMS
DIARRHEA: 0
EYE DISCHARGE: 0
WEIGHT LOSS: 0
NAUSEA: 0
EYE REDNESS: 0
VOMITING: 0

## 2020-02-08 NOTE — PROGRESS NOTES
"Subjective:      Natasha Pino is a 29 y.o. female who presents with Cough (C/o cough, SOB, fever, ronchi sounds, fatigue/lethargic, fever)            Onset last night productive cough without blood in sputum.  Fever.  Headache.  Myalgia.  Fatigue..  Shortness of breath.  No wheeze.  No known exposures.  Associated nasal congestion and sore throat.  Symptoms are moderate to severe.  Minimal relief with OTC medications.  No other aggravating or alleviating factors.      Review of Systems   Constitutional: Negative for weight loss.   Eyes: Negative for discharge and redness.   Gastrointestinal: Negative for diarrhea, nausea and vomiting.   Musculoskeletal: Negative for joint pain.   Skin: Negative for itching and rash.     .  Medications, Allergies, and current problem list reviewed today in Epic       Objective:     /84 (BP Location: Right arm, Patient Position: Sitting, BP Cuff Size: Large adult)   Pulse (!) 101   Temp 38 °C (100.4 °F) (Temporal)   Resp 14   Ht 1.626 m (5' 4\")   Wt 81.6 kg (180 lb)   SpO2 98%   BMI 30.90 kg/m²      Physical Exam  Constitutional:       General: She is not in acute distress.     Appearance: She is well-developed.   HENT:      Head: Normocephalic and atraumatic.      Right Ear: Tympanic membrane normal.      Left Ear: Tympanic membrane normal.      Nose: Congestion present. No rhinorrhea.      Mouth/Throat:      Mouth: Mucous membranes are moist.      Pharynx: Posterior oropharyngeal erythema present. No oropharyngeal exudate.   Eyes:      Conjunctiva/sclera: Conjunctivae normal.   Neck:      Musculoskeletal: Neck supple.   Cardiovascular:      Rate and Rhythm: Normal rate and regular rhythm.      Heart sounds: Normal heart sounds. No murmur.   Pulmonary:      Effort: Pulmonary effort is normal.      Breath sounds: Rhonchi present. No wheezing.   Lymphadenopathy:      Cervical: No cervical adenopathy.   Skin:     General: Skin is warm and dry.      Findings: No " rash.   Neurological:      Mental Status: She is alert and oriented to person, place, and time.                 Assessment/Plan:   poct influenza +A  CXR: no acute cardiopulmonary process per radiology    1. Cough with fever  POCT Influenza A/B    DX-CHEST-2 VIEWS    POCT Pregnancy    ibuprofen (MOTRIN) tablet 600 mg    Hydrocod Polst-CPM Polst ER (TUSSIONEX) 10-8 MG/5ML Suspension Extended Release   2. Shortness of breath  DX-CHEST-2 VIEWS    POCT Pregnancy   3. Influenza A  oseltamivir (TAMIFLU) 75 MG Cap     Differential diagnosis, natural history, supportive care, and indications for immediate follow-up discussed at length.

## 2020-02-08 NOTE — LETTER
February 8, 2020         Patient: Natasha Pino   YOB: 1990   Date of Visit: 2/8/2020           To Whom it May Concern:    Natasha Pino was seen in my clinic on 2/8/2020. Please excuse 2/10 and 2/11/2020.      Sincerely,           Jean-Pierre Salvador M.D.  Electronically Signed

## 2020-02-23 ENCOUNTER — HOSPITAL ENCOUNTER (EMERGENCY)
Facility: MEDICAL CENTER | Age: 30
End: 2020-02-23
Attending: EMERGENCY MEDICINE
Payer: COMMERCIAL

## 2020-02-23 VITALS
OXYGEN SATURATION: 97 % | DIASTOLIC BLOOD PRESSURE: 78 MMHG | HEART RATE: 89 BPM | SYSTOLIC BLOOD PRESSURE: 121 MMHG | WEIGHT: 182 LBS | BODY MASS INDEX: 31.07 KG/M2 | RESPIRATION RATE: 17 BRPM | TEMPERATURE: 98 F | HEIGHT: 64 IN

## 2020-02-23 DIAGNOSIS — R55 NEAR SYNCOPE: ICD-10-CM

## 2020-02-23 DIAGNOSIS — R42 DIZZINESS: ICD-10-CM

## 2020-02-23 LAB
ALBUMIN SERPL BCP-MCNC: 4.5 G/DL (ref 3.2–4.9)
ALBUMIN/GLOB SERPL: 1.5 G/DL
ALP SERPL-CCNC: 55 U/L (ref 30–99)
ALT SERPL-CCNC: 24 U/L (ref 2–50)
ANION GAP SERPL CALC-SCNC: 12 MMOL/L (ref 0–11.9)
APPEARANCE UR: CLEAR
AST SERPL-CCNC: 16 U/L (ref 12–45)
BASOPHILS # BLD AUTO: 0.5 % (ref 0–1.8)
BASOPHILS # BLD: 0.03 K/UL (ref 0–0.12)
BILIRUB SERPL-MCNC: 0.5 MG/DL (ref 0.1–1.5)
BILIRUB UR QL STRIP.AUTO: NEGATIVE
BUN SERPL-MCNC: 15 MG/DL (ref 8–22)
CALCIUM SERPL-MCNC: 9.5 MG/DL (ref 8.5–10.5)
CHLORIDE SERPL-SCNC: 104 MMOL/L (ref 96–112)
CO2 SERPL-SCNC: 22 MMOL/L (ref 20–33)
COLOR UR: YELLOW
CREAT SERPL-MCNC: 0.9 MG/DL (ref 0.5–1.4)
EKG IMPRESSION: NORMAL
EOSINOPHIL # BLD AUTO: 0.11 K/UL (ref 0–0.51)
EOSINOPHIL NFR BLD: 1.7 % (ref 0–6.9)
ERYTHROCYTE [DISTWIDTH] IN BLOOD BY AUTOMATED COUNT: 40.3 FL (ref 35.9–50)
ETHANOL BLD-MCNC: 0 G/DL
GLOBULIN SER CALC-MCNC: 3 G/DL (ref 1.9–3.5)
GLUCOSE SERPL-MCNC: 111 MG/DL (ref 65–99)
GLUCOSE UR STRIP.AUTO-MCNC: NEGATIVE MG/DL
HCG SERPL QL: NEGATIVE
HCT VFR BLD AUTO: 46 % (ref 37–47)
HGB BLD-MCNC: 16.1 G/DL (ref 12–16)
IMM GRANULOCYTES # BLD AUTO: 0.04 K/UL (ref 0–0.11)
IMM GRANULOCYTES NFR BLD AUTO: 0.6 % (ref 0–0.9)
KETONES UR STRIP.AUTO-MCNC: NEGATIVE MG/DL
LEUKOCYTE ESTERASE UR QL STRIP.AUTO: NEGATIVE
LYMPHOCYTES # BLD AUTO: 1.74 K/UL (ref 1–4.8)
LYMPHOCYTES NFR BLD: 26.4 % (ref 22–41)
MAGNESIUM SERPL-MCNC: 1.8 MG/DL (ref 1.5–2.5)
MCH RBC QN AUTO: 31.6 PG (ref 27–33)
MCHC RBC AUTO-ENTMCNC: 35 G/DL (ref 33.6–35)
MCV RBC AUTO: 90.2 FL (ref 81.4–97.8)
MICRO URNS: NORMAL
MONOCYTES # BLD AUTO: 0.68 K/UL (ref 0–0.85)
MONOCYTES NFR BLD AUTO: 10.3 % (ref 0–13.4)
NEUTROPHILS # BLD AUTO: 4 K/UL (ref 2–7.15)
NEUTROPHILS NFR BLD: 60.5 % (ref 44–72)
NITRITE UR QL STRIP.AUTO: NEGATIVE
NRBC # BLD AUTO: 0 K/UL
NRBC BLD-RTO: 0 /100 WBC
PH UR STRIP.AUTO: 6.5 [PH] (ref 5–8)
PLATELET # BLD AUTO: 365 K/UL (ref 164–446)
PMV BLD AUTO: 9.4 FL (ref 9–12.9)
POTASSIUM SERPL-SCNC: 3.6 MMOL/L (ref 3.6–5.5)
PROT SERPL-MCNC: 7.5 G/DL (ref 6–8.2)
PROT UR QL STRIP: NEGATIVE MG/DL
RBC # BLD AUTO: 5.1 M/UL (ref 4.2–5.4)
RBC UR QL AUTO: NEGATIVE
SODIUM SERPL-SCNC: 138 MMOL/L (ref 135–145)
SP GR UR STRIP.AUTO: 1
TROPONIN T SERPL-MCNC: <6 NG/L (ref 6–19)
UROBILINOGEN UR STRIP.AUTO-MCNC: 0.2 MG/DL
WBC # BLD AUTO: 6.6 K/UL (ref 4.8–10.8)

## 2020-02-23 PROCEDURE — 81003 URINALYSIS AUTO W/O SCOPE: CPT

## 2020-02-23 PROCEDURE — 83735 ASSAY OF MAGNESIUM: CPT

## 2020-02-23 PROCEDURE — 93005 ELECTROCARDIOGRAM TRACING: CPT | Performed by: EMERGENCY MEDICINE

## 2020-02-23 PROCEDURE — 93005 ELECTROCARDIOGRAM TRACING: CPT

## 2020-02-23 PROCEDURE — 99285 EMERGENCY DEPT VISIT HI MDM: CPT

## 2020-02-23 PROCEDURE — 84703 CHORIONIC GONADOTROPIN ASSAY: CPT

## 2020-02-23 PROCEDURE — 85025 COMPLETE CBC W/AUTO DIFF WBC: CPT

## 2020-02-23 PROCEDURE — 80307 DRUG TEST PRSMV CHEM ANLYZR: CPT

## 2020-02-23 PROCEDURE — 94760 N-INVAS EAR/PLS OXIMETRY 1: CPT

## 2020-02-23 PROCEDURE — 700111 HCHG RX REV CODE 636 W/ 250 OVERRIDE (IP): Performed by: EMERGENCY MEDICINE

## 2020-02-23 PROCEDURE — 36415 COLL VENOUS BLD VENIPUNCTURE: CPT

## 2020-02-23 PROCEDURE — 80053 COMPREHEN METABOLIC PANEL: CPT

## 2020-02-23 PROCEDURE — 84484 ASSAY OF TROPONIN QUANT: CPT

## 2020-02-23 PROCEDURE — 96374 THER/PROPH/DIAG INJ IV PUSH: CPT

## 2020-02-23 PROCEDURE — 700105 HCHG RX REV CODE 258: Performed by: EMERGENCY MEDICINE

## 2020-02-23 RX ORDER — ONDANSETRON 2 MG/ML
4 INJECTION INTRAMUSCULAR; INTRAVENOUS ONCE
Status: COMPLETED | OUTPATIENT
Start: 2020-02-23 | End: 2020-02-23

## 2020-02-23 RX ORDER — SODIUM CHLORIDE, SODIUM LACTATE, POTASSIUM CHLORIDE, CALCIUM CHLORIDE 600; 310; 30; 20 MG/100ML; MG/100ML; MG/100ML; MG/100ML
1000 INJECTION, SOLUTION INTRAVENOUS ONCE
Status: COMPLETED | OUTPATIENT
Start: 2020-02-23 | End: 2020-02-23

## 2020-02-23 RX ADMIN — SODIUM CHLORIDE, POTASSIUM CHLORIDE, SODIUM LACTATE AND CALCIUM CHLORIDE 1000 ML: 600; 310; 30; 20 INJECTION, SOLUTION INTRAVENOUS at 01:34

## 2020-02-23 RX ADMIN — ONDANSETRON 4 MG: 2 INJECTION INTRAMUSCULAR; INTRAVENOUS at 01:34

## 2020-02-23 NOTE — ED TRIAGE NOTES
"Chief Complaint   Patient presents with   • Dizziness     pt informs she was watching tv at a friends house when she had a sudden onset of dizziness and near syncopal. Pt informs it is worse when she closes her eyes. Per ems, they did not get orthostatics, but inform when she stood up her heart rate increased.   • Alcohol Intoxication     pt admits to having jello and vodka shots along with truly drinks today. approximately 10 drinks total.      /87   Pulse (!) 102   Temp 36.7 °C (98 °F) (Temporal)   Resp 17   Ht 1.626 m (5' 4\")   Wt 82.6 kg (182 lb)   LMP 02/19/2020 (Exact Date)   SpO2 97%   BMI 31.24 kg/m²     Pt has hx of seizure and stopped her medication approximately 4 months ago with physician acknowledgement. Pt has auras and informs that this feels nothing like a possible impeding seizure. Erp to see    Chart up for eval.    "

## 2020-02-23 NOTE — ED NOTES
Labs drawn and sent, patient medicated per MAR, fluids started, and pt given warm blankets. Patient unable to provide urine sample at this time.

## 2020-02-23 NOTE — ED PROVIDER NOTES
ED Provider Note    CHIEF COMPLAINT  Chief Complaint   Patient presents with   • Dizziness     pt informs she was watching tv at a friends house when she had a sudden onset of dizziness and near syncopal. Pt informs it is worse when she closes her eyes. Per ems, they did not get orthostatics, but inform when she stood up her heart rate increased.   • Alcohol Intoxication     pt admits to having jello and vodka shots along with truly drinks today. approximately 10 drinks total.         [facility]    Primary care provider: Ata Sky M.D.   History obtained from: Patient and significant other  History limited by: None     Natasha Pino is a 29 y.o. female who presents to the ED by EMS complaining of near syncopal episode tonight shortly prior to arrival.  She was with some friends celebrating her recent job promotion and had about 10 alcoholic drinks tonight.  Patient states that she was watching music videos on TV when she got up and felt really lightheaded and laid back down.  She denies actual syncope.  She denies any injury or trauma.  She reports feeling dizzy with any head movement or eye movement or with standing up.  She denies any pain except for mild lower abdominal discomfort because she feels like she needs to urinate at this time.  She reports feeling fine earlier today without any fever.  She reports being positive for influenza A 2 weeks ago and still has some lingering congestion and cough but overall is much improved.  She had some nausea tonight with her symptoms but no vomiting.  She has been slightly constipated for the past 2 days.  She denies dysuria.  She denies any possibility of pregnancy.  She denies recent foreign travels or ill contacts.  She denies shortness of breath/difficulty breathing/focal weakness or sensory change/visual or hearing change.    REVIEW OF SYSTEMS  Please see HPI for pertinent positives/negatives.  All other systems reviewed and are negative.     PAST  "MEDICAL HISTORY  Past Medical History:   Diagnosis Date   • Localization-related partial epilepsy with complex partial seizures (HCC) 5/5/2017   • Migraine without aura and without status migrainosus, not intractable 5/5/2017   • Hypertension     borderline.   • Kidney disease     kidney stones        SURGICAL HISTORY  Past Surgical History:   Procedure Laterality Date   • LEEP  2011   • APPENDECTOMY          SOCIAL HISTORY  Social History     Tobacco Use   • Smoking status: Never Smoker   • Smokeless tobacco: Never Used   Substance and Sexual Activity   • Alcohol use: Yes     Alcohol/week: 0.0 oz     Comment: Socially   • Drug use: Yes     Types: Marijuana   • Sexual activity: Yes     Partners: Male     Birth control/protection: Pill     Comment:         FAMILY HISTORY  Family History   Problem Relation Age of Onset   • Cancer Mother         ovarian CA  and cervica CA (n her mid 30s)   • Hyperlipidemia Father    • Heart Attack Father         twice   • Seizures Sister    • GI Disease Sister         \"intestinal problem\"   • Diabetes Maternal Grandmother    • Cancer Maternal Grandmother         breast        CURRENT MEDICATIONS  Home Medications     Reviewed by Mel Coughlin R.N. (Registered Nurse) on 02/23/20 at 0102  Med List Status: Complete   Medication Last Dose Status   ibuprofen (MOTRIN) 200 MG Tab  Active                 ALLERGIES  Allergies   Allergen Reactions   • Keppra [Levetiracetam] Unspecified     Severe Depression    • Mushroom Extract Complex Anaphylaxis     Pt reports her throat swells        PHYSICAL EXAM  VITAL SIGNS: /78   Pulse 89   Temp 36.7 °C (98 °F) (Temporal)   Resp 17   Ht 1.626 m (5' 4\")   Wt 82.6 kg (182 lb)   LMP 02/19/2020 (Exact Date)   SpO2 97%   BMI 31.24 kg/m²  @ORTIZ[[id number]::@     Pulse ox interpretation: 97% I interpret this pulse ox as normal     Cardiac monitor interpretation: Sinus tachycardia with heart rate in the 100s as interpreted by me.  The patient " presented with near syncope and dizziness and cardiac monitor was ordered to monitor for dysrhythmia.    Constitutional: Well developed, well nourished, alert in no apparent distress, nontoxic appearance    HENT: No external signs of trauma, normocephalic, EACs and TMs are clear bilaterally, oropharynx moist and clear, nose normal    Eyes: PERRL, EOMI, vision visual fields are grossly intact bilaterally, conjunctiva without erythema, no discharge, no icterus    Neck: Soft and supple, trachea midline, no stridor, no tenderness, no LAD, no JVD, good ROM    Cardiovascular: Tachycardia, no murmurs/rubs/gallops, strong distal pulses and good perfusion    Thorax & Lungs: No respiratory distress, CTAB   Abdomen: Soft, nontender, nondistended, no guarding, no rebound, normal BS    Back: No CVAT    Extremities: No cyanosis, no edema, no gross deformity, good ROM, intact distal pulses with brisk cap refill    Skin: Warm, dry, no pallor/cyanosis, no rash noted    Lymphatic: No lymphadenopathy noted    Neuro: Alert and oriented to person, place, and time.  GCS 15.  CN II-XII grossly intact.  Normal speech.  Equal strength bilateral UE/LE.  Sensation intact to touch.  No cerebellar signs.  Psychiatric: Cooperative, normal mood and affect, normal judgement, appropriate for clinical situation        DIAGNOSTIC STUDIES / PROCEDURES    EKG  12 Lead EKG obtained at 0056 and interpreted by me:   Rate: 100   Rhythm: Sinus rhythm   Ectopy: None  Intervals: Normal   Axis: Normal   QRS: Normal   ST segments: Minimal ST depression in inferior and lateral leads  T Waves: Normal    Clinical Impression: Sinus tachycardia with nonspecific ST changes       LABS  All labs reviewed by me.     Results for orders placed or performed during the hospital encounter of 02/23/20   CBC WITH DIFFERENTIAL   Result Value Ref Range    WBC 6.6 4.8 - 10.8 K/uL    RBC 5.10 4.20 - 5.40 M/uL    Hemoglobin 16.1 (H) 12.0 - 16.0 g/dL    Hematocrit 46.0 37.0 - 47.0 %     MCV 90.2 81.4 - 97.8 fL    MCH 31.6 27.0 - 33.0 pg    MCHC 35.0 33.6 - 35.0 g/dL    RDW 40.3 35.9 - 50.0 fL    Platelet Count 365 164 - 446 K/uL    MPV 9.4 9.0 - 12.9 fL    Neutrophils-Polys 60.50 44.00 - 72.00 %    Lymphocytes 26.40 22.00 - 41.00 %    Monocytes 10.30 0.00 - 13.40 %    Eosinophils 1.70 0.00 - 6.90 %    Basophils 0.50 0.00 - 1.80 %    Immature Granulocytes 0.60 0.00 - 0.90 %    Nucleated RBC 0.00 /100 WBC    Neutrophils (Absolute) 4.00 2.00 - 7.15 K/uL    Lymphs (Absolute) 1.74 1.00 - 4.80 K/uL    Monos (Absolute) 0.68 0.00 - 0.85 K/uL    Eos (Absolute) 0.11 0.00 - 0.51 K/uL    Baso (Absolute) 0.03 0.00 - 0.12 K/uL    Immature Granulocytes (abs) 0.04 0.00 - 0.11 K/uL    NRBC (Absolute) 0.00 K/uL   COMP METABOLIC PANEL   Result Value Ref Range    Sodium 138 135 - 145 mmol/L    Potassium 3.6 3.6 - 5.5 mmol/L    Chloride 104 96 - 112 mmol/L    Co2 22 20 - 33 mmol/L    Anion Gap 12.0 (H) 0.0 - 11.9    Glucose 111 (H) 65 - 99 mg/dL    Bun 15 8 - 22 mg/dL    Creatinine 0.90 0.50 - 1.40 mg/dL    Calcium 9.5 8.5 - 10.5 mg/dL    AST(SGOT) 16 12 - 45 U/L    ALT(SGPT) 24 2 - 50 U/L    Alkaline Phosphatase 55 30 - 99 U/L    Total Bilirubin 0.5 0.1 - 1.5 mg/dL    Albumin 4.5 3.2 - 4.9 g/dL    Total Protein 7.5 6.0 - 8.2 g/dL    Globulin 3.0 1.9 - 3.5 g/dL    A-G Ratio 1.5 g/dL   TROPONIN   Result Value Ref Range    Troponin T <6 6 - 19 ng/L   MAGNESIUM   Result Value Ref Range    Magnesium 1.8 1.5 - 2.5 mg/dL   DIAGNOSTIC ALCOHOL   Result Value Ref Range    Diagnostic Alcohol 0.00 0.00 g/dL   BETA-HCG QUALITATIVE SERUM   Result Value Ref Range    Beta-Hcg Qualitative Serum Negative Negative   URINALYSIS CULTURE, IF INDICATED   Result Value Ref Range    Color Yellow     Character Clear     Specific Gravity 1.003 <1.035    Ph 6.5 5.0 - 8.0    Glucose Negative Negative mg/dL    Ketones Negative Negative mg/dL    Protein Negative Negative mg/dL    Bilirubin Negative Negative    Urobilinogen, Urine 0.2 Negative     Nitrite Negative Negative    Leukocyte Esterase Negative Negative    Occult Blood Negative Negative    Micro Urine Req see below    ESTIMATED GFR   Result Value Ref Range    GFR If African American >60 >60 mL/min/1.73 m 2    GFR If Non African American >60 >60 mL/min/1.73 m 2   EKG (NOW)   Result Value Ref Range    Report       [facility] Emergency Dept.    Test Date:  2020  Pt Name:    MERLY LEAL           Department: ER  MRN:        [id number]                      Room:        22  Gender:     Female                       Technician: [id number]  :        1990                   Requested By:ER TRIAGE PROTOCOL  Order #:    [id number]                    Reading MD:    Measurements  Intervals                                Axis  Rate:       100                          P:          25  AZ:         140                          QRS:        40  QRSD:       82                           T:          -2  QT:         364  QTc:        470    Interpretive Statements  SINUS TACHYCARDIA  NONSPECIFIC T ABNORMALITIES, INFERIOR LEADS  No previous ECG available for comparison          RADIOLOGY  The radiologist's interpretation of all radiological studies have been reviewed by me.     No orders to display          COURSE & MEDICAL DECISION MAKING  Nursing notes, VS, PMSFHx reviewed in chart.     Review of past medical records shows the patient was last seen in this ED 2019 for abdominal pain and nausea.  She was seen at urgent care on 2020 for cough and was positive influenza A and prescribed Tamiflu.      Differential diagnoses considered include but are not limited to: Syncope, near syncope, hypoglycemia, Sz, vasovagal episode, dysrhythmia, dehydration, electrolyte abnormality      History and physical exam as above.  EKG showed sinus tachycardia with nonspecific ST changes.  Labs are fairly unremarkable.  Suspect the negative alcohol level is a lab error.  Patient reports  that she had multiple alcoholic drinks tonight and opened the cans herself and also poured her own drinks.  She received IV fluid and Zofran with improvement of her symptoms.  She tolerated oral fluids without difficulty.  I discussed the findings with the patient and patient reports feeling much better.  She is now noted to be in no acute distress and nontoxic in appearance.  She was able to ambulate unassisted without difficulty.  Low clinical suspicion at this time for acute serious pathology such as CVA, AMI, worrisome dysrhythmia given the history/exam/findings.  Discussed with patient worrisome signs and symptoms and return to ED precautions.  She was advised on hydration and rest.  She verbalized understanding and agreed with plan of care with no further questions or concerns.       HYDRATION: Based on the patient's presentation of Tachycardia the patient was given IV fluids. IV Hydration was used because oral hydration was not as rapid as required. Upon recheck following hydration, the patient was improved.      The patient is referred to a primary physician for blood pressure management, diabetic screening, and for all other preventative health concerns.       FINAL IMPRESSION  1. Near syncope Acute   2. Dizziness Acute          DISPOSITION  Patient will be discharged home in stable condition.       FOLLOW UP  Ata Sky M.D.  [address]  [address]  [facility] [address]  [phone]    Call in 1 day      [facility], Emergency Dept  [address] [address] [phone]    If symptoms worsen         OUTPATIENT MEDICATIONS  Discharge Medication List as of 2/23/2020  3:41 AM             Electronically signed by: Joel Lopez D.O., 2/23/2020 1:09 AM      Portions of this record were made with voice recognition software.  Despite my review, spelling/grammar/context errors may still remain.  Interpretation of this chart should be taken in this context.

## 2020-02-23 NOTE — ED NOTES
Pt verbalizes understanding of discharge and follow-up instructions.  PIV removed.  Given Rx X0.  VSS.  All questions answered.  Ambulates to discharge with slow gait.

## 2020-02-23 NOTE — ED NOTES
Patient assisted to commode, complains of dizziness while ambulating. UA obtained and sent. No other needs at this time.

## 2020-08-10 ENCOUNTER — OFFICE VISIT (OUTPATIENT)
Dept: URGENT CARE | Facility: PHYSICIAN GROUP | Age: 30
End: 2020-08-10
Payer: COMMERCIAL

## 2020-08-10 ENCOUNTER — HOSPITAL ENCOUNTER (OUTPATIENT)
Facility: MEDICAL CENTER | Age: 30
End: 2020-08-10
Attending: PHYSICIAN ASSISTANT
Payer: COMMERCIAL

## 2020-08-10 VITALS
WEIGHT: 186.2 LBS | RESPIRATION RATE: 20 BRPM | TEMPERATURE: 98.8 F | BODY MASS INDEX: 31.79 KG/M2 | DIASTOLIC BLOOD PRESSURE: 78 MMHG | SYSTOLIC BLOOD PRESSURE: 120 MMHG | HEIGHT: 64 IN | HEART RATE: 72 BPM | OXYGEN SATURATION: 97 %

## 2020-08-10 DIAGNOSIS — Z20.822 SUSPECTED COVID-19 VIRUS INFECTION: ICD-10-CM

## 2020-08-10 DIAGNOSIS — R05.9 COUGH: ICD-10-CM

## 2020-08-10 LAB — COVID ORDER STATUS COVID19: NORMAL

## 2020-08-10 PROCEDURE — U0003 INFECTIOUS AGENT DETECTION BY NUCLEIC ACID (DNA OR RNA); SEVERE ACUTE RESPIRATORY SYNDROME CORONAVIRUS 2 (SARS-COV-2) (CORONAVIRUS DISEASE [COVID-19]), AMPLIFIED PROBE TECHNIQUE, MAKING USE OF HIGH THROUGHPUT TECHNOLOGIES AS DESCRIBED BY CMS-2020-01-R: HCPCS

## 2020-08-10 PROCEDURE — 99214 OFFICE O/P EST MOD 30 MIN: CPT | Performed by: PHYSICIAN ASSISTANT

## 2020-08-10 RX ORDER — ALBUTEROL SULFATE 90 UG/1
2 AEROSOL, METERED RESPIRATORY (INHALATION) EVERY 6 HOURS PRN
Qty: 8.5 G | Refills: 0 | Status: SHIPPED | OUTPATIENT
Start: 2020-08-10 | End: 2021-03-04

## 2020-08-10 ASSESSMENT — ENCOUNTER SYMPTOMS
HEADACHES: 0
FEVER: 0
SHORTNESS OF BREATH: 1
SPUTUM PRODUCTION: 1
MYALGIAS: 0
DIARRHEA: 1
PALPITATIONS: 0
COUGH: 1
CHILLS: 0
SINUS PAIN: 1
VOMITING: 0
NAUSEA: 0
SORE THROAT: 0
DIZZINESS: 0
ABDOMINAL PAIN: 1
EYE PAIN: 0
CONSTIPATION: 1
BLOOD IN STOOL: 0

## 2020-08-10 ASSESSMENT — FIBROSIS 4 INDEX: FIB4 SCORE: 0.28

## 2020-08-10 NOTE — PATIENT INSTRUCTIONS
Viral syndrome and Novel Coronavirus (COVID-19)       You have a viral syndrome which may include symptoms like muscle aches, fevers, chills, runny nose, cough, sneezing, sore throat, vomiting or diarrhea.  One of the potential viruses you may have is SARSCoV-2, the virus that causes COVID-19, also known as the novel coronavirus.  You may be just as likely to have a different viral infection such as the common cold or flu.  Most patients with COVID -19 have mild symptoms and recover on their own. Resting, staying hydrated, and sleeping are typically helpful.  As of today's visit, you are well enough to go home and treat your symptoms with oral fluids, medicines for fevers, cough, pain, etc.        COVID 19 testing is not performed on most people with mild symptoms who are being discharged from the Emergency Department or Clinic.      If COVID 19 testing was performed, the results will not be available for up to 4 days.  Please DO NOT CONTACT THE EMERGENCY DEPARTMENT OR CLINIC FOR RESULTS OF THIS TEST.       You will be contacted by a member of the [facility] team with your results and for further discussion.       Please follow the precautions below:      • Stay home except to get medical care.   • As advised by the Centers for Disease Control and Prevention (CDC), we recommend you stay in your home and minimize contact with others to avoid spreading this infection.   • The elderly or anyone with significant medical issues may have more severe symptoms from this infection. We recommend separation, also known as self-isolation, for at least 7 days after your first day of symptoms and several more after that if you are still sick. The most important action is wait for at least  a week and several more days after you feel well before returning to you regular activities, work or school. If you become sicker, like difficulty breathing, chest pain, you are unable to eat or drink enough, or have severe vomiting,  "diarrhea or weakness, you may need to return to the Emergency Department or contact your clinic provider for re-evaluation.    • You should restrict activities outside your home, except for getting medical care. Do not go to work, school, or public areas. Avoid using public transportation, ride-sharing, or taxis.   • Separate yourself from other people and animals in your home.   • As much as possible, you should stay in a specific room and away from other people in your home. Also, you should use a separate bathroom, if available.   • Avoid sharing personal household items. You should not share dishes, drinking glasses, cups, eating utensils, towels, or bedding with other people in your home. After using these items, they should be washed thoroughly with soap and water.         Precautions continued:    • Clean all \"high-touch\" surfaces every day high touch surfaces include counters, tabletops, doorknobs, bathroom fixtures, toilets, phones, keyboards, tablets, and bedside tables. Also, clean any surfaces that may have blood, stool, or body fluids on them. Use a household cleaning   spray or wipe, according to the label instructions. Labels contain instructions for safe and effective use of the cleaning product including precautions you should take when applying the product, such as wearing gloves and making sure you have good ventilation during use of the product.   • Clean your hands often. Wash your hands often with soap and water for at least 20 seconds. If soap and water are not available, clean your hands with an alcohol-based hand  that contains at least 60% alcohol, covering all surfaces of your hands and rubbing them together until they feel dry. Soap and water should be used preferentially if hands are visibly dirty. Avoid touching your eyes, nose, and mouth with unwashed hands.   • Cover your coughs and sneezes    • Cover your mouth and nose with a tissue when you cough or sneeze   • Throw used " tissues in a lined trash can; immediately wash your hands with soap and water for at least 20 seconds or clean your hands with an alcohol-based hand  that contains at least 60 to 95% alcohol, covering all surfaces of your hands and rubbing them together until they feel dry. Soap and water should be used preferentially if hands are visibly dirty.     • When seeking care at a healthcare facility:    · Seek prompt medical attention if your illness is worsening (e.g., difficulty breathing).    · Put on a facemask before you enter the facility.    · These steps will help the healthcare provider's office to keep other people in the office or waiting room from getting infected or exposed.    · If possible, put on a facemask before emergency medical services arrive.      Please see the resources below for more information.      CDC Corona Website https://www.cdc.gov/coronavirus/2019-ncov/index.html    General Information https://www.cdc.gov/coronavirus/2019-ncov/faq.html      [facility]: [phone]     [facility] Health Line [phone]

## 2020-08-10 NOTE — LETTER
August 10, 2020    To Whom It May Concern:         This is confirmation that Natasha Pino attended her scheduled appointment with Felice Kyle P.A.-C. on 8/10/20.   Your employee was seen in our clinic today.  A concern for COVID-19 has been identified and testing is in progress.     We are asking you to excuse absences while following self-isolation protocol per Center for Disease Control (CDC) guidelines.  Your employee will be able to access test results through our electronic delivery system called [employer].     If the results of testing are negative, and once there has been no fever (temperature >100.4 F) for at least 72 hours without treatment, and no vomiting or diarrhea for at least 48 hours, then return to work is approved.    If the results of testing are positive then your employee will be contacted by the [facility] or [facility] for further instructions on duration of self-isolation and return to work protocol. In general, this will also follow the CDC guidelines with a minimum of 10 days from the onset of symptoms and without fever, vomiting, or diarrhea as above.     In general, repeat testing is not necessary and not offered through our [facility].     This is the only note that will be provided from [facility] for this visit.  Your employee will require an appointment with a primary care provider if FMLA or disability forms are required.         If you have any questions please do not hesitate to call me at the phone number listed below.    Sincerely,          Felice Kyle P.A.-C.  [phone]

## 2020-08-11 LAB
SARS-COV-2 RNA RESP QL NAA+PROBE: NOTDETECTED
SPECIMEN SOURCE: NORMAL

## 2020-08-14 NOTE — RESULT ENCOUNTER NOTE
"The patient's covid results came back as \"Not Detected\" which would indicate the patient's symptoms are not a result of the novel coronavirus covid-19.  During our patient visit I discussed with the patient the likelihood of a false negative as well as supportive care.  We agree that I would contact the patient via telephone if the results were POSITIVE, and I would notify them via [employer] if the results were NEGATIVE.  I will therefore send them a message via [employer]."

## 2020-11-05 ENCOUNTER — HOSPITAL ENCOUNTER (OUTPATIENT)
Dept: LAB | Facility: MEDICAL CENTER | Age: 30
End: 2020-11-05
Attending: OBSTETRICS & GYNECOLOGY
Payer: COMMERCIAL

## 2020-11-05 LAB
B-HCG SERPL-ACNC: 71.4 MIU/ML (ref 0–5)
HCG SERPL QL: POSITIVE

## 2020-11-05 PROCEDURE — 36415 COLL VENOUS BLD VENIPUNCTURE: CPT

## 2020-11-05 PROCEDURE — 84703 CHORIONIC GONADOTROPIN ASSAY: CPT

## 2020-11-05 PROCEDURE — 84702 CHORIONIC GONADOTROPIN TEST: CPT

## 2020-11-18 ENCOUNTER — HOSPITAL ENCOUNTER (OUTPATIENT)
Facility: MEDICAL CENTER | Age: 30
End: 2020-11-18
Attending: PHYSICIAN ASSISTANT
Payer: COMMERCIAL

## 2020-11-18 ENCOUNTER — OFFICE VISIT (OUTPATIENT)
Dept: URGENT CARE | Facility: PHYSICIAN GROUP | Age: 30
End: 2020-11-18
Payer: COMMERCIAL

## 2020-11-18 VITALS
RESPIRATION RATE: 12 BRPM | OXYGEN SATURATION: 95 % | HEART RATE: 76 BPM | HEIGHT: 64 IN | DIASTOLIC BLOOD PRESSURE: 82 MMHG | WEIGHT: 188 LBS | TEMPERATURE: 98.2 F | SYSTOLIC BLOOD PRESSURE: 120 MMHG | BODY MASS INDEX: 32.1 KG/M2

## 2020-11-18 DIAGNOSIS — Z20.822 EXPOSURE TO COVID-19 VIRUS: ICD-10-CM

## 2020-11-18 PROCEDURE — U0003 INFECTIOUS AGENT DETECTION BY NUCLEIC ACID (DNA OR RNA); SEVERE ACUTE RESPIRATORY SYNDROME CORONAVIRUS 2 (SARS-COV-2) (CORONAVIRUS DISEASE [COVID-19]), AMPLIFIED PROBE TECHNIQUE, MAKING USE OF HIGH THROUGHPUT TECHNOLOGIES AS DESCRIBED BY CMS-2020-01-R: HCPCS

## 2020-11-18 PROCEDURE — 99214 OFFICE O/P EST MOD 30 MIN: CPT | Mod: CS | Performed by: PHYSICIAN ASSISTANT

## 2020-11-18 ASSESSMENT — ENCOUNTER SYMPTOMS
ORTHOPNEA: 0
ABDOMINAL PAIN: 0
SEIZURES: 0
MYALGIAS: 1
BLURRED VISION: 0
FEVER: 1
HEADACHES: 0
PALPITATIONS: 0
FOCAL WEAKNESS: 0
WEAKNESS: 0
DIARRHEA: 0
COUGH: 0
SPEECH CHANGE: 0
CHILLS: 0
DOUBLE VISION: 0
SHORTNESS OF BREATH: 0
NAUSEA: 0
TINGLING: 0
TREMORS: 0
CLAUDICATION: 0
LOSS OF CONSCIOUSNESS: 0
DIZZINESS: 0
SENSORY CHANGE: 0
VOMITING: 0

## 2020-11-18 ASSESSMENT — FIBROSIS 4 INDEX: FIB4 SCORE: 0.28

## 2020-11-18 NOTE — PROGRESS NOTES
Subjective:   Natasha Pino is a 30 y.o. female who presents for Fever (exp to COVID, mild fever body aches some SOB, burning sensation in chest)      Fever   This is a new problem. The current episode started in the past 7 days. The problem occurs constantly. The problem has been unchanged. Pertinent negatives include no abdominal pain, chest pain, coughing, diarrhea, headaches, nausea, rash or vomiting. She has tried nothing for the symptoms.       Review of Systems   Constitutional: Positive for fever and malaise/fatigue. Negative for chills.   Eyes: Negative for blurred vision and double vision.   Respiratory: Negative for cough and shortness of breath.    Cardiovascular: Negative for chest pain, palpitations, orthopnea, claudication and leg swelling.   Gastrointestinal: Negative for abdominal pain, diarrhea, nausea and vomiting.   Musculoskeletal: Positive for myalgias.   Skin: Negative for rash.   Neurological: Negative for dizziness, tingling, tremors, sensory change, speech change, focal weakness, seizures, loss of consciousness, weakness and headaches.   All other systems reviewed and are negative.      Medications:    • albuterol Aers  • ibuprofen Tabs    Allergies: Keppra [levetiracetam] and Mushroom extract complex    Problem List: Natasha Pino has Obesity (BMI 30-39.9); Uses birth control-OCPs; Syncope and collapse; Localization-related partial epilepsy with complex partial seizures (HCC); Migraine without aura and without status migrainosus, not intractable; Severe single current episode of major depressive disorder (HCC); Elevated fasting glucose; and Family history of diabetes mellitus on their problem list.    Surgical History:  Past Surgical History:   Procedure Laterality Date   • LEEP  2011   • APPENDECTOMY         Past Social Hx: Natasha Pino  reports that she has never smoked. She has never used smokeless tobacco. She reports previous alcohol use. She reports  "previous drug use. Drug: Marijuana.     Past Family Hx:  Natasha Pino family history includes Cancer in her maternal grandmother and mother; Diabetes in her maternal grandmother; GI Disease in her sister; Heart Attack in her father; Hyperlipidemia in her father; Seizures in her sister.     Problem list, medications, and allergies reviewed by myself today in Epic.     Objective:     Blood Pressure 120/82   Pulse 76   Temperature 36.8 °C (98.2 °F)   Respiration 12   Height 1.626 m (5' 4\")   Weight 85.3 kg (188 lb)   Last Menstrual Period 02/19/2020 (Exact Date)   Oxygen Saturation 95%   Body Mass Index 32.27 kg/m²     Physical Exam  Vitals signs reviewed.   Constitutional:       General: She is not in acute distress.     Appearance: She is well-developed. She is not ill-appearing or toxic-appearing.      Interventions: She is not intubated.  HENT:      Head: Normocephalic and atraumatic.      Right Ear: Hearing, tympanic membrane, ear canal and external ear normal.      Left Ear: Hearing, tympanic membrane, ear canal and external ear normal.      Nose: Nose normal.      Mouth/Throat:      Pharynx: Uvula midline.   Eyes:      General: Lids are normal.      Conjunctiva/sclera: Conjunctivae normal.   Neck:      Musculoskeletal: Full passive range of motion without pain, normal range of motion and neck supple.   Cardiovascular:      Rate and Rhythm: Regular rhythm.      Heart sounds: Normal heart sounds, S1 normal and S2 normal. No murmur. No friction rub. No gallop.    Pulmonary:      Effort: Pulmonary effort is normal. No tachypnea, bradypnea, accessory muscle usage or respiratory distress. She is not intubated.      Breath sounds: Normal breath sounds. No decreased breath sounds, wheezing, rhonchi or rales.   Chest:      Chest wall: No tenderness.   Musculoskeletal: Normal range of motion.   Skin:     General: Skin is warm and dry.   Neurological:      Mental Status: She is alert and oriented to " person, place, and time.   Psychiatric:         Speech: Speech normal.         Behavior: Behavior normal.         Thought Content: Thought content normal.         Judgment: Judgment normal.         Assessment/Plan:     Medical Decision Making/Comments   Pt is a 30 yr old female who presents for evaluation of possible Covid-19 infection.  Pt states possible exposure. Complains of fever, mylagia, headache.  Vitals and exam unremarkable.       Diagnosis and associated orders     1. Exposure to COVID-19 virus  COVID/SARS COV-2 PCR    POCT Influenza A/B   - isolate for 24-72 hrs while pcr test is pending  - treat symptoms with OTC medications             Differential diagnosis, natural history, supportive care, and indications for immediate follow-up discussed.    Advised the patient to follow-up with the primary care physician for recheck, reevaluation, and consideration of further management.    Please note that this dictation was created using voice recognition software. I have made a reasonable attempt to correct obvious errors, but I expect that there are errors of grammar and possibly content that I did not discover before finalizing the note.

## 2020-11-19 DIAGNOSIS — Z20.822 EXPOSURE TO COVID-19 VIRUS: ICD-10-CM

## 2020-11-19 LAB — COVID ORDER STATUS COVID19: NORMAL

## 2020-11-20 LAB
SARS-COV-2 RNA RESP QL NAA+PROBE: NOTDETECTED
SPECIMEN SOURCE: NORMAL

## 2020-12-17 ENCOUNTER — HOSPITAL ENCOUNTER (OUTPATIENT)
Dept: LAB | Facility: MEDICAL CENTER | Age: 30
End: 2020-12-17
Attending: OBSTETRICS & GYNECOLOGY
Payer: COMMERCIAL

## 2020-12-17 PROCEDURE — 87591 N.GONORRHOEAE DNA AMP PROB: CPT

## 2020-12-17 PROCEDURE — 87491 CHLMYD TRACH DNA AMP PROBE: CPT

## 2020-12-17 PROCEDURE — 88175 CYTOPATH C/V AUTO FLUID REDO: CPT

## 2020-12-18 LAB
C TRACH DNA GENITAL QL NAA+PROBE: NEGATIVE
CYTOLOGY REG CYTOL: NORMAL
N GONORRHOEA DNA GENITAL QL NAA+PROBE: NEGATIVE
SPECIMEN SOURCE: NORMAL

## 2020-12-22 ENCOUNTER — HOSPITAL ENCOUNTER (OUTPATIENT)
Dept: LAB | Facility: MEDICAL CENTER | Age: 30
End: 2020-12-22
Attending: OBSTETRICS & GYNECOLOGY
Payer: COMMERCIAL

## 2020-12-22 LAB
ABO GROUP BLD: NORMAL
BASOPHILS # BLD AUTO: 0.4 % (ref 0–1.8)
BASOPHILS # BLD: 0.03 K/UL (ref 0–0.12)
BLD GP AB SCN SERPL QL: NORMAL
EOSINOPHIL # BLD AUTO: 0.04 K/UL (ref 0–0.51)
EOSINOPHIL NFR BLD: 0.5 % (ref 0–6.9)
ERYTHROCYTE [DISTWIDTH] IN BLOOD BY AUTOMATED COUNT: 39.7 FL (ref 35.9–50)
HBV SURFACE AG SER QL: NORMAL
HCT VFR BLD AUTO: 44.2 % (ref 37–47)
HCV AB SER QL: NORMAL
HGB BLD-MCNC: 15.1 G/DL (ref 12–16)
HIV 1+2 AB+HIV1 P24 AG SERPL QL IA: NORMAL
IMM GRANULOCYTES # BLD AUTO: 0.03 K/UL (ref 0–0.11)
IMM GRANULOCYTES NFR BLD AUTO: 0.4 % (ref 0–0.9)
LYMPHOCYTES # BLD AUTO: 1.33 K/UL (ref 1–4.8)
LYMPHOCYTES NFR BLD: 16.5 % (ref 22–41)
MCH RBC QN AUTO: 31.1 PG (ref 27–33)
MCHC RBC AUTO-ENTMCNC: 34.2 G/DL (ref 33.6–35)
MCV RBC AUTO: 91.1 FL (ref 81.4–97.8)
MONOCYTES # BLD AUTO: 0.35 K/UL (ref 0–0.85)
MONOCYTES NFR BLD AUTO: 4.3 % (ref 0–13.4)
NEUTROPHILS # BLD AUTO: 6.27 K/UL (ref 2–7.15)
NEUTROPHILS NFR BLD: 77.9 % (ref 44–72)
NRBC # BLD AUTO: 0 K/UL
NRBC BLD-RTO: 0 /100 WBC
PLATELET # BLD AUTO: 303 K/UL (ref 164–446)
PMV BLD AUTO: 9.7 FL (ref 9–12.9)
RBC # BLD AUTO: 4.85 M/UL (ref 4.2–5.4)
RH BLD: NORMAL
RUBV AB SER QL: 45.5 IU/ML
TREPONEMA PALLIDUM IGG+IGM AB [PRESENCE] IN SERUM OR PLASMA BY IMMUNOASSAY: NORMAL
WBC # BLD AUTO: 8.1 K/UL (ref 4.8–10.8)

## 2020-12-22 PROCEDURE — 86780 TREPONEMA PALLIDUM: CPT

## 2020-12-22 PROCEDURE — 87340 HEPATITIS B SURFACE AG IA: CPT

## 2020-12-22 PROCEDURE — 36415 COLL VENOUS BLD VENIPUNCTURE: CPT

## 2020-12-22 PROCEDURE — 87086 URINE CULTURE/COLONY COUNT: CPT

## 2020-12-22 PROCEDURE — 86803 HEPATITIS C AB TEST: CPT

## 2020-12-22 PROCEDURE — 87389 HIV-1 AG W/HIV-1&-2 AB AG IA: CPT

## 2020-12-22 PROCEDURE — 86850 RBC ANTIBODY SCREEN: CPT

## 2020-12-22 PROCEDURE — 86900 BLOOD TYPING SEROLOGIC ABO: CPT

## 2020-12-22 PROCEDURE — 86901 BLOOD TYPING SEROLOGIC RH(D): CPT

## 2020-12-22 PROCEDURE — 86762 RUBELLA ANTIBODY: CPT

## 2020-12-22 PROCEDURE — 87077 CULTURE AEROBIC IDENTIFY: CPT

## 2020-12-22 PROCEDURE — 85025 COMPLETE CBC W/AUTO DIFF WBC: CPT

## 2020-12-24 LAB
BACTERIA UR CULT: ABNORMAL
BACTERIA UR CULT: ABNORMAL
SIGNIFICANT IND 70042: ABNORMAL
SITE SITE: ABNORMAL
SOURCE SOURCE: ABNORMAL

## 2021-01-12 ENCOUNTER — HOSPITAL ENCOUNTER (EMERGENCY)
Facility: MEDICAL CENTER | Age: 31
End: 2021-01-12
Attending: EMERGENCY MEDICINE
Payer: COMMERCIAL

## 2021-01-12 ENCOUNTER — APPOINTMENT (OUTPATIENT)
Dept: RADIOLOGY | Facility: MEDICAL CENTER | Age: 31
End: 2021-01-12
Attending: EMERGENCY MEDICINE
Payer: COMMERCIAL

## 2021-01-12 VITALS
HEIGHT: 64 IN | WEIGHT: 186.29 LBS | BODY MASS INDEX: 31.8 KG/M2 | SYSTOLIC BLOOD PRESSURE: 118 MMHG | RESPIRATION RATE: 16 BRPM | OXYGEN SATURATION: 100 % | HEART RATE: 86 BPM | TEMPERATURE: 98.2 F | DIASTOLIC BLOOD PRESSURE: 66 MMHG

## 2021-01-12 DIAGNOSIS — R10.11 RIGHT UPPER QUADRANT ABDOMINAL PAIN: ICD-10-CM

## 2021-01-12 DIAGNOSIS — K59.00 CONSTIPATION, UNSPECIFIED CONSTIPATION TYPE: ICD-10-CM

## 2021-01-12 LAB
ALBUMIN SERPL BCP-MCNC: 3.9 G/DL (ref 3.2–4.9)
ALBUMIN/GLOB SERPL: 1.4 G/DL
ALP SERPL-CCNC: 45 U/L (ref 30–99)
ALT SERPL-CCNC: 13 U/L (ref 2–50)
ANION GAP SERPL CALC-SCNC: 11 MMOL/L (ref 7–16)
APPEARANCE UR: CLEAR
AST SERPL-CCNC: 14 U/L (ref 12–45)
BASOPHILS # BLD AUTO: 0.2 % (ref 0–1.8)
BASOPHILS # BLD: 0.02 K/UL (ref 0–0.12)
BILIRUB SERPL-MCNC: 0.4 MG/DL (ref 0.1–1.5)
BILIRUB UR QL STRIP.AUTO: NEGATIVE
BUN SERPL-MCNC: 4 MG/DL (ref 8–22)
CALCIUM SERPL-MCNC: 9.3 MG/DL (ref 8.5–10.5)
CHLORIDE SERPL-SCNC: 102 MMOL/L (ref 96–112)
CO2 SERPL-SCNC: 21 MMOL/L (ref 20–33)
COLOR UR: YELLOW
CREAT SERPL-MCNC: 0.48 MG/DL (ref 0.5–1.4)
EOSINOPHIL # BLD AUTO: 0.04 K/UL (ref 0–0.51)
EOSINOPHIL NFR BLD: 0.5 % (ref 0–6.9)
ERYTHROCYTE [DISTWIDTH] IN BLOOD BY AUTOMATED COUNT: 41 FL (ref 35.9–50)
GLOBULIN SER CALC-MCNC: 2.7 G/DL (ref 1.9–3.5)
GLUCOSE SERPL-MCNC: 82 MG/DL (ref 65–99)
GLUCOSE UR STRIP.AUTO-MCNC: NEGATIVE MG/DL
HCT VFR BLD AUTO: 40.1 % (ref 37–47)
HGB BLD-MCNC: 13.6 G/DL (ref 12–16)
IMM GRANULOCYTES # BLD AUTO: 0.04 K/UL (ref 0–0.11)
IMM GRANULOCYTES NFR BLD AUTO: 0.5 % (ref 0–0.9)
KETONES UR STRIP.AUTO-MCNC: NEGATIVE MG/DL
LEUKOCYTE ESTERASE UR QL STRIP.AUTO: NEGATIVE
LIPASE SERPL-CCNC: 27 U/L (ref 11–82)
LYMPHOCYTES # BLD AUTO: 1.25 K/UL (ref 1–4.8)
LYMPHOCYTES NFR BLD: 15 % (ref 22–41)
MCH RBC QN AUTO: 30.8 PG (ref 27–33)
MCHC RBC AUTO-ENTMCNC: 33.9 G/DL (ref 33.6–35)
MCV RBC AUTO: 90.9 FL (ref 81.4–97.8)
MICRO URNS: NORMAL
MONOCYTES # BLD AUTO: 0.46 K/UL (ref 0–0.85)
MONOCYTES NFR BLD AUTO: 5.5 % (ref 0–13.4)
NEUTROPHILS # BLD AUTO: 6.55 K/UL (ref 2–7.15)
NEUTROPHILS NFR BLD: 78.3 % (ref 44–72)
NITRITE UR QL STRIP.AUTO: NEGATIVE
NRBC # BLD AUTO: 0 K/UL
NRBC BLD-RTO: 0 /100 WBC
PH UR STRIP.AUTO: 7.5 [PH] (ref 5–8)
PLATELET # BLD AUTO: 273 K/UL (ref 164–446)
PMV BLD AUTO: 9.6 FL (ref 9–12.9)
POTASSIUM SERPL-SCNC: 3.7 MMOL/L (ref 3.6–5.5)
PROT SERPL-MCNC: 6.6 G/DL (ref 6–8.2)
PROT UR QL STRIP: NEGATIVE MG/DL
RBC # BLD AUTO: 4.41 M/UL (ref 4.2–5.4)
RBC UR QL AUTO: NEGATIVE
SODIUM SERPL-SCNC: 134 MMOL/L (ref 135–145)
SP GR UR STRIP.AUTO: 1
UROBILINOGEN UR STRIP.AUTO-MCNC: 0.2 MG/DL
WBC # BLD AUTO: 8.4 K/UL (ref 4.8–10.8)

## 2021-01-12 PROCEDURE — A9270 NON-COVERED ITEM OR SERVICE: HCPCS | Mod: EDC | Performed by: EMERGENCY MEDICINE

## 2021-01-12 PROCEDURE — 76705 ECHO EXAM OF ABDOMEN: CPT

## 2021-01-12 PROCEDURE — 81003 URINALYSIS AUTO W/O SCOPE: CPT | Mod: EDC

## 2021-01-12 PROCEDURE — 700102 HCHG RX REV CODE 250 W/ 637 OVERRIDE(OP): Mod: EDC | Performed by: EMERGENCY MEDICINE

## 2021-01-12 PROCEDURE — 85025 COMPLETE CBC W/AUTO DIFF WBC: CPT | Mod: EDC

## 2021-01-12 PROCEDURE — 700111 HCHG RX REV CODE 636 W/ 250 OVERRIDE (IP): Mod: EDC | Performed by: EMERGENCY MEDICINE

## 2021-01-12 PROCEDURE — 99284 EMERGENCY DEPT VISIT MOD MDM: CPT | Mod: EDC

## 2021-01-12 PROCEDURE — 80053 COMPREHEN METABOLIC PANEL: CPT | Mod: EDC

## 2021-01-12 PROCEDURE — 83690 ASSAY OF LIPASE: CPT | Mod: EDC

## 2021-01-12 RX ORDER — DOCUSATE SODIUM 100 MG/1
100 CAPSULE, LIQUID FILLED ORAL 2 TIMES DAILY
COMMUNITY
End: 2021-03-04

## 2021-01-12 RX ORDER — OXYCODONE HYDROCHLORIDE AND ACETAMINOPHEN 5; 325 MG/1; MG/1
1 TABLET ORAL EVERY 4 HOURS PRN
Qty: 20 TAB | Refills: 0 | Status: SHIPPED | OUTPATIENT
Start: 2021-01-12 | End: 2021-01-17

## 2021-01-12 RX ORDER — ONDANSETRON 4 MG/1
4 TABLET, ORALLY DISINTEGRATING ORAL ONCE
Status: COMPLETED | OUTPATIENT
Start: 2021-01-12 | End: 2021-01-12

## 2021-01-12 RX ORDER — ACETAMINOPHEN 325 MG/1
975 TABLET ORAL ONCE
Status: COMPLETED | OUTPATIENT
Start: 2021-01-12 | End: 2021-01-12

## 2021-01-12 RX ADMIN — ACETAMINOPHEN 975 MG: 325 TABLET, FILM COATED ORAL at 10:02

## 2021-01-12 RX ADMIN — ONDANSETRON 4 MG: 4 TABLET, ORALLY DISINTEGRATING ORAL at 09:56

## 2021-01-12 ASSESSMENT — LIFESTYLE VARIABLES
DOES PATIENT WANT TO STOP DRINKING: NO
DO YOU DRINK ALCOHOL: NO

## 2021-01-12 ASSESSMENT — FIBROSIS 4 INDEX: FIB4 SCORE: 0.33

## 2021-01-12 NOTE — ED TRIAGE NOTES
"Chief Complaint   Patient presents with   • Abdominal Pain     Right sided abdominal pain in the RUQ starting this morning with last normal bowel movement was \"weeks ago\". No painful urination, no vaginal bleeding. Currently 14 weeks pregnant last US 12/17/20 with no complications.   • Constipation     Doculax being used but still having difficulty having normal bowel movement     Pt ambulatory to triage for above complaint. Currently 14 weeks pregnant with prenatal care that currently has no expected complications.  Pt is AO x 4, follows commands, and responds appropriately to questions. Patient's breathing is mildly labored and pain is currently 10/10 on the 0-10 pain scale.  Pt placed in lobby. Patient educated on triage process and encouraged to alert staff for any changes.    "

## 2021-01-12 NOTE — DISCHARGE INSTRUCTIONS
Buy Miralax at the store and take for constipation    Constipation, Adult  Constipation is when a person:  · Poops (has a bowel movement) fewer times in a week than normal.  · Has a hard time pooping.  · Has poop that is dry, hard, or bigger than normal.  Follow these instructions at home:  Eating and drinking    · Eat foods that have a lot of fiber, such as:  ? Fresh fruits and vegetables.  ? Whole grains.  ? Beans.  · Eat less of foods that are high in fat, low in fiber, or overly processed, such as:  ? French fries.  ? Hamburgers.  ? Cookies.  ? Candy.  ? Soda.  · Drink enough fluid to keep your pee (urine) clear or pale yellow.  General instructions  · Exercise regularly or as told by your doctor.  · Go to the restroom when you feel like you need to poop. Do not hold it in.  · Take over-the-counter and prescription medicines only as told by your doctor. These include any fiber supplements.  · Do pelvic floor retraining exercises, such as:  ? Doing deep breathing while relaxing your lower belly (abdomen).  ? Relaxing your pelvic floor while pooping.  · Watch your condition for any changes.  · Keep all follow-up visits as told by your doctor. This is important.  Contact a doctor if:  · You have pain that gets worse.  · You have a fever.  · You have not pooped for 4 days.  · You throw up (vomit).  · You are not hungry.  · You lose weight.  · You are bleeding from the anus.  · You have thin, pencil-like poop (stool).  Get help right away if:  · You have a fever, and your symptoms suddenly get worse.  · You leak poop or have blood in your poop.  · Your belly feels hard or bigger than normal (is bloated).  · You have very bad belly pain.  · You feel dizzy or you faint.  This information is not intended to replace advice given to you by your health care provider. Make sure you discuss any questions you have with your health care provider.  Document Released: 06/05/2009 Document Revised: 11/30/2018 Document Reviewed:  06/07/2017  Elsevier Patient Education © 2020 [employer]  Abdominal Pain, Adult  Abdominal pain can be caused by many things. Often, abdominal pain is not serious and it gets better with no treatment or by being treated at home. However, sometimes abdominal pain is serious. Your health care provider will do a medical history and a physical exam to try to determine the cause of your abdominal pain.  Follow these instructions at home:  · Take over-the-counter and prescription medicines only as told by your health care provider. Do not take a laxative unless told by your health care provider.  · Drink enough fluid to keep your urine clear or pale yellow.  · Watch your condition for any changes.  · Keep all follow-up visits as told by your health care provider. This is important.  Contact a health care provider if:  · Your abdominal pain changes or gets worse.  · You are not hungry or you lose weight without trying.  · You are constipated or have diarrhea for more than 2-3 days.  · You have pain when you urinate or have a bowel movement.  · Your abdominal pain wakes you up at night.  · Your pain gets worse with meals, after eating, or with certain foods.  · You are throwing up and cannot keep anything down.  · You have a fever.  Get help right away if:  · Your pain does not go away as soon as your health care provider told you to expect.  · You cannot stop throwing up.  · Your pain is only in areas of the abdomen, such as the right side or the left lower portion of the abdomen.  · You have bloody or black stools, or stools that look like tar.  · You have severe pain, cramping, or bloating in your abdomen.  · You have signs of dehydration, such as:  ? Dark urine, very little urine, or no urine.  ? Cracked lips.  ? Dry mouth.  ? Sunken eyes.  ? Sleepiness.  ? Weakness.  This information is not intended to replace advice given to you by your health care provider. Make sure you discuss any questions you have with your  health care provider.  Document Released: 09/27/2006 Document Revised: 07/07/2017 Document Reviewed: 05/31/2017  Elsevier Interactive Patient Education © 2020 [employer]

## 2021-01-12 NOTE — ED PROVIDER NOTES
"ED Provider Note    CHIEF COMPLAINT  Chief Complaint   Patient presents with   • Abdominal Pain     Right sided abdominal pain in the RUQ starting this morning with last normal bowel movement was \"weeks ago\". No painful urination, no vaginal bleeding. Currently 14 weeks pregnant last US 12/17/20 with no complications.   • Constipation     Doculax being used but still having difficulty having normal bowel movement       HPI  Natasha Pino is a 30 y.o. female who presents with a history of previous appendectomy, stating that she is 14 weeks pregnant, she has had a previous ultrasound that showed an IUP, the patient reports severe right upper quadrant pain that radiates to her back that gradually came on over the last 24 hours.  The patient has eaten less so it is unclear if it is related to food.  She has had nausea associated with this.  She denies any urinary symptoms.  She also describes constipation.  She has been having daily small bowel movements that are very hard.  She has had no vaginal bleeding.  She reports that her father had his gallbladder removed.    REVIEW OF SYSTEMS  See HPI for further details. All other systems are negative.     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    SOCIAL HISTORY  Social History     Tobacco Use   • Smoking status: Never Smoker   • Smokeless tobacco: Never Used   Substance and Sexual Activity   • Alcohol use: Not Currently     Alcohol/week: 0.0 oz     Comment: Socially   • Drug use: Not Currently     Types: Marijuana   • Sexual activity: Yes     Partners: Male     Birth control/protection: Pill     Comment:        SURGICAL HISTORY   has a past surgical history that includes leep (2011) and appendectomy.    CURRENT MEDICATIONS  Home Medications     Reviewed by " "Bill Rivera R.N. (Registered Nurse) on 01/12/21 at 0902  Med List Status: Partial   Medication Last Dose Status   albuterol 108 (90 Base) MCG/ACT Aero Soln inhalation aerosol  Active   docusate sodium (COLACE) 100 MG Cap  Active   ibuprofen (MOTRIN) 200 MG Tab  Active                ALLERGIES  Allergies   Allergen Reactions   • Keppra [Levetiracetam] Unspecified     Severe Depression    • Mushroom Extract Complex Anaphylaxis     Pt reports her throat swells       PHYSICAL EXAM  VITAL SIGNS: /68   Pulse 69   Temp 36.1 °C (96.9 °F) (Temporal)   Resp 14   Ht 1.626 m (5' 4\")   Wt 84.5 kg (186 lb 4.6 oz)   LMP 02/19/2020 (Exact Date)   SpO2 98%   BMI 31.98 kg/m²  @ORTIZ[[id number]::@   Pulse ox interpretation: I interpret this pulse ox as normal.  Constitutional: Alert, appears uncomfortable, moving in the gurney to find a comfortable position.  HENT: No signs of trauma, Bilateral external ears normal, Nose normal.   Eyes: Pupils are equal and reactive, Conjunctiva normal, Non-icteric.   Neck: Normal range of motion, No tenderness, Supple, No stridor.   Lymphatic: No lymphadenopathy noted.   Cardiovascular: Regular rate and rhythm, no murmurs.   Thorax & Lungs: Normal breath sounds, No respiratory distress, No wheezing, No chest tenderness.   Abdomen: Bowel sounds normal, Soft, right upper quadrant tenderness.  Skin: Warm, Dry, No erythema, No rash.   Back: No bony tenderness, No CVA tenderness.   Extremities: Intact distal pulses, No edema, No tenderness, No cyanosis.  Musculoskeletal: Good range of motion in all major joints. No tenderness to palpation or major deformities noted.   Neurologic: Alert , Normal motor function, Normal sensory function, No focal deficits noted.   Psychiatric: Affect normal, Judgment normal, Mood normal.       DIAGNOSTIC STUDIES / PROCEDURES        LABS  Labs Reviewed   CBC WITH DIFFERENTIAL - Abnormal; Notable for the following components:       Result Value    " Neutrophils-Polys 78.30 (*)     Lymphocytes 15.00 (*)     All other components within normal limits   COMP METABOLIC PANEL - Abnormal; Notable for the following components:    Sodium 134 (*)     Bun 4 (*)     Creatinine 0.48 (*)     All other components within normal limits   URINALYSIS,CULTURE IF INDICATED    Narrative:     Indication for culture:->Unexplained new onset of Flank pain  and/or Costovertebral angle tenderness   LIPASE   ESTIMATED GFR         RADIOLOGY  US-RUQ   Final Result      No evidence of gallstones or biliary ductal dilatation.              COURSE & MEDICAL DECISION MAKING  Pertinent Labs & Imaging studies reviewed. (See chart for details)    Differential diagnosis: Gallbladder disease, pancreatitis, UTI    The patient is given Zofran 4 mg p.o. and Tylenol 975 mg p.o.  Labs were sent, ultrasound was ordered.    The patient's labs and US are unremarkable.  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  I have given her the phone number of the general surgeon on-call, I prescribed her pain medication.  The patient has an appoint with her OB tomorrow.    I reviewed prescription monitoring program for patient's narcotic use before prescribing a scheduled drug.The patient will not drink alcohol nor drive with prescribed medications. The patient will return for new or worsening symptoms and is stable at the time of discharge.    The patient is referred to a primary physician for blood pressure management, diabetic screening, and for all other preventative health concerns.    In prescribing controlled substances to this patient, I certify that I have obtained and reviewed the medical history of Natasha Irish Pino. I have also made a good lis effort to obtain applicable records from other providers who have treated the patient and records did not demonstrate any increased risk of substance abuse that would prevent me from prescribing controlled substances.     I have conducted a  physical exam and documented it. I have reviewed Ms. Pino’s prescription history as maintained by the [place] Prescription Monitoring Program.     I have assessed the patient’s risk for abuse, dependency, and addiction using the validated Opioid Risk Tool available at https://www.mdcalc.com/ktjlgr-pwyc-nrix-ort-narcotic-abuse.     Given the above, I believe the benefits of controlled substance therapy outweigh the risks. The reasons for prescribing controlled substances include non-narcotic, oral analgesic alternatives have been inadequate for pain control. Accordingly, I have discussed the risk and benefits, treatment plan, and alternative therapies with the patient.         DISPOSITION:  Patient will be discharged home in stable condition.    FOLLOW UP:  [facility] [place], Emergency Dept  [address] [id number] [phone]    If symptoms worsen    Ata Sky M.D.  [address]  [address]  [place] [facility] [address]  [phone]      As needed          See your obstetrician tomorrow as scheduled    Jj Ortez D.O.  [address] [facility] [address]  [address]  [place] [address]  [phone]      If symptoms continue, follow-up with general surgeon for outpatient HIDA scan      OUTPATIENT MEDICATIONS:  New Prescriptions    OXYCODONE-ACETAMINOPHEN (PERCOCET) 5-325 MG TAB    Take 1 Tab by mouth every four hours as needed (pain) for up to 5 days. No driving, no drinking alcohol         The patient will not drink alcohol nor drive with prescribed medications. The patient will return for worsening symptoms and is stable at the time of discharge. The patient verbalizes understanding and will comply.    FINAL IMPRESSION  1. Right upper quadrant abdominal pain  oxyCODONE-acetaminophen (PERCOCET) 5-325 MG Tab   2. Constipation, unspecified constipation type                Electronically signed by: Karri Silveira M.D., 1/12/2021 9:49 AM

## 2021-01-12 NOTE — ED NOTES
Urine collected and sent to lab.  PIV established and blood sent.  Pt medicated per ERP order.  Pt attached to monitor, call light within reach and no needs at this time.

## 2021-01-12 NOTE — ED NOTES
Agree with triage note. Pt reports onset of dull RUQ pain that started last night.  Sharp pain that started at 0630.  Pt states she felt that it started out as indigestion, stretching is worse and sitting improves.  + nausea and constipation.  Last BM was this AM, firm

## 2021-01-12 NOTE — ED NOTES
"PIV removed, tip intact.  Discharge instructions reviewed with pt regarding constipation, RX x 1 sent to preferred pharmacy.  Pt instructed on signs and symptoms to return to ED, instructed on importance of oral hydration, no questions regarding this.   Instructed to follow-up with   [facility], Emergency Dept  [address]  [place] [address] [phone]    If symptoms worsen    Ata Sky M.D.  [address]  [address]  [place] [address]  [phone]      As needed          See your obstetrician tomorrow as scheduled    Jj Otrez D.O.  [address] [facility] [address]  [address]  [place] [id number] [phone]      If symptoms continue, follow-up with general surgeon for outpatient HIDA scan    Pt has no questions at this time, /66   Pulse 86   Temp 36.8 °C (98.2 °F) (Temporal)   Resp 16   Ht 1.626 m (5' 4\")   Wt 84.5 kg (186 lb 4.6 oz)   LMP 02/19/2020 (Exact Date)   SpO2 100%   BMI 31.98 kg/m²   Pt leaves alert, age appropriate and in NAD.          "

## 2021-01-26 ENCOUNTER — HOSPITAL ENCOUNTER (OUTPATIENT)
Facility: MEDICAL CENTER | Age: 31
End: 2021-01-26
Attending: OBSTETRICS & GYNECOLOGY | Admitting: OBSTETRICS & GYNECOLOGY
Payer: COMMERCIAL

## 2021-02-04 ENCOUNTER — HOSPITAL ENCOUNTER (OUTPATIENT)
Dept: LAB | Facility: MEDICAL CENTER | Age: 31
End: 2021-02-04
Attending: OBSTETRICS & GYNECOLOGY
Payer: COMMERCIAL

## 2021-02-04 LAB
COVID ORDER STATUS COVID19: NORMAL
SARS-COV-2 RNA RESP QL NAA+PROBE: NOTDETECTED
SPECIMEN SOURCE: NORMAL

## 2021-02-04 PROCEDURE — U0005 INFEC AGEN DETEC AMPLI PROBE: HCPCS

## 2021-02-04 PROCEDURE — U0003 INFECTIOUS AGENT DETECTION BY NUCLEIC ACID (DNA OR RNA); SEVERE ACUTE RESPIRATORY SYNDROME CORONAVIRUS 2 (SARS-COV-2) (CORONAVIRUS DISEASE [COVID-19]), AMPLIFIED PROBE TECHNIQUE, MAKING USE OF HIGH THROUGHPUT TECHNOLOGIES AS DESCRIBED BY CMS-2020-01-R: HCPCS

## 2021-02-04 PROCEDURE — C9803 HOPD COVID-19 SPEC COLLECT: HCPCS

## 2021-03-04 ENCOUNTER — OFFICE VISIT (OUTPATIENT)
Dept: URGENT CARE | Facility: PHYSICIAN GROUP | Age: 31
End: 2021-03-04
Payer: COMMERCIAL

## 2021-03-04 VITALS
SYSTOLIC BLOOD PRESSURE: 126 MMHG | OXYGEN SATURATION: 98 % | DIASTOLIC BLOOD PRESSURE: 68 MMHG | RESPIRATION RATE: 16 BRPM | HEART RATE: 97 BPM | WEIGHT: 194.6 LBS | BODY MASS INDEX: 33.22 KG/M2 | TEMPERATURE: 99.1 F | HEIGHT: 64 IN

## 2021-03-04 DIAGNOSIS — H66.002 NON-RECURRENT ACUTE SUPPURATIVE OTITIS MEDIA OF LEFT EAR WITHOUT SPONTANEOUS RUPTURE OF TYMPANIC MEMBRANE: ICD-10-CM

## 2021-03-04 DIAGNOSIS — Z3A.22 22 WEEKS GESTATION OF PREGNANCY: ICD-10-CM

## 2021-03-04 DIAGNOSIS — B00.1 COLD SORE: ICD-10-CM

## 2021-03-04 DIAGNOSIS — J02.9 SORE THROAT: ICD-10-CM

## 2021-03-04 LAB
INT CON NEG: NEGATIVE
INT CON POS: POSITIVE
S PYO AG THROAT QL: NEGATIVE

## 2021-03-04 PROCEDURE — 99214 OFFICE O/P EST MOD 30 MIN: CPT | Performed by: PHYSICIAN ASSISTANT

## 2021-03-04 PROCEDURE — 87880 STREP A ASSAY W/OPTIC: CPT | Performed by: PHYSICIAN ASSISTANT

## 2021-03-04 RX ORDER — VALACYCLOVIR HYDROCHLORIDE 1 G/1
2000 TABLET, FILM COATED ORAL 2 TIMES DAILY
Qty: 4 TABLET | Refills: 0 | Status: SHIPPED | OUTPATIENT
Start: 2021-03-04 | End: 2021-03-05

## 2021-03-04 RX ORDER — CEFDINIR 300 MG/1
300 CAPSULE ORAL 2 TIMES DAILY
Qty: 10 CAPSULE | Refills: 0 | Status: SHIPPED | OUTPATIENT
Start: 2021-03-04 | End: 2021-03-09

## 2021-03-04 RX ORDER — CETIRIZINE HYDROCHLORIDE 10 MG/1
10 TABLET ORAL DAILY
Qty: 30 TABLET | Refills: 0 | Status: SHIPPED | OUTPATIENT
Start: 2021-03-04 | End: 2021-04-27

## 2021-03-04 ASSESSMENT — PAIN SCALES - GENERAL: PAINLEVEL: 2=MINIMAL-SLIGHT

## 2021-03-04 ASSESSMENT — FIBROSIS 4 INDEX: FIB4 SCORE: 0.44

## 2021-03-04 ASSESSMENT — ENCOUNTER SYMPTOMS
VOMITING: 0
COUGH: 1
SORE THROAT: 1
DIARRHEA: 0
NECK PAIN: 0
RHINORRHEA: 1
HEADACHES: 1

## 2021-03-04 NOTE — PROGRESS NOTES
Subjective:   Natasha Pino is a 30 y.o. female who presents for Ear Pain ( Pt states she has been having an left earache for 2 days ago, headache, wet cough, low grade temp and runny nose.  )        1.  Patient has had a cold sore for numerous days.  States that it is worse than her typical course with sores and not improving.  She typically takes lysine for these, but she is unsure if this is safe to take during pregnancy.  She is 22 weeks pregnant.      2.       Otalgia   There is pain in the left ear. This is a new problem. The current episode started in the past 7 days. The problem occurs constantly. The problem has been gradually worsening. Maximum temperature: subjective, low grade, intermittent <100F. The fever has been present for 1 to 2 days. The pain is moderate. Associated symptoms include coughing (wet), headaches, rhinorrhea and a sore throat. Pertinent negatives include no diarrhea, ear discharge, hearing loss, neck pain, rash or vomiting. She has tried acetaminophen for the symptoms. The treatment provided mild relief. There is no history of a chronic ear infection or hearing loss.     Review of Systems   HENT: Positive for ear pain, rhinorrhea and sore throat. Negative for ear discharge and hearing loss.    Respiratory: Positive for cough (wet).    Gastrointestinal: Negative for diarrhea and vomiting.   Musculoskeletal: Negative for neck pain.   Skin: Negative for rash.   Neurological: Positive for headaches.       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 She also has no past medical history of Anxiety, Blood transfusion without reported diagnosis, Clotting disorder (HCC), GERD (gastroesophageal reflux disease), Hyperlipidemia, Hypertension, IBD (inflammatory bowel disease), Muscle disorder, Osteoporosis, or  "Substance abuse (HCC).  MEDS:   Current Outpatient Medications:   •  valacyclovir (VALTREX) 1 GM Tab, Take 2 Tablets by mouth 2 times a day for 1 day., Disp: 4 tablet, Rfl: 0  •  cefdinir (OMNICEF) 300 MG Cap, Take 1 capsule by mouth 2 times a day for 5 days., Disp: 10 capsule, Rfl: 0  •  cetirizine (ZYRTEC ALLERGY) 10 MG Tab, Take 1 tablet by mouth every day., Disp: 30 tablet, Rfl: 0  •  budesonide (RINOCORT AQUA) 32 MCG/ACT nasal spray, Administer 2 Sprays into affected nostril(S) every day., Disp: 5 mL, Rfl: 0  •  docusate sodium (COLACE) 100 MG Cap, Take 100 mg by mouth 2 times a day. Indications: Constipation, Disp: , Rfl:   •  albuterol 108 (90 Base) MCG/ACT Aero Soln inhalation aerosol, Inhale 2 Puffs by mouth every 6 hours as needed for Shortness of Breath. (Patient not taking: Reported on 11/18/2020), Disp: 8.5 g, Rfl: 0  •  ibuprofen (MOTRIN) 200 MG Tab, Take 400 mg by mouth every 6 hours as needed for Mild Pain., Disp: , Rfl:   ALLERGIES:   Allergies   Allergen Reactions   • Keppra [Levetiracetam] Unspecified     Severe Depression    • Mushroom Extract Complex Anaphylaxis     Pt reports her throat swells     SURGHX:   Past Surgical History:   Procedure Laterality Date   • LEEP  2011   • APPENDECTOMY       SOCHX:  reports that she has never smoked. She has never used smokeless tobacco. She reports previous alcohol use. She reports previous drug use. Drug: Marijuana.  FH: Family history was reviewed, no pertinent findings to report   Objective:   /68 (BP Location: Right arm, Patient Position: Sitting, BP Cuff Size: Adult)   Pulse 97   Temp 37.3 °C (99.1 °F) (Temporal)   Resp 16   Ht 1.626 m (5' 4\")   Wt 88.3 kg (194 lb 9.6 oz)   LMP 02/19/2020 (Exact Date)   SpO2 98%   BMI 33.40 kg/m²   Physical Exam  Vitals reviewed.   Constitutional:       General: She is not in acute distress.     Appearance: Normal appearance. She is well-developed. She is not toxic-appearing.   HENT:      Head: " Normocephalic and atraumatic.      Right Ear: Tympanic membrane, ear canal and external ear normal.      Left Ear: Ear canal and external ear normal. Tympanic membrane is injected. Tympanic membrane is not perforated. Tympanic membrane has decreased mobility.      Ears:      Comments: Left TM is dull and nonmobile.  There is visible white fluid behind TM.     Nose: Mucosal edema present. No congestion or rhinorrhea.      Mouth/Throat:      Lips: Pink.      Mouth: Mucous membranes are moist.      Pharynx: Oropharynx is clear. Uvula midline. Posterior oropharyngeal erythema present.        Comments: Cold sore in marked area.  Patient has some dry skin on the chin and adjacent to the left nare.  No visible vesicles.  Eyes:      General: Gaze aligned appropriately.   Cardiovascular:      Rate and Rhythm: Normal rate and regular rhythm.      Heart sounds: Normal heart sounds, S1 normal and S2 normal.   Pulmonary:      Effort: Pulmonary effort is normal. No respiratory distress.      Breath sounds: Normal breath sounds. No stridor. No decreased breath sounds, wheezing, rhonchi or rales.   Musculoskeletal:      Cervical back: Neck supple.   Lymphadenopathy:      Cervical: No cervical adenopathy.      Right cervical: No superficial or posterior cervical adenopathy.     Left cervical: No superficial or posterior cervical adenopathy.   Skin:     General: Skin is warm and dry.      Capillary Refill: Capillary refill takes less than 2 seconds.   Neurological:      Mental Status: She is alert and oriented to person, place, and time.      Comments: CN2-12 grossly intact   Psychiatric:         Speech: Speech normal.         Behavior: Behavior normal.           Assessment/Plan:   1. Non-recurrent acute suppurative otitis media of left ear without spontaneous rupture of tympanic membrane  - cefdinir (OMNICEF) 300 MG Cap; Take 1 capsule by mouth 2 times a day for 5 days.  Dispense: 10 capsule; Refill: 0  - cetirizine (ZYRTEC ALLERGY)  10 MG Tab; Take 1 tablet by mouth every day.  Dispense: 30 tablet; Refill: 0  - budesonide (RINOCORT AQUA) 32 MCG/ACT nasal spray; Administer 2 Sprays into affected nostril(S) every day.  Dispense: 5 mL; Refill: 0    2. Cold sore  - valacyclovir (VALTREX) 1 GM Tab; Take 2 Tablets by mouth 2 times a day for 1 day.  Dispense: 4 tablet; Refill: 0    3. Sore throat  - POCT Rapid Strep A    4. 22 weeks gestation of pregnancy    1.  Physical exam consistent with left-sided acute otitis media.  It is likely the patient's headaches are related to this.  She does have a cold sore, but there are no other lesions to suggest that this is zoster.  As patient is pregnant, I would like patient to first try an antihistamine as well as an intranasal steroid.  If no improvement in the next 48 hours or worsening of symptoms patient to begin antibiotic as prescribed.  Follow-up with PCP.    2.  Patient's cold sore has been present for quite some time and is very painful.  We will treat with twice daily Valtrex for 1 day.  Follow-up with PCP.    If patient symptoms fail to improve, worsen at any point, or new symptoms develop any point I would like her to seek medical reevaluation with PCP or return to urgent care for reevaluation.  Differential diagnosis, natural history, supportive care, and indications for immediate follow-up discussed.

## 2021-03-23 ENCOUNTER — HOSPITAL ENCOUNTER (OUTPATIENT)
Dept: LAB | Facility: MEDICAL CENTER | Age: 31
End: 2021-03-23
Attending: OBSTETRICS & GYNECOLOGY
Payer: COMMERCIAL

## 2021-03-23 LAB
GLUCOSE 1H P 50 G GLC PO SERPL-MCNC: 129 MG/DL (ref 70–139)
HCT VFR BLD AUTO: 36.1 % (ref 37–47)
HGB BLD-MCNC: 12.5 G/DL (ref 12–16)
PLATELET # BLD AUTO: 228 K/UL (ref 164–446)
TREPONEMA PALLIDUM IGG+IGM AB [PRESENCE] IN SERUM OR PLASMA BY IMMUNOASSAY: NORMAL

## 2021-03-23 PROCEDURE — 85014 HEMATOCRIT: CPT

## 2021-03-23 PROCEDURE — 85018 HEMOGLOBIN: CPT

## 2021-03-23 PROCEDURE — 82950 GLUCOSE TEST: CPT

## 2021-03-23 PROCEDURE — 85049 AUTOMATED PLATELET COUNT: CPT

## 2021-03-23 PROCEDURE — 86780 TREPONEMA PALLIDUM: CPT

## 2021-04-27 ENCOUNTER — OFFICE VISIT (OUTPATIENT)
Dept: URGENT CARE | Facility: PHYSICIAN GROUP | Age: 31
End: 2021-04-27
Payer: COMMERCIAL

## 2021-04-27 ENCOUNTER — HOSPITAL ENCOUNTER (OUTPATIENT)
Facility: MEDICAL CENTER | Age: 31
End: 2021-04-27
Attending: FAMILY MEDICINE
Payer: COMMERCIAL

## 2021-04-27 VITALS
DIASTOLIC BLOOD PRESSURE: 86 MMHG | HEIGHT: 64 IN | SYSTOLIC BLOOD PRESSURE: 132 MMHG | BODY MASS INDEX: 35.58 KG/M2 | RESPIRATION RATE: 16 BRPM | HEART RATE: 102 BPM | TEMPERATURE: 98.9 F | OXYGEN SATURATION: 100 % | WEIGHT: 208.4 LBS

## 2021-04-27 DIAGNOSIS — J02.9 SORE THROAT: ICD-10-CM

## 2021-04-27 LAB
HETEROPH AB SER QL LA: NEGATIVE
INT CON NEG: NEGATIVE
INT CON NEG: NEGATIVE
INT CON POS: POSITIVE
INT CON POS: POSITIVE
S PYO AG THROAT QL: NEGATIVE

## 2021-04-27 PROCEDURE — U0005 INFEC AGEN DETEC AMPLI PROBE: HCPCS

## 2021-04-27 PROCEDURE — 87880 STREP A ASSAY W/OPTIC: CPT | Performed by: FAMILY MEDICINE

## 2021-04-27 PROCEDURE — 99213 OFFICE O/P EST LOW 20 MIN: CPT | Performed by: FAMILY MEDICINE

## 2021-04-27 PROCEDURE — U0003 INFECTIOUS AGENT DETECTION BY NUCLEIC ACID (DNA OR RNA); SEVERE ACUTE RESPIRATORY SYNDROME CORONAVIRUS 2 (SARS-COV-2) (CORONAVIRUS DISEASE [COVID-19]), AMPLIFIED PROBE TECHNIQUE, MAKING USE OF HIGH THROUGHPUT TECHNOLOGIES AS DESCRIBED BY CMS-2020-01-R: HCPCS

## 2021-04-27 PROCEDURE — 86308 HETEROPHILE ANTIBODY SCREEN: CPT | Performed by: FAMILY MEDICINE

## 2021-04-27 ASSESSMENT — FIBROSIS 4 INDEX: FIB4 SCORE: 0.53

## 2021-04-27 NOTE — PROGRESS NOTES
"Subjective:      Natahsa Pino is a 30 y.o. female who presents with Pharyngitis (Cough, fatigue and sore throat would like a strep culture)            This is a new problem.  30-year-old presenting for evaluation of sore throat for the past few days, cough started couple days ago and feeling feverish and fatigue for the past couple of days.  She is in her third trimester.  No other ill exposure contact reported.  Review of systems negative.  He is also concerned about Covid.      Review of Systems   All other systems reviewed and are negative.         Objective:     /86   Pulse (!) 102   Temp 37.2 °C (98.9 °F) (Temporal)   Resp 16   Ht 1.626 m (5' 4\")   Wt 94.5 kg (208 lb 6.4 oz)   LMP 02/19/2020 (Exact Date)   SpO2 100%   BMI 35.77 kg/m²      Physical Exam  Constitutional:       General: She is not in acute distress.     Appearance: She is not ill-appearing, toxic-appearing or diaphoretic.   HENT:      Head: Normocephalic and atraumatic.      Right Ear: Tympanic membrane, ear canal and external ear normal.      Left Ear: Tympanic membrane, ear canal and external ear normal.      Nose: No rhinorrhea.      Mouth/Throat:      Mouth: Mucous membranes are moist.      Pharynx: Oropharynx is clear. Uvula midline. No pharyngeal swelling, oropharyngeal exudate, posterior oropharyngeal erythema or uvula swelling.      Tonsils: No tonsillar exudate.   Eyes:      Conjunctiva/sclera: Conjunctivae normal.   Cardiovascular:      Rate and Rhythm: Normal rate and regular rhythm.      Heart sounds: No murmur. No friction rub. No gallop.    Pulmonary:      Effort: Pulmonary effort is normal. No respiratory distress.      Breath sounds: No stridor. No wheezing, rhonchi or rales.   Musculoskeletal:      Cervical back: Neck supple.   Skin:     General: Skin is warm.      Coloration: Skin is not jaundiced or pale.      Findings: No rash.   Neurological:      Mental Status: She is alert and oriented to person, " place, and time.   Psychiatric:         Thought Content: Thought content normal.             Results for orders placed or performed in visit on 04/27/21   POCT Mononucleosis (mono)   Result Value Ref Range    Heterophile Screen Negative     Internal Control Positive Positive     Internal Control Negative Negative    POCT Rapid Strep A   Result Value Ref Range    Rapid Strep Screen Negative     Internal Control Positive Positive     Internal Control Negative Negative           Assessment/Plan:        1. Sore throat  - POCT Mononucleosis (mono)  - POCT Rapid Strep A  - COVID/SARS CoV-2 PCR; Future    Benign exam.  Negative mono and strep.  Likely viral illness.  Salt water gargles recommend.  Honey as needed for cough.  Covid pending but likely low yield.  Warning signs reviewed  Plan per orders and instructions

## 2021-04-28 DIAGNOSIS — J02.9 SORE THROAT: ICD-10-CM

## 2021-07-14 ENCOUNTER — APPOINTMENT (OUTPATIENT)
Dept: URGENT CARE | Facility: PHYSICIAN GROUP | Age: 31
End: 2021-07-14

## 2021-07-17 ENCOUNTER — OFFICE VISIT (OUTPATIENT)
Dept: URGENT CARE | Facility: PHYSICIAN GROUP | Age: 31
End: 2021-07-17
Payer: COMMERCIAL

## 2021-07-17 ENCOUNTER — HOSPITAL ENCOUNTER (OUTPATIENT)
Dept: RADIOLOGY | Facility: MEDICAL CENTER | Age: 31
End: 2021-07-17
Attending: PHYSICIAN ASSISTANT
Payer: COMMERCIAL

## 2021-07-17 VITALS
DIASTOLIC BLOOD PRESSURE: 84 MMHG | HEART RATE: 113 BPM | BODY MASS INDEX: 31.76 KG/M2 | WEIGHT: 186 LBS | OXYGEN SATURATION: 97 % | TEMPERATURE: 99 F | SYSTOLIC BLOOD PRESSURE: 140 MMHG | RESPIRATION RATE: 16 BRPM | HEIGHT: 64 IN

## 2021-07-17 DIAGNOSIS — I82.612 ACUTE EMBOLISM AND THROMBOSIS OF SUPERFICIAL VEIN OF LEFT UPPER EXTREMITY: ICD-10-CM

## 2021-07-17 DIAGNOSIS — M79.602 PAIN OF LEFT UPPER EXTREMITY: ICD-10-CM

## 2021-07-17 PROCEDURE — 99214 OFFICE O/P EST MOD 30 MIN: CPT | Performed by: PHYSICIAN ASSISTANT

## 2021-07-17 PROCEDURE — 93971 EXTREMITY STUDY: CPT | Mod: LT

## 2021-07-17 RX ORDER — AMOXICILLIN AND CLAVULANATE POTASSIUM 875; 125 MG/1; MG/1
TABLET, FILM COATED ORAL
COMMUNITY
Start: 2021-07-15 | End: 2021-07-24

## 2021-07-17 ASSESSMENT — ENCOUNTER SYMPTOMS
COUGH: 0
FEVER: 0
BRUISES/BLEEDS EASILY: 0
SHORTNESS OF BREATH: 0
CHILLS: 0

## 2021-07-17 ASSESSMENT — FIBROSIS 4 INDEX: FIB4 SCORE: 0.54

## 2021-07-17 NOTE — PROGRESS NOTES
"Subjective:   Natasha Pino  is a 31 y.o. female who presents for Other (pt states swelling in L arm)      Other  Pertinent negatives include no chest pain, chills, coughing, fever or rash.   Patient presents urgent care 1/2 weeks postpartum.  She is concerned with the possibility of DVT to left arm.  She notes mild tenderness to left forearm at site of prior IV started.  Additionally more proximally on left arm, just distal to antecubital space she has a prominent and palpable vein that slightly firm.  She notes mild localized tender edema in this area as well.  Denies much erythema.  Notes some slight warmth.  She denies fevers chills.  Denies past medical history of DVT or PE; denies family history of DVT or PE.  Denies swelling of left upper extremity or any full limb.  Denies history of clotting or bleeding disorders.  Has tried warm compress with minimal change in symptoms.  Denies abnormal chest pain or shortness of breath.  Of note patient is being treated with antibiotic for mastitis.    Review of Systems   Constitutional: Negative for chills and fever.   Respiratory: Negative for cough and shortness of breath.    Cardiovascular: Negative for chest pain.   Musculoskeletal:        Left arm pain   Skin: Negative for rash.   Endo/Heme/Allergies: Does not bruise/bleed easily.       Allergies   Allergen Reactions   • Keppra [Levetiracetam] Unspecified     Severe Depression    • Mushroom Extract Complex Anaphylaxis     Pt reports her throat swells        Objective:   /84 (BP Location: Right arm, Patient Position: Sitting, BP Cuff Size: Adult)   Pulse (!) 113   Temp 37.2 °C (99 °F) (Temporal)   Resp 16   Ht 1.626 m (5' 4\")   Wt 84.4 kg (186 lb)   LMP 02/19/2020 (Exact Date)   SpO2 97%   BMI 31.93 kg/m²     Physical Exam  Vitals and nursing note reviewed.   Constitutional:       General: She is not in acute distress.     Appearance: She is well-developed. She is not diaphoretic.   HENT:     "  Head: Normocephalic and atraumatic.      Right Ear: External ear normal.      Left Ear: External ear normal.      Nose: Nose normal.   Eyes:      General: Lids are normal. No scleral icterus.        Right eye: No discharge.         Left eye: No discharge.      Conjunctiva/sclera: Conjunctivae normal.   Pulmonary:      Effort: Pulmonary effort is normal. No respiratory distress.   Musculoskeletal:         General: Normal range of motion.      Cervical back: Neck supple.   Skin:     General: Skin is warm and dry.      Coloration: Skin is not pale.      Findings: No erythema.   Neurological:      Mental Status: She is alert and oriented to person, place, and time. She is not disoriented.   Psychiatric:         Speech: Speech normal.         Behavior: Behavior normal.     US left upper ext - FINDINGS:   Left upper extremity.    Subacute superficial venous thrombosis is seen in the cephalic vein from    proximal forearm to distal forearm. Cephalic vein is compressable at the    wrist.    No deep venous thrombosis.    Complete color filling and compressibility with normal venous flow dynamics    including spontaneous flow, response to augmentation maneuvers, and    respiratory phasicity is seen in the remaining visualized vessels.      Flow was evaluated in the contralateral subclavian vein and normal venous    flow dynamics including spontaneous flow, respiratory phasic variation and    augmentation were demonstrated.    Assessment/Plan:   1. Acute embolism and thrombosis of superficial vein of left upper extremity    2. Pain of left upper extremity  - US-EXTREMITY VENOUS UPPER UNILAT LEFT; Future    Other orders  - amoxicillin-clavulanate (AUGMENTIN) 875-125 MG Tab; TAKE 1 TABLET BY MOUTH TWICE DAILY UNTIL GONE  -Recommendation continue warm compresses  -Be vigilant for other signs of embolism (reviewed with patient )  -Return to clinic with lack of resolution or progression of symptoms.  -ER precautions with any  worsening symptoms are reviewed with patient/caregiver and they do express understanding  -f/u w/ OB    I have worn an N95 mask, gloves and eye protection for the entire encounter with this patient.     Differential diagnosis, natural history, supportive care, and indications for immediate follow-up discussed.    My total time spent caring for the patient on the day of the encounter was 30 minutes.   This does not include time spent on separately billable procedures/tests.

## 2021-07-19 DIAGNOSIS — I82.612 SUPERFICIAL VENOUS THROMBOSIS OF LEFT UPPER EXTREMITY: ICD-10-CM

## 2021-07-20 ENCOUNTER — HOSPITAL ENCOUNTER (OUTPATIENT)
Dept: RADIOLOGY | Facility: MEDICAL CENTER | Age: 31
End: 2021-07-20
Attending: NURSE PRACTITIONER
Payer: COMMERCIAL

## 2021-07-20 DIAGNOSIS — I82.612 SUPERFICIAL VENOUS THROMBOSIS OF LEFT UPPER EXTREMITY: ICD-10-CM

## 2021-07-20 PROCEDURE — 93971 EXTREMITY STUDY: CPT | Mod: LT

## 2021-07-24 ENCOUNTER — HOSPITAL ENCOUNTER (OUTPATIENT)
Facility: MEDICAL CENTER | Age: 31
End: 2021-07-24
Attending: PHYSICIAN ASSISTANT
Payer: COMMERCIAL

## 2021-07-24 ENCOUNTER — OFFICE VISIT (OUTPATIENT)
Dept: URGENT CARE | Facility: PHYSICIAN GROUP | Age: 31
End: 2021-07-24
Payer: COMMERCIAL

## 2021-07-24 VITALS
DIASTOLIC BLOOD PRESSURE: 80 MMHG | OXYGEN SATURATION: 97 % | WEIGHT: 186 LBS | HEART RATE: 91 BPM | BODY MASS INDEX: 31.76 KG/M2 | TEMPERATURE: 100.6 F | HEIGHT: 64 IN | SYSTOLIC BLOOD PRESSURE: 140 MMHG | RESPIRATION RATE: 20 BRPM

## 2021-07-24 DIAGNOSIS — J02.9 SORE THROAT: ICD-10-CM

## 2021-07-24 DIAGNOSIS — R50.9 FEVER, UNSPECIFIED FEVER CAUSE: ICD-10-CM

## 2021-07-24 DIAGNOSIS — B34.9 NONSPECIFIC SYNDROME SUGGESTIVE OF VIRAL ILLNESS: ICD-10-CM

## 2021-07-24 DIAGNOSIS — R05.9 COUGH: ICD-10-CM

## 2021-07-24 LAB
FLUAV+FLUBV AG SPEC QL IA: NORMAL
INT CON NEG: NORMAL
INT CON NEG: NORMAL
INT CON POS: NORMAL
INT CON POS: NORMAL
S PYO AG THROAT QL: NORMAL

## 2021-07-24 PROCEDURE — 87880 STREP A ASSAY W/OPTIC: CPT | Performed by: PHYSICIAN ASSISTANT

## 2021-07-24 PROCEDURE — 99214 OFFICE O/P EST MOD 30 MIN: CPT | Performed by: PHYSICIAN ASSISTANT

## 2021-07-24 PROCEDURE — 0240U HCHG SARS-COV-2 COVID-19 NFCT DS RESP RNA 3 TRGT MIC: CPT

## 2021-07-24 PROCEDURE — 87804 INFLUENZA ASSAY W/OPTIC: CPT | Performed by: PHYSICIAN ASSISTANT

## 2021-07-24 RX ORDER — ONDANSETRON 4 MG/1
TABLET, FILM COATED ORAL
COMMUNITY
Start: 2021-07-23 | End: 2021-07-24

## 2021-07-24 RX ORDER — NITROFURANTOIN 25; 75 MG/1; MG/1
CAPSULE ORAL
COMMUNITY
Start: 2021-06-01 | End: 2021-07-24

## 2021-07-24 ASSESSMENT — ENCOUNTER SYMPTOMS
FEVER: 1
SHORTNESS OF BREATH: 0
ABDOMINAL PAIN: 0
NAUSEA: 0
MYALGIAS: 1
SORE THROAT: 1
VOMITING: 0
COUGH: 1

## 2021-07-24 ASSESSMENT — FIBROSIS 4 INDEX: FIB4 SCORE: 0.54

## 2021-07-24 NOTE — PROGRESS NOTES
Subjective:   Natasha Pino is a 31 y.o. female who presents for Illness (last night started with fever, cough, chills, body aches. )        Patient is approximately 3 weeks postpartum developed sudden onset fever, sore throat, dry cough and body aches last night.  T-max 101.5F this morning.  States that it responded well to Tylenol at approximately 2 AM.  Throat pain mildly exacerbated by eating and coughing.  No difficulty or discomfort with drinking.  She denies nasal congestion, chest pain, shortness of breath, abdominal pain, abnormal vaginal bleeding, breast pain, lightheadedness.  She has not been vaccinated against COVID-19.        Review of Systems   Constitutional: Positive for fever and malaise/fatigue.   HENT: Positive for sore throat.    Respiratory: Positive for cough. Negative for shortness of breath.    Cardiovascular: Negative for chest pain.   Gastrointestinal: Negative for abdominal pain, nausea and vomiting.   Genitourinary: Negative.    Musculoskeletal: Positive for myalgias.       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 She also has no past medical history of Anxiety, Blood transfusion without reported diagnosis, Clotting disorder ([facility]), GERD (gastroesophageal reflux disease), Hyperlipidemia, Hypertension, IBD (inflammatory bowel disease), Muscle disorder, Osteoporosis, or Substance abuse ([facility]).  MEDS: No current outpatient medications on file.  ALLERGIES:   Allergies   Allergen Reactions   • Keppra [Levetiracetam] Unspecified     Severe Depression    • Mushroom Extract Complex Anaphylaxis     Pt reports her throat swells     SURGHX:   Past Surgical History:   Procedure Laterality Date   • LEEP  2011   • APPENDECTOMY       SOCHX:  reports that she has never smoked. She has never used smokeless tobacco.  "She reports previous alcohol use. She reports previous drug use. Drug: Marijuana.  FH: Family history was reviewed, no pertinent findings to report   Objective:   /80 (BP Location: Right arm, Patient Position: Sitting, BP Cuff Size: Adult)   Pulse 91   Temp (!) 38.1 °C (100.6 °F) (Temporal)   Resp 20   Ht 1.626 m (5' 4\")   Wt 84.4 kg (186 lb)   LMP 02/19/2020 (Exact Date)   SpO2 97%   BMI 31.93 kg/m²   Physical Exam  Vitals reviewed.   Constitutional:       General: She is not in acute distress.     Appearance: Normal appearance. She is well-developed. She is not toxic-appearing.   HENT:      Head: Normocephalic and atraumatic.      Right Ear: Tympanic membrane, ear canal and external ear normal.      Left Ear: Tympanic membrane, ear canal and external ear normal.      Nose: Nose normal. No mucosal edema, congestion or rhinorrhea.      Mouth/Throat:      Lips: Pink.      Mouth: Mucous membranes are moist.      Pharynx: Oropharynx is clear. Uvula midline. Posterior oropharyngeal erythema present.   Eyes:      General: Gaze aligned appropriately.   Cardiovascular:      Rate and Rhythm: Normal rate and regular rhythm.      Heart sounds: Normal heart sounds, S1 normal and S2 normal.   Pulmonary:      Effort: Pulmonary effort is normal. No respiratory distress.      Breath sounds: Normal breath sounds. No stridor. No decreased breath sounds, wheezing, rhonchi or rales.   Musculoskeletal:      Cervical back: Neck supple.   Lymphadenopathy:      Cervical: No cervical adenopathy.      Right cervical: No superficial cervical adenopathy.     Left cervical: No superficial cervical adenopathy.   Skin:     General: Skin is warm and dry.      Capillary Refill: Capillary refill takes less than 2 seconds.   Neurological:      Mental Status: She is alert and oriented to person, place, and time.      Comments: CN2-12 grossly intact   Psychiatric:         Speech: Speech normal.         Behavior: Behavior normal.         "   Assessment/Plan:   1. Nonspecific syndrome suggestive of viral illness    2. Sore throat  - POCT Rapid Strep A  - POCT Influenza A/B  - CoV-2 and Flu A/B by PCR (Roche/TF); Future    3. Fever, unspecified fever cause  - POCT Rapid Strep A  - POCT Influenza A/B  - CoV-2 and Flu A/B by PCR (Roche/TF); Future    4. Cough  - CoV-2 and Flu A/B by PCR (Roche/TF); Future    Point-of-care strep and flu testing is negative.  Patient advised that symptoms most likely viral in nature.  Will obtain PCR testing for COVID-19 and flu A/B.    Symptomatic and supportive care:   Plenty of oral hydration and rest   Over the counter cough suppressant as directed.  Tylenol or ibuprofen for pain and fever as directed.   Saline nasal spray and Flonase as a decongestant.   Infection control measures at home. Stay away from people, Hand washing, covering sneeze/cough, disinfect surfaces.   Remain home from work, school, and other populated environments.     Differential diagnosis, natural history, supportive care, and indications for immediate follow-up discussed.

## 2021-07-26 LAB
FLUAV RNA SPEC QL NAA+PROBE: NEGATIVE
FLUBV RNA SPEC QL NAA+PROBE: NEGATIVE
SARS-COV-2 RNA RESP QL NAA+PROBE: DETECTED
SPECIMEN SOURCE: ABNORMAL

## 2021-10-26 ENCOUNTER — DOCUMENTATION (OUTPATIENT)
Dept: VASCULAR LAB | Facility: MEDICAL CENTER | Age: 31
End: 2021-10-26

## 2021-10-26 DIAGNOSIS — R42 DIZZINESS: ICD-10-CM

## 2021-10-26 DIAGNOSIS — R06.02 SHORTNESS OF BREATH WITH EXPOSURE TO COVID-19 VIRUS: ICD-10-CM

## 2021-10-26 DIAGNOSIS — Z20.822 SHORTNESS OF BREATH WITH EXPOSURE TO COVID-19 VIRUS: ICD-10-CM

## 2021-10-26 DIAGNOSIS — I82.612 SUPERFICIAL VENOUS THROMBOSIS OF ARM, LEFT: ICD-10-CM

## 2021-10-27 ENCOUNTER — HOSPITAL ENCOUNTER (OUTPATIENT)
Dept: LAB | Facility: MEDICAL CENTER | Age: 31
End: 2021-10-27
Attending: NURSE PRACTITIONER
Payer: COMMERCIAL

## 2021-10-27 DIAGNOSIS — Z20.822 SHORTNESS OF BREATH WITH EXPOSURE TO COVID-19 VIRUS: ICD-10-CM

## 2021-10-27 DIAGNOSIS — R06.02 SHORTNESS OF BREATH WITH EXPOSURE TO COVID-19 VIRUS: ICD-10-CM

## 2021-10-27 DIAGNOSIS — R42 DIZZINESS: ICD-10-CM

## 2021-10-27 DIAGNOSIS — I82.612 SUPERFICIAL VENOUS THROMBOSIS OF ARM, LEFT: ICD-10-CM

## 2021-10-27 LAB
ALBUMIN SERPL BCP-MCNC: 4.8 G/DL (ref 3.2–4.9)
ALBUMIN/GLOB SERPL: 1.7 G/DL
ALP SERPL-CCNC: 84 U/L (ref 30–99)
ALT SERPL-CCNC: 56 U/L (ref 2–50)
ANION GAP SERPL CALC-SCNC: 12 MMOL/L (ref 7–16)
AST SERPL-CCNC: 27 U/L (ref 12–45)
BASOPHILS # BLD AUTO: 0.4 % (ref 0–1.8)
BASOPHILS # BLD: 0.03 K/UL (ref 0–0.12)
BILIRUB SERPL-MCNC: 0.4 MG/DL (ref 0.1–1.5)
BUN SERPL-MCNC: 17 MG/DL (ref 8–22)
CALCIUM SERPL-MCNC: 9.8 MG/DL (ref 8.5–10.5)
CHLORIDE SERPL-SCNC: 104 MMOL/L (ref 96–112)
CO2 SERPL-SCNC: 23 MMOL/L (ref 20–33)
CREAT SERPL-MCNC: 0.83 MG/DL (ref 0.5–1.4)
D DIMER PPP IA.FEU-MCNC: <0.27 UG/ML (FEU) (ref 0–0.5)
EOSINOPHIL # BLD AUTO: 0.09 K/UL (ref 0–0.51)
EOSINOPHIL NFR BLD: 1.3 % (ref 0–6.9)
ERYTHROCYTE [DISTWIDTH] IN BLOOD BY AUTOMATED COUNT: 46.3 FL (ref 35.9–50)
GLOBULIN SER CALC-MCNC: 2.9 G/DL (ref 1.9–3.5)
GLUCOSE SERPL-MCNC: 100 MG/DL (ref 65–99)
HCT VFR BLD AUTO: 46.7 % (ref 37–47)
HGB BLD-MCNC: 15.5 G/DL (ref 12–16)
IMM GRANULOCYTES # BLD AUTO: 0.02 K/UL (ref 0–0.11)
IMM GRANULOCYTES NFR BLD AUTO: 0.3 % (ref 0–0.9)
LYMPHOCYTES # BLD AUTO: 1.6 K/UL (ref 1–4.8)
LYMPHOCYTES NFR BLD: 23.7 % (ref 22–41)
MCH RBC QN AUTO: 27.9 PG (ref 27–33)
MCHC RBC AUTO-ENTMCNC: 33.2 G/DL (ref 33.6–35)
MCV RBC AUTO: 84.1 FL (ref 81.4–97.8)
MONOCYTES # BLD AUTO: 0.62 K/UL (ref 0–0.85)
MONOCYTES NFR BLD AUTO: 9.2 % (ref 0–13.4)
NEUTROPHILS # BLD AUTO: 4.39 K/UL (ref 2–7.15)
NEUTROPHILS NFR BLD: 65.1 % (ref 44–72)
NRBC # BLD AUTO: 0 K/UL
NRBC BLD-RTO: 0 /100 WBC
PLATELET # BLD AUTO: 341 K/UL (ref 164–446)
PMV BLD AUTO: 10.2 FL (ref 9–12.9)
POTASSIUM SERPL-SCNC: 4.3 MMOL/L (ref 3.6–5.5)
PROT SERPL-MCNC: 7.7 G/DL (ref 6–8.2)
RBC # BLD AUTO: 5.55 M/UL (ref 4.2–5.4)
SODIUM SERPL-SCNC: 139 MMOL/L (ref 135–145)
WBC # BLD AUTO: 6.8 K/UL (ref 4.8–10.8)

## 2021-10-27 PROCEDURE — 85025 COMPLETE CBC W/AUTO DIFF WBC: CPT

## 2021-10-27 PROCEDURE — 85379 FIBRIN DEGRADATION QUANT: CPT

## 2021-10-27 PROCEDURE — 36415 COLL VENOUS BLD VENIPUNCTURE: CPT

## 2021-10-27 PROCEDURE — 80053 COMPREHEN METABOLIC PANEL: CPT

## 2021-11-03 ENCOUNTER — TELEPHONE (OUTPATIENT)
Dept: SCHEDULING | Facility: IMAGING CENTER | Age: 31
End: 2021-11-03

## 2021-11-09 ENCOUNTER — OFFICE VISIT (OUTPATIENT)
Dept: MEDICAL GROUP | Facility: PHYSICIAN GROUP | Age: 31
End: 2021-11-09
Payer: COMMERCIAL

## 2021-11-09 VITALS
BODY MASS INDEX: 33.09 KG/M2 | WEIGHT: 193.8 LBS | OXYGEN SATURATION: 97 % | HEART RATE: 86 BPM | DIASTOLIC BLOOD PRESSURE: 88 MMHG | TEMPERATURE: 97.8 F | HEIGHT: 64 IN | RESPIRATION RATE: 16 BRPM | SYSTOLIC BLOOD PRESSURE: 128 MMHG

## 2021-11-09 DIAGNOSIS — M94.0 COSTOCHONDRITIS: ICD-10-CM

## 2021-11-09 DIAGNOSIS — G40.209 LOCALIZATION-RELATED PARTIAL EPILEPSY WITH COMPLEX PARTIAL SEIZURES (HCC): ICD-10-CM

## 2021-11-09 DIAGNOSIS — Z86.16 HISTORY OF COVID-19: ICD-10-CM

## 2021-11-09 DIAGNOSIS — L65.9 HAIR LOSS: ICD-10-CM

## 2021-11-09 DIAGNOSIS — E66.9 OBESITY (BMI 30-39.9): ICD-10-CM

## 2021-11-09 DIAGNOSIS — I82.612 SUPERFICIAL VENOUS THROMBOSIS OF ARM, LEFT: ICD-10-CM

## 2021-11-09 PROBLEM — F32.5 MAJOR DEPRESSIVE DISORDER WITH SINGLE EPISODE, IN FULL REMISSION (HCC): Status: ACTIVE | Noted: 2017-08-02

## 2021-11-09 PROBLEM — Z20.822 SHORTNESS OF BREATH WITH EXPOSURE TO COVID-19 VIRUS: Status: RESOLVED | Noted: 2021-10-26 | Resolved: 2021-11-09

## 2021-11-09 PROBLEM — R06.02 SHORTNESS OF BREATH WITH EXPOSURE TO COVID-19 VIRUS: Status: RESOLVED | Noted: 2021-10-26 | Resolved: 2021-11-09

## 2021-11-09 PROBLEM — R55 SYNCOPE AND COLLAPSE: Status: RESOLVED | Noted: 2017-02-13 | Resolved: 2021-11-09

## 2021-11-09 PROBLEM — R42 DIZZINESS: Status: RESOLVED | Noted: 2021-10-26 | Resolved: 2021-11-09

## 2021-11-09 PROBLEM — Z78.9 USES BIRTH CONTROL: Status: RESOLVED | Noted: 2017-02-13 | Resolved: 2021-11-09

## 2021-11-09 PROCEDURE — 99214 OFFICE O/P EST MOD 30 MIN: CPT | Performed by: FAMILY MEDICINE

## 2021-11-09 RX ORDER — SERTRALINE HYDROCHLORIDE 25 MG/1
TABLET, FILM COATED ORAL
COMMUNITY
Start: 2021-08-20 | End: 2021-11-09

## 2021-11-09 SDOH — ECONOMIC STABILITY: HOUSING INSECURITY
IN THE LAST 12 MONTHS, WAS THERE A TIME WHEN YOU DID NOT HAVE A STEADY PLACE TO SLEEP OR SLEPT IN A SHELTER (INCLUDING NOW)?: YES

## 2021-11-09 SDOH — HEALTH STABILITY: PHYSICAL HEALTH: ON AVERAGE, HOW MANY DAYS PER WEEK DO YOU ENGAGE IN MODERATE TO STRENUOUS EXERCISE (LIKE A BRISK WALK)?: 3 DAYS

## 2021-11-09 SDOH — HEALTH STABILITY: PHYSICAL HEALTH: ON AVERAGE, HOW MANY MINUTES DO YOU ENGAGE IN EXERCISE AT THIS LEVEL?: 20 MIN

## 2021-11-09 SDOH — ECONOMIC STABILITY: FOOD INSECURITY: WITHIN THE PAST 12 MONTHS, YOU WORRIED THAT YOUR FOOD WOULD RUN OUT BEFORE YOU GOT MONEY TO BUY MORE.: NEVER TRUE

## 2021-11-09 SDOH — ECONOMIC STABILITY: TRANSPORTATION INSECURITY
IN THE PAST 12 MONTHS, HAS THE LACK OF TRANSPORTATION KEPT YOU FROM MEDICAL APPOINTMENTS OR FROM GETTING MEDICATIONS?: NO

## 2021-11-09 SDOH — ECONOMIC STABILITY: INCOME INSECURITY: IN THE LAST 12 MONTHS, WAS THERE A TIME WHEN YOU WERE NOT ABLE TO PAY THE MORTGAGE OR RENT ON TIME?: YES

## 2021-11-09 SDOH — ECONOMIC STABILITY: INCOME INSECURITY: HOW HARD IS IT FOR YOU TO PAY FOR THE VERY BASICS LIKE FOOD, HOUSING, MEDICAL CARE, AND HEATING?: NOT VERY HARD

## 2021-11-09 SDOH — ECONOMIC STABILITY: HOUSING INSECURITY
IN THE LAST 12 MONTHS, WAS THERE A TIME WHEN YOU DID NOT HAVE A STEADY PLACE TO SLEEP OR SLEPT IN A SHELTER (INCLUDING NOW)?: NO

## 2021-11-09 SDOH — ECONOMIC STABILITY: HOUSING INSECURITY: IN THE LAST 12 MONTHS, HOW MANY PLACES HAVE YOU LIVED?: 2

## 2021-11-09 SDOH — ECONOMIC STABILITY: FOOD INSECURITY: WITHIN THE PAST 12 MONTHS, THE FOOD YOU BOUGHT JUST DIDN'T LAST AND YOU DIDN'T HAVE MONEY TO GET MORE.: NEVER TRUE

## 2021-11-09 SDOH — ECONOMIC STABILITY: TRANSPORTATION INSECURITY
IN THE PAST 12 MONTHS, HAS LACK OF TRANSPORTATION KEPT YOU FROM MEETINGS, WORK, OR FROM GETTING THINGS NEEDED FOR DAILY LIVING?: NO

## 2021-11-09 SDOH — HEALTH STABILITY: MENTAL HEALTH
STRESS IS WHEN SOMEONE FEELS TENSE, NERVOUS, ANXIOUS, OR CAN'T SLEEP AT NIGHT BECAUSE THEIR MIND IS TROUBLED. HOW STRESSED ARE YOU?: TO SOME EXTENT

## 2021-11-09 SDOH — ECONOMIC STABILITY: TRANSPORTATION INSECURITY
IN THE PAST 12 MONTHS, HAS LACK OF RELIABLE TRANSPORTATION KEPT YOU FROM MEDICAL APPOINTMENTS, MEETINGS, WORK OR FROM GETTING THINGS NEEDED FOR DAILY LIVING?: NO

## 2021-11-09 ASSESSMENT — ENCOUNTER SYMPTOMS
VOMITING: 0
NAUSEA: 0
DIZZINESS: 0
SHORTNESS OF BREATH: 1
FEVER: 0
MYALGIAS: 0
SORE THROAT: 0
CHILLS: 0
BLURRED VISION: 0

## 2021-11-09 ASSESSMENT — SOCIAL DETERMINANTS OF HEALTH (SDOH)
HOW OFTEN DO YOU GET TOGETHER WITH FRIENDS OR RELATIVES?: ONCE A WEEK
HOW OFTEN DO YOU HAVE SIX OR MORE DRINKS ON ONE OCCASION: LESS THAN MONTHLY
HOW OFTEN DO YOU GET TOGETHER WITH FRIENDS OR RELATIVES?: ONCE A WEEK
HOW MANY DRINKS CONTAINING ALCOHOL DO YOU HAVE ON A TYPICAL DAY WHEN YOU ARE DRINKING: 1 OR 2
HOW OFTEN DO YOU ATTEND CHURCH OR RELIGIOUS SERVICES?: NEVER
WITHIN THE PAST 12 MONTHS, YOU WORRIED THAT YOUR FOOD WOULD RUN OUT BEFORE YOU GOT THE MONEY TO BUY MORE: NEVER TRUE
HOW OFTEN DO YOU HAVE A DRINK CONTAINING ALCOHOL: 2-4 TIMES A MONTH
HOW OFTEN DO YOU ATTEND CHURCH OR RELIGIOUS SERVICES?: NEVER
DO YOU BELONG TO ANY CLUBS OR ORGANIZATIONS SUCH AS CHURCH GROUPS UNIONS, FRATERNAL OR ATHLETIC GROUPS, OR SCHOOL GROUPS?: NO
DO YOU BELONG TO ANY CLUBS OR ORGANIZATIONS SUCH AS CHURCH GROUPS UNIONS, FRATERNAL OR ATHLETIC GROUPS, OR SCHOOL GROUPS?: NO
HOW OFTEN DO YOU ATTENT MEETINGS OF THE CLUB OR ORGANIZATION YOU BELONG TO?: PATIENT DECLINED
IN A TYPICAL WEEK, HOW MANY TIMES DO YOU TALK ON THE PHONE WITH FAMILY, FRIENDS, OR NEIGHBORS?: MORE THAN THREE TIMES A WEEK
HOW HARD IS IT FOR YOU TO PAY FOR THE VERY BASICS LIKE FOOD, HOUSING, MEDICAL CARE, AND HEATING?: NOT VERY HARD
IN A TYPICAL WEEK, HOW MANY TIMES DO YOU TALK ON THE PHONE WITH FAMILY, FRIENDS, OR NEIGHBORS?: MORE THAN THREE TIMES A WEEK
HOW OFTEN DO YOU ATTENT MEETINGS OF THE CLUB OR ORGANIZATION YOU BELONG TO?: PATIENT DECLINED

## 2021-11-09 ASSESSMENT — LIFESTYLE VARIABLES
HOW OFTEN DO YOU HAVE SIX OR MORE DRINKS ON ONE OCCASION: LESS THAN MONTHLY
HOW OFTEN DO YOU HAVE A DRINK CONTAINING ALCOHOL: 2-4 TIMES A MONTH
HOW MANY STANDARD DRINKS CONTAINING ALCOHOL DO YOU HAVE ON A TYPICAL DAY: 1 OR 2

## 2021-11-09 ASSESSMENT — PATIENT HEALTH QUESTIONNAIRE - PHQ9
CLINICAL INTERPRETATION OF PHQ2 SCORE: 0
SUM OF ALL RESPONSES TO PHQ9 QUESTIONS 1 AND 2: 0
2. FEELING DOWN, DEPRESSED, IRRITABLE, OR HOPELESS: NOT AT ALL
1. LITTLE INTEREST OR PLEASURE IN DOING THINGS: NOT AT ALL

## 2021-11-09 ASSESSMENT — FIBROSIS 4 INDEX: FIB4 SCORE: 0.34

## 2021-11-09 NOTE — ASSESSMENT & PLAN NOTE
Acute.  Since her baby was born she has been noticing some hair loss particularly in the frontal parietal regions bilaterally.  She reports that it went completely bald but the hair is starting to grow back.  I offered a dermatology referral but she declines inspiratory back.  I suspect it is just the typical hair loss following pregnancy.

## 2021-11-09 NOTE — ASSESSMENT & PLAN NOTE
Chronic, stable.  She follows neurology regularly and is no longer on medication.  She is not had any seizure since stopping medication.  She will continue to follow with neurology as directed.

## 2021-11-09 NOTE — ASSESSMENT & PLAN NOTE
Chronic.  During review of systems she mentions some chest pain/pressure with a sensation that she needs to pop her sternum on the left side.  The pain only occurs if she is not able to pop the area.  She is tender to palpation in the intercostal spaces I suspect this is likely costochondritis.  A handout was provided today.

## 2021-11-09 NOTE — ASSESSMENT & PLAN NOTE
Chronic, stable.  Since her COVID-19 infection in July, 2021 she has been getting residual shortness of breath.  She is also noticing some brain fog.  The brain fog initially improved but then it returned.  She is caring for her 3-month-old baby not getting as much sleep as I deal which could be contributing.  We will continue to monitor.

## 2021-11-09 NOTE — ASSESSMENT & PLAN NOTE
Chronic, stable.  We had a very long discussion today about healthy habits including dietary recommendations, portion size, healthy plate, and exercise recommendations.

## 2021-11-09 NOTE — PATIENT INSTRUCTIONS
Exercise  - 150 minutes of moderate aerobic activity per week OR  - 75 minutes of vigorous activity per week  - Fat burning zone - be able to talk but not sing while exercising  - Est max HR = 188 bpm  - Fat burning HR zone = 112-131    Diet  - pay attention to portion size  - Google healthy plate  - Calories in vs Calories out    Mediterranean Diet  A Mediterranean diet refers to food and lifestyle choices that are based on the traditions of countries located on the [place]. This way of eating has been shown to help prevent certain conditions and improve outcomes for people who have chronic diseases, like kidney disease and heart disease.  What are tips for following this plan?  Lifestyle  · Cook and eat meals together with your family, when possible.  · Drink enough fluid to keep your urine clear or pale yellow.  · Be physically active every day. This includes:  ? Aerobic exercise like running or swimming.  ? Leisure activities like gardening, walking, or housework.  · Get 7-8 hours of sleep each night.  · If recommended by your health care provider, drink red wine in moderation. This means 1 glass a day for nonpregnant women and 2 glasses a day for men. A glass of wine equals 5 oz (150 mL).  Reading food labels    · Check the serving size of packaged foods. For foods such as rice and pasta, the serving size refers to the amount of cooked product, not dry.  · Check the total fat in packaged foods. Avoid foods that have saturated fat or trans fats.  · Check the ingredients list for added sugars, such as corn syrup.  Shopping  · At the grocery store, buy most of your food from the areas near the walls of the store. This includes:  ? Fresh fruits and vegetables (produce).  ? Grains, beans, nuts, and seeds. Some of these may be available in unpackaged forms or large amounts (in bulk).  ? Fresh seafood.  ? Poultry and eggs.  ? Low-fat dairy products.  · Buy whole ingredients instead of prepackaged  foods.  · Buy fresh fruits and vegetables in-season from local [employer] markets.  · Buy frozen fruits and vegetables in resealable bags.  · If you do not have access to quality fresh seafood, buy precooked frozen shrimp or canned fish, such as tuna, salmon, or sardines.  · Buy small amounts of raw or cooked vegetables, salads, or olives from the deli or salad bar at your store.  · Stock your pantry so you always have certain foods on hand, such as olive oil, canned tuna, canned tomatoes, rice, pasta, and beans.  Cooking  · Cook foods with extra-virgin olive oil instead of using butter or other vegetable oils.  · Have meat as a side dish, and have vegetables or grains as your main dish. This means having meat in small portions or adding small amounts of meat to foods like pasta or stew.  · Use beans or vegetables instead of meat in common dishes like chili or lasagna.  · [place] with different cooking methods. Try roasting or broiling vegetables instead of steaming or sautéeing them.  · Add frozen vegetables to soups, stews, pasta, or rice.  · Add nuts or seeds for added healthy fat at each meal. You can add these to yogurt, salads, or vegetable dishes.  · Marinate fish or vegetables using olive oil, lemon juice, garlic, and fresh herbs.  Meal planning    · Plan to eat 1 vegetarian meal one day each week. Try to work up to 2 vegetarian meals, if possible.  · Eat seafood 2 or more times a week.  · Have healthy snacks readily available, such as:  ? Vegetable sticks with hummus.  ? Greek yogurt.  ? Fruit and nut trail mix.  · Eat balanced meals throughout the week. This includes:  ? Fruit: 2-3 servings a day  ? Vegetables: 4-5 servings a day  ? Low-fat dairy: 2 servings a day  ? Fish, poultry, or lean meat: 1 serving a day  ? Beans and legumes: 2 or more servings a week  ? Nuts and seeds: 1-2 servings a day  ? Whole grains: 6-8 servings a day  ? Extra-virgin olive oil: 3-4 servings a day  · Limit red meat and sweets  to only a few servings a month  What are my food choices?  · Mediterranean diet  ? Recommended  § Grains: Whole-grain pasta. Brown rice. Bulgar wheat. Polenta. Couscous. Whole-wheat bread. Oatmeal. Quinoa.  § Vegetables: Artichokes. Beets. Broccoli. Cabbage. Carrots. Eggplant. Green beans. Chard. Kale. Spinach. Onions. Leeks. Peas. Squash. Tomatoes. Peppers. Radishes.  § Fruits: Apples. Apricots. Avocado. Berries. Bananas. Cherries. Dates. Figs. Grapes. Toney. Melon. Oranges. Peaches. Plums. Pomegranate.  § Meats and other protein foods: Beans. Almonds. Sunflower seeds. Pine nuts. Peanuts. Cod. [place]. Scallops. Shrimp. Tuna. Tilapia. Clams. Oysters. Eggs.  § Dairy: Low-fat milk. Cheese. Greek yogurt.  § Beverages: Water. Red wine. Herbal tea.  § Fats and oils: Extra virgin olive oil. Avocado oil. Grape seed oil.  § Sweets and desserts: Greek yogurt with honey. Baked apples. Poached pears. Trail mix.  § Seasoning and other foods: Basil. Cilantro. Coriander. Cumin. Mint. Parsley. Dorian. Rosemary. Tarragon. Garlic. Oregano. Thyme. Pepper. Balsalmic vinegar. Tahini. Hummus. Tomato sauce. Olives. Mushrooms.  ? Limit these  § Grains: Prepackaged pasta or rice dishes. Prepackaged cereal with added sugar.  § Vegetables: Deep fried potatoes (french fries).  § Fruits: Fruit canned in syrup.  § Meats and other protein foods: Beef. Pork. Lamb. Poultry with skin. Hot dogs. Carter.  § Dairy: Ice cream. Sour cream. Whole milk.  § Beverages: Juice. Sugar-sweetened soft drinks. Beer. Liquor and spirits.  § Fats and oils: Butter. Canola oil. Vegetable oil. Beef fat (tallow). Lard.  § Sweets and desserts: Cookies. Cakes. Pies. Candy.  § Seasoning and other foods: Mayonnaise. Premade sauces and marinades.  The items listed may not be a complete list. Talk with your dietitian about what dietary choices are right for you.  Summary  · The Mediterranean diet includes both food and lifestyle choices.  · Eat a variety of fresh fruits and  vegetables, beans, nuts, seeds, and whole grains.  · Limit the amount of red meat and sweets that you eat.  · Talk with your health care provider about whether it is safe for you to drink red wine in moderation. This means 1 glass a day for nonpregnant women and 2 glasses a day for men. A glass of wine equals 5 oz (150 mL).  This information is not intended to replace advice given to you by your health care provider. Make sure you discuss any questions you have with your health care provider.  Document Released: 08/10/2017 Document Revised: 08/17/2017 Document Reviewed: 08/10/2017  [employer] Patient Education © 2020 [employer]      Costochondritis  Your exam shows you have symptoms and findings of costochondritis. This condition causes pain and tenderness over the rib cartilages. It is a common cause of chest pain in both children and adults. It affects women more frequently than men. The symptoms are from irritation (inflammation) of the rib cartilages. The cause is unknown. Chest pain from costochondritis is often made worse by deep breathing, coughing, and movement. Usually there is a tender area over the front of the chest.  Chest pain may also be from more serious conditions such as heart disease, infections, blood clots, pleurisy, and minor injuries. Depending on the nature of your pain, your age, and other risk factors, additional medical evaluation may be needed to find the cause of your pain. Costochondritis is a self-limited condition. This means it will improve on its own without specific treatment. You should rest for the next 2 to 3 days. You can then resume full activity as the pain improves.   Anti-inflammatory drugs or pain medicine may be needed to provide relief. See your caregiver if your pain lasts longer than 1 to 2 weeks.   SEEK IMMEDIATE MEDICAL CARE IF:  · You develop increased pain or pain that radiates into the back, arms, neck, or jaw.   · You develop shortness of breath, productive  cough, or you are coughing up blood.   · You develop a fever, chills, general weakness, vomiting, or fainting.   Document Released: 12/18/2006 Document Revised: 03/11/2013 Document Reviewed: 06/11/2008  [employer]® Patient Information ©2013 [employer].

## 2021-11-09 NOTE — PROGRESS NOTES
Subjective:     CC:  Diagnoses of Superficial venous thrombosis of arm, left, Localization-related partial epilepsy with complex partial seizures (HCC), History of COVID-19, Hair loss, Obesity (BMI 30-39.9), and Costochondritis were pertinent to this visit.    HISTORY OF THE PRESENT ILLNESS: Patient is a 31 y.o. female. This pleasant patient is here today to establish care and discuss multiple concerns. Her prior PCP was Mic Baldwin with [facility].    Problem   History of Covid-19    7/2021    She is noticing some brain fog since her COVID infection. It is the same as she felt while sick with COVID. Initially got better but returned. Not much sleep, currently has a 3 month old at home. Last 5 days, minimal sleep. Usually 4-5 hours consecutively of sleep, feeds daughter, then gets another 3 hours.     She also notes continued trouble breathing which is unchanged since the infection.     Hair Loss    Since had her baby 3 months ago. Noticed some balding over the forehead/temples. Went completely bald in that area but hair is growing back.      Costochondritis   Superficial Venous Thrombosis of Arm, Left    Acute. In 7/2021. Working with vascular medicine. Reports resolving.      Major depressive disorder with single episode, in full remission (HCC)   Localization-related partial epilepsy with complex partial seizures (HCC)    Chronic. Not on any medication, none since 2018. No seizures since 2018. She does follow with neurology     Obesity (Bmi 30-39.9)    This is a chronic condition.  Max weight: 193 lbs (11/2021)  Current weight: 193 lbs  Weight change: +7 lbs since 7/2021  BMI: 33.27  Diet: trying to eat healthy  Exercise: working remotely, so less activity        Shortness of Breath With Exposure to Covid-19 Virus (Resolved)   Dizziness (Resolved)   Uses birth control-OCPs (Resolved)   Syncope and Collapse (Resolved)    Normal echo with normal bubble study 02/2011 done by [facility] at   [facility] (report in media)  EEG done 11/2013 at [facility] \"mildly abnormal with a superimposed beta rhythn, which may be secondary to side effects of benzos, otherwise no clear epileptogenic or seizure activity.\"  (report in media).  Dr. Gray          Health Maintenance: Completed    ROS:   Review of Systems   Constitutional: Negative for chills and fever.   HENT: Negative for sore throat.    Eyes: Negative for blurred vision.   Respiratory: Positive for shortness of breath.    Cardiovascular: Positive for chest pain.        Pressure, has to pop chest   Gastrointestinal: Negative for nausea and vomiting.   Genitourinary: Negative for dysuria.   Musculoskeletal: Negative for myalgias.   Skin: Negative for rash.   Neurological: Negative for dizziness.         Objective:     Exam: /88 (BP Location: Left arm, Patient Position: Sitting, BP Cuff Size: Adult)   Pulse 86   Temp 36.6 °C (97.8 °F) (Temporal)   Resp 16   Ht 1.626 m (5' 4\")   Wt 87.9 kg (193 lb 12.8 oz)   SpO2 97%  Body mass index is 33.27 kg/m².    Physical Exam  Constitutional:       Appearance: Normal appearance.   HENT:      Head: Normocephalic and atraumatic.      Right Ear: Tympanic membrane and ear canal normal.      Left Ear: Tympanic membrane and ear canal normal.      Mouth/Throat:      Mouth: Mucous membranes are moist.      Pharynx: Oropharynx is clear.   Eyes:      Extraocular Movements: Extraocular movements intact.      Pupils: Pupils are equal, round, and reactive to light.   Neck:      Thyroid: No thyromegaly.   Cardiovascular:      Rate and Rhythm: Normal rate and regular rhythm.      Heart sounds: Normal heart sounds.   Pulmonary:      Effort: Pulmonary effort is normal.      Breath sounds: Normal breath sounds.   Chest:       Abdominal:      General: Abdomen is flat. Bowel sounds are normal.      Palpations: Abdomen is soft. There is no mass.      Tenderness: There is no abdominal tenderness. There is no guarding. "   Musculoskeletal:      Cervical back: Normal range of motion and neck supple.      Right lower leg: No edema.      Left lower leg: No edema.   Lymphadenopathy:      Cervical: No cervical adenopathy.   Skin:     General: Skin is warm and dry.   Neurological:      General: No focal deficit present.      Mental Status: She is alert.       Assessment & Plan:   31 y.o. female with the following -    Problem List Items Addressed This Visit     Obesity (BMI 30-39.9)     Chronic, stable.  We had a very long discussion today about healthy habits including dietary recommendations, portion size, healthy plate, and exercise recommendations.         Localization-related partial epilepsy with complex partial seizures (HCC)     Chronic, stable.  She follows neurology regularly and is no longer on medication.  She is not had any seizure since stopping medication.  She will continue to follow with neurology as directed.         Superficial venous thrombosis of arm, left     Acute.  She developed a superficial venous thrombosis of her left arm in July, 2021.  She has been working with vascular medicine and reports that it is improving.  She will follow with vascular medicine as directed.         History of COVID-19     Chronic, stable.  Since her COVID-19 infection in July, 2021 she has been getting residual shortness of breath.  She is also noticing some brain fog.  The brain fog initially improved but then it returned.  She is caring for her 3-month-old baby not getting as much sleep as I deal which could be contributing.  We will continue to monitor.         Hair loss     Acute.  Since her baby was born she has been noticing some hair loss particularly in the frontal parietal regions bilaterally.  She reports that it went completely bald but the hair is starting to grow back.  I offered a dermatology referral but she declines inspiratory back.  I suspect it is just the typical hair loss following pregnancy.          Costochondritis     Chronic.  During review of systems she mentions some chest pain/pressure with a sensation that she needs to pop her sternum on the left side.  The pain only occurs if she is not able to pop the area.  She is tender to palpation in the intercostal spaces I suspect this is likely costochondritis.  A handout was provided today.               I spent a total of 35 minutes with record review, exam, communication with the patient, and documentation of this encounter.    Return in about 3 months (around 2/9/2022) for f/u obesity.    Please note that this dictation was created using voice recognition software. I have made every reasonable attempt to correct obvious errors, but I expect that there are errors of grammar and possibly content that I did not discover before finalizing the note.

## 2021-11-09 NOTE — LETTER
[facility]  Tamara Coffman M.D.  [address]  [place] [address]  Fax: [phone]   Authorization for Release/Disclosure of   Protected Health Information   Name: MERLY LEAL : 1990 SSN: [id number]   Address: [address] [place] [address] [place] [address] Phone:    [phone] (home)    I authorize the entity listed below to release/disclose the PHI below to:   [facility]/Tamara Coffman M.D. and Tamara Coffman M.D.   Provider or Entity Name:     Address   City, State, Zip   Phone:      Fax:     Reason for request: continuity of care   Information to be released:    [  ] LAST COLONOSCOPY,  including any PATH REPORT and follow-up  [  ] LAST FIT/COLOGUARD RESULT [  ] LAST DEXA  [  ] LAST MAMMOGRAM  [  ] LAST PAP  [  ] LAST LABS [  ] RETINA EXAM REPORT  [  ] IMMUNIZATION RECORDS  [  ] Release all info      [  ] Check here and initial the line next to each item to release ALL health information INCLUDING  _____ Care and treatment for drug and / or alcohol abuse  _____ HIV testing, infection status, or AIDS  _____ Genetic Testing    DATES OF SERVICE OR TIME PERIOD TO BE DISCLOSED: _____________  I understand and acknowledge that:  * This Authorization may be revoked at any time by you in writing, except if your health information has already been used or disclosed.  * Your health information that will be used or disclosed as a result of you signing this authorization could be re-disclosed by the recipient. If this occurs, your re-disclosed health information may no longer be protected by State or Federal laws.  * You may refuse to sign this Authorization. Your refusal will not affect your ability to obtain treatment.  * This Authorization becomes effective upon signing and will  on (date) __________.      If no date is indicated, this Authorization will  one (1) year from the signature date.    Name: Merly Leal    Signature:   Date:     2021       PLEASE FAX  REQUESTED RECORDS BACK TO: [phone]

## 2022-02-15 ENCOUNTER — HOSPITAL ENCOUNTER (EMERGENCY)
Facility: MEDICAL CENTER | Age: 32
End: 2022-02-15
Attending: EMERGENCY MEDICINE
Payer: COMMERCIAL

## 2022-02-15 VITALS
RESPIRATION RATE: 16 BRPM | WEIGHT: 188.49 LBS | HEART RATE: 88 BPM | SYSTOLIC BLOOD PRESSURE: 120 MMHG | DIASTOLIC BLOOD PRESSURE: 77 MMHG | BODY MASS INDEX: 32.18 KG/M2 | OXYGEN SATURATION: 94 % | HEIGHT: 64 IN | TEMPERATURE: 97.4 F

## 2022-02-15 DIAGNOSIS — R25.1 TREMOR: ICD-10-CM

## 2022-02-15 DIAGNOSIS — F43.0 STRESS REACTION: ICD-10-CM

## 2022-02-15 LAB
ALBUMIN SERPL BCP-MCNC: 4.8 G/DL (ref 3.2–4.9)
ALBUMIN/GLOB SERPL: 1.8 G/DL
ALP SERPL-CCNC: 76 U/L (ref 30–99)
ALT SERPL-CCNC: 22 U/L (ref 2–50)
AMPHET UR QL SCN: NEGATIVE
ANION GAP SERPL CALC-SCNC: 13 MMOL/L (ref 7–16)
APPEARANCE UR: CLEAR
AST SERPL-CCNC: 19 U/L (ref 12–45)
BACTERIA #/AREA URNS HPF: NEGATIVE /HPF
BARBITURATES UR QL SCN: NEGATIVE
BASOPHILS # BLD AUTO: 0.3 % (ref 0–1.8)
BASOPHILS # BLD: 0.04 K/UL (ref 0–0.12)
BENZODIAZ UR QL SCN: NEGATIVE
BILIRUB SERPL-MCNC: 0.3 MG/DL (ref 0.1–1.5)
BILIRUB UR QL STRIP.AUTO: NEGATIVE
BUN SERPL-MCNC: 18 MG/DL (ref 8–22)
BZE UR QL SCN: NEGATIVE
CALCIUM SERPL-MCNC: 9.3 MG/DL (ref 8.5–10.5)
CANNABINOIDS UR QL SCN: POSITIVE
CHLORIDE SERPL-SCNC: 108 MMOL/L (ref 96–112)
CO2 SERPL-SCNC: 19 MMOL/L (ref 20–33)
COLOR UR: YELLOW
CREAT SERPL-MCNC: 0.72 MG/DL (ref 0.5–1.4)
EOSINOPHIL # BLD AUTO: 0.02 K/UL (ref 0–0.51)
EOSINOPHIL NFR BLD: 0.2 % (ref 0–6.9)
EPI CELLS #/AREA URNS HPF: ABNORMAL /HPF
ERYTHROCYTE [DISTWIDTH] IN BLOOD BY AUTOMATED COUNT: 41.9 FL (ref 35.9–50)
GLOBULIN SER CALC-MCNC: 2.6 G/DL (ref 1.9–3.5)
GLUCOSE SERPL-MCNC: 98 MG/DL (ref 65–99)
GLUCOSE UR STRIP.AUTO-MCNC: NEGATIVE MG/DL
HCG SERPL QL: NEGATIVE
HCT VFR BLD AUTO: 43.9 % (ref 37–47)
HGB BLD-MCNC: 15 G/DL (ref 12–16)
HYALINE CASTS #/AREA URNS LPF: ABNORMAL /LPF
IMM GRANULOCYTES # BLD AUTO: 0.05 K/UL (ref 0–0.11)
IMM GRANULOCYTES NFR BLD AUTO: 0.4 % (ref 0–0.9)
KETONES UR STRIP.AUTO-MCNC: ABNORMAL MG/DL
LEUKOCYTE ESTERASE UR QL STRIP.AUTO: ABNORMAL
LYMPHOCYTES # BLD AUTO: 1.18 K/UL (ref 1–4.8)
LYMPHOCYTES NFR BLD: 10 % (ref 22–41)
MCH RBC QN AUTO: 29.7 PG (ref 27–33)
MCHC RBC AUTO-ENTMCNC: 34.2 G/DL (ref 33.6–35)
MCV RBC AUTO: 86.9 FL (ref 81.4–97.8)
METHADONE UR QL SCN: NEGATIVE
MICRO URNS: ABNORMAL
MONOCYTES # BLD AUTO: 0.57 K/UL (ref 0–0.85)
MONOCYTES NFR BLD AUTO: 4.8 % (ref 0–13.4)
NEUTROPHILS # BLD AUTO: 9.98 K/UL (ref 2–7.15)
NEUTROPHILS NFR BLD: 84.3 % (ref 44–72)
NITRITE UR QL STRIP.AUTO: NEGATIVE
NRBC # BLD AUTO: 0 K/UL
NRBC BLD-RTO: 0 /100 WBC
OPIATES UR QL SCN: NEGATIVE
OXYCODONE UR QL SCN: NEGATIVE
PCP UR QL SCN: NEGATIVE
PH UR STRIP.AUTO: 5.5 [PH] (ref 5–8)
PLATELET # BLD AUTO: 328 K/UL (ref 164–446)
PMV BLD AUTO: 9.6 FL (ref 9–12.9)
POTASSIUM SERPL-SCNC: 4.3 MMOL/L (ref 3.6–5.5)
PROPOXYPH UR QL SCN: NEGATIVE
PROT SERPL-MCNC: 7.4 G/DL (ref 6–8.2)
PROT UR QL STRIP: NEGATIVE MG/DL
RBC # BLD AUTO: 5.05 M/UL (ref 4.2–5.4)
RBC # URNS HPF: ABNORMAL /HPF
RBC UR QL AUTO: NEGATIVE
SODIUM SERPL-SCNC: 140 MMOL/L (ref 135–145)
SP GR UR STRIP.AUTO: 1.02
UROBILINOGEN UR STRIP.AUTO-MCNC: 0.2 MG/DL
WBC # BLD AUTO: 11.8 K/UL (ref 4.8–10.8)
WBC #/AREA URNS HPF: ABNORMAL /HPF

## 2022-02-15 PROCEDURE — 81001 URINALYSIS AUTO W/SCOPE: CPT

## 2022-02-15 PROCEDURE — 84703 CHORIONIC GONADOTROPIN ASSAY: CPT

## 2022-02-15 PROCEDURE — 85025 COMPLETE CBC W/AUTO DIFF WBC: CPT

## 2022-02-15 PROCEDURE — 96374 THER/PROPH/DIAG INJ IV PUSH: CPT

## 2022-02-15 PROCEDURE — 99284 EMERGENCY DEPT VISIT MOD MDM: CPT

## 2022-02-15 PROCEDURE — 700105 HCHG RX REV CODE 258: Performed by: EMERGENCY MEDICINE

## 2022-02-15 PROCEDURE — 80053 COMPREHEN METABOLIC PANEL: CPT

## 2022-02-15 PROCEDURE — 36415 COLL VENOUS BLD VENIPUNCTURE: CPT

## 2022-02-15 PROCEDURE — 700111 HCHG RX REV CODE 636 W/ 250 OVERRIDE (IP): Performed by: EMERGENCY MEDICINE

## 2022-02-15 PROCEDURE — 80307 DRUG TEST PRSMV CHEM ANLYZR: CPT

## 2022-02-15 RX ORDER — LORAZEPAM 2 MG/ML
1 INJECTION INTRAMUSCULAR ONCE
Status: COMPLETED | OUTPATIENT
Start: 2022-02-15 | End: 2022-02-15

## 2022-02-15 RX ORDER — IBUPROFEN 200 MG
400 TABLET ORAL EVERY 6 HOURS PRN
Status: SHIPPED | COMMUNITY
End: 2022-02-16

## 2022-02-15 RX ORDER — SODIUM CHLORIDE 9 MG/ML
1000 INJECTION, SOLUTION INTRAVENOUS ONCE
Status: COMPLETED | OUTPATIENT
Start: 2022-02-15 | End: 2022-02-15

## 2022-02-15 RX ADMIN — SODIUM CHLORIDE 1000 ML: 9 INJECTION, SOLUTION INTRAVENOUS at 13:21

## 2022-02-15 RX ADMIN — LORAZEPAM 1 MG: 2 INJECTION INTRAMUSCULAR; INTRAVENOUS at 13:21

## 2022-02-15 ASSESSMENT — FIBROSIS 4 INDEX: FIB4 SCORE: 0.34

## 2022-02-15 NOTE — ED TRIAGE NOTES
[facility] with   Chief Complaint   Patient presents with   • Tremors     Bilateral arms   • Near Syncopal   Was at work when she felt near-syncopal and had sudden onset of severe tremors. Awake and oriented x 4. Hx of Epilepsy. Not medicated for seizure due to pregnancy and breastfeeding. C/o mild muscle cramps. Spouse at side in triage. Pt is tearful.

## 2022-02-15 NOTE — ED PROVIDER NOTES
ED Provider Note    Scribed for Calixto Rodriguez M.D. by Neyda Sarabia. 2/15/2022, 1:01 PM.    Primary care provider: Tamara Coffman M.D.  Means of arrival: EMS  History obtained from: Patient  History limited by: None    CHIEF COMPLAINT  Chief Complaint   Patient presents with    Tremors     Bilateral arms    Near Syncopal       HPI  Natasha Pino is a 31 y.o. female who presents to the Emergency Department for evaluation of bilateral arm tremors onset this morning. The patient has a history of epilepsy with complex partial seizures, but has not had an episode since 2018. The patient was eating breakfast when she states she felt like she was about to have an anxiety attack and felt near syncopal and lightheaded. She has had three near syncopal episodes since. She states that she tried to walk, which usually helps, but had no alleviation. She sat back down and the tremors began. She has not been able to stop since. She drank coffee with CBD oil this morning for the first time. She has had mild right sided chest pain. Patient states that she has been experiencing some stress recently. She denies associated fever, chills, cough, or sore throat.    REVIEW OF SYSTEMS  Pertinent positives include bilateral arm tremors and chest pain. Pertinent negatives include no fever, chills, cough, or sore throat. All other systems negative.    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    SURGICAL HISTORY   has a past surgical history that includes leep (2011) and appendectomy.    SOCIAL HISTORY  Social History     Tobacco Use    Smoking status: Never Smoker    Smokeless tobacco: Never Used   Vaping Use    Vaping Use: Never used   Substance Use Topics    Alcohol use: Not Currently     Alcohol/week: 0.0 oz     Comment:  "Socially    Drug use: Not Currently      Social History     Substance and Sexual Activity   Drug Use Not Currently       FAMILY HISTORY  Family History   Problem Relation Age of Onset    Cancer Mother         ovarian CA  and cervical CA (in her mid 30s)    Hyperlipidemia Father     Heart Disease Father         MI x2    Seizures Sister     GI Disease Sister         \"intestinal problem\"    Diabetes Maternal Grandmother     Cancer Maternal Grandmother         breast       CURRENT MEDICATIONS  Home Medications       Reviewed by Kj Combs (Pharmacy Tech) on 02/15/22 at 1338  Med List Status: Complete     Medication Last Dose Status   ibuprofen (ADVIL) 200 MG Tab 2/14/2022 Active   Prenatal MV & Min w/FA-DHA (PRENATAL GUMMIES PO) 2/14/2022 Active                    ALLERGIES  Allergies   Allergen Reactions    Keppra [Levetiracetam] Unspecified     Severe Depression     Mushroom Extract Complex Anaphylaxis     Pt reports her throat swells       PHYSICAL EXAM  VITAL SIGNS: /72   Pulse (!) 126   Temp 36.8 °C (98.2 °F) (Temporal)   Resp 20   Ht 1.626 m (5' 4\")   Wt 85.5 kg (188 lb 7.9 oz)   LMP 02/15/2020   SpO2 97%   BMI 32.35 kg/m²     Constitutional: Well developed, Well nourished, mild distress.   HENT: Normocephalic, Atraumatic, mask in place.  Eyes: Conjunctiva normal, No discharge. Pupils are 3 and reactive.   Cardiovascular: Slightly tachycardic, Normal rhythm, No murmurs, equal pulses.   Pulmonary: Normal breath sounds, No respiratory distress, No wheezing, No rales, No rhonchi.  Chest: No chest wall tenderness or deformity.   Abdomen:Soft, No tenderness, No masses, no rebound, no guarding.   Back: No CVA tenderness.   Musculoskeletal: No major deformities noted, No tenderness.   Skin: Warm, Dry, No erythema, No rash.   Neurologic: Alert & oriented x 3, Normal finger to nose, Normal heel to shin,  Equal strength in upper and lower extremities bilaterally, No facial droop, Non rhythmic jerking " of arms that seem to diminish with distractions.   Psychiatric: Affect normal, Judgment normal, Mood normal.     LABS  Results for orders placed or performed during the hospital encounter of 02/15/22   HCG QUAL SERUM   Result Value Ref Range    Beta-Hcg Qualitative Serum Negative Negative   CBC WITH DIFFERENTIAL   Result Value Ref Range    WBC 11.8 (H) 4.8 - 10.8 K/uL    RBC 5.05 4.20 - 5.40 M/uL    Hemoglobin 15.0 12.0 - 16.0 g/dL    Hematocrit 43.9 37.0 - 47.0 %    MCV 86.9 81.4 - 97.8 fL    MCH 29.7 27.0 - 33.0 pg    MCHC 34.2 33.6 - 35.0 g/dL    RDW 41.9 35.9 - 50.0 fL    Platelet Count 328 164 - 446 K/uL    MPV 9.6 9.0 - 12.9 fL    Neutrophils-Polys 84.30 (H) 44.00 - 72.00 %    Lymphocytes 10.00 (L) 22.00 - 41.00 %    Monocytes 4.80 0.00 - 13.40 %    Eosinophils 0.20 0.00 - 6.90 %    Basophils 0.30 0.00 - 1.80 %    Immature Granulocytes 0.40 0.00 - 0.90 %    Nucleated RBC 0.00 /100 WBC    Neutrophils (Absolute) 9.98 (H) 2.00 - 7.15 K/uL    Lymphs (Absolute) 1.18 1.00 - 4.80 K/uL    Monos (Absolute) 0.57 0.00 - 0.85 K/uL    Eos (Absolute) 0.02 0.00 - 0.51 K/uL    Baso (Absolute) 0.04 0.00 - 0.12 K/uL    Immature Granulocytes (abs) 0.05 0.00 - 0.11 K/uL    NRBC (Absolute) 0.00 K/uL   COMP METABOLIC PANEL   Result Value Ref Range    Sodium 140 135 - 145 mmol/L    Potassium 4.3 3.6 - 5.5 mmol/L    Chloride 108 96 - 112 mmol/L    Co2 19 (L) 20 - 33 mmol/L    Anion Gap 13.0 7.0 - 16.0    Glucose 98 65 - 99 mg/dL    Bun 18 8 - 22 mg/dL    Creatinine 0.72 0.50 - 1.40 mg/dL    Calcium 9.3 8.5 - 10.5 mg/dL    AST(SGOT) 19 12 - 45 U/L    ALT(SGPT) 22 2 - 50 U/L    Alkaline Phosphatase 76 30 - 99 U/L    Total Bilirubin 0.3 0.1 - 1.5 mg/dL    Albumin 4.8 3.2 - 4.9 g/dL    Total Protein 7.4 6.0 - 8.2 g/dL    Globulin 2.6 1.9 - 3.5 g/dL    A-G Ratio 1.8 g/dL   URINALYSIS (UA)    Specimen: Urine   Result Value Ref Range    Color Yellow     Character Clear     Specific Gravity 1.022 <1.035    Ph 5.5 5.0 - 8.0    Glucose  Negative Negative mg/dL    Ketones Trace (A) Negative mg/dL    Protein Negative Negative mg/dL    Bilirubin Negative Negative    Urobilinogen, Urine 0.2 Negative    Nitrite Negative Negative    Leukocyte Esterase Small (A) Negative    Occult Blood Negative Negative    Micro Urine Req Microscopic    ESTIMATED GFR   Result Value Ref Range    GFR If African American >60 >60 mL/min/1.73 m 2    GFR If Non African American >60 >60 mL/min/1.73 m 2   URINE MICROSCOPIC (W/UA)   Result Value Ref Range    WBC 5-10 (A) /hpf    RBC 0-2 /hpf    Bacteria Negative None /hpf    Epithelial Cells Moderate (A) /hpf    Hyaline Cast 3-5 (A) /lpf      All labs reviewed by me.    COURSE & MEDICAL DECISION MAKING  Pertinent Labs & Imaging studies reviewed. (See chart for details)    Review of the patient's past medical records show that the patient has a history of seizures but has not had an episode since 2018.     1:01 PM - Patient seen and examined at bedside. Discussed plan of care with patient, including labs. Patient verbalizes understanding and support with the plan of care. Patient will be treated with Ativan 1 mg. The patient will receive IV fluids for tachycardia. Ordered UA, CMP, CBC w/ Diff, HCG Qual, and Urine Drug Screen to evaluate her symptoms. The differential diagnoses include but are not limited to: seizure disorder, electrolyte abnormality, stress reaction, or medication reaction.      2:03 PM - Patient was reevaluated at bedside. She has no more tremors and is resting calmly.     4:00 PM - Patient was reevaluated at bedside. Discussed lab and radiology results with the patient. Patient had the opportunity to ask any questions. The plan for discharge was discussed with them and she was told to return for any new or worsening symptoms. She was also informed of the plans for follow up. Patient is understanding and agreeable to the plan for discharge.      There was a well improved response to IV fluids after patient  reevaluation.    Medical Decision Making: After Ativan the patient's tremors have completely stopped I think this is more a stress reaction or pseudoseizure.  The shaking seem to go away when the patient was distracted.  Her electrolytes are otherwise unremarkable I do not find any signs of an infectious process at therefore think she can be discharged to follow-up with her primary as well as her neurologist.    The patient will return for new or worsening symptoms and is stable at the time of discharge.    The patient is referred to a primary physician for blood pressure management, diabetic screening, and for all other preventative health concerns.    DISPOSITION:  Patient will be discharged home in stable condition.    FOLLOW UP:  Tamara Coffman M.D.  [address]  [place] [address]  [phone]    Schedule an appointment as soon as possible for a visit in 2 days      Your neurologist    Schedule an appointment as soon as possible for a visit in 1 week      FINAL IMPRESSION  1. Tremor    2. Stress reaction          Neyda BELTRAN (Jessica), am scribing for, and in the presence of, Calixto Rodriguez M.D.    Electronically signed by: Neyda Sarabia (Jessica), 2/15/2022    ICalixto M.D. personally performed the services described in this documentation, as scribed by Neyda Sarabia in my presence, and it is both accurate and complete. C.    The note accurately reflects work and decisions made by me.  Calixto Rodriguez M.D.  2/15/2022  8:14 PM

## 2022-02-15 NOTE — ED NOTES
Med rec completed per patient with S/O at bedside.  Allergies reviewed with patient.  Patient denies taking any prescription medications.  No outpatient antibiotics in the last 30 days.  Patient's preferred pharmacy: [facility] on [address].

## 2022-02-16 ENCOUNTER — OFFICE VISIT (OUTPATIENT)
Dept: MEDICAL GROUP | Facility: PHYSICIAN GROUP | Age: 32
End: 2022-02-16
Payer: COMMERCIAL

## 2022-02-16 VITALS
BODY MASS INDEX: 32.74 KG/M2 | OXYGEN SATURATION: 100 % | DIASTOLIC BLOOD PRESSURE: 76 MMHG | SYSTOLIC BLOOD PRESSURE: 112 MMHG | RESPIRATION RATE: 16 BRPM | WEIGHT: 191.8 LBS | HEART RATE: 68 BPM | TEMPERATURE: 98 F | HEIGHT: 64 IN

## 2022-02-16 DIAGNOSIS — G25.2 COARSE TREMORS: ICD-10-CM

## 2022-02-16 PROCEDURE — 99214 OFFICE O/P EST MOD 30 MIN: CPT | Performed by: STUDENT IN AN ORGANIZED HEALTH CARE EDUCATION/TRAINING PROGRAM

## 2022-02-16 RX ORDER — AMOXICILLIN 875 MG/1
TABLET, COATED ORAL
COMMUNITY
Start: 2022-01-27 | End: 2022-02-16

## 2022-02-16 RX ORDER — HYDROXYZINE HYDROCHLORIDE 25 MG/1
25 TABLET, FILM COATED ORAL 3 TIMES DAILY PRN
Qty: 30 TABLET | Refills: 0 | Status: SHIPPED | OUTPATIENT
Start: 2022-02-16 | End: 2022-05-17

## 2022-02-16 ASSESSMENT — FIBROSIS 4 INDEX: FIB4 SCORE: 0.4

## 2022-02-16 NOTE — ED NOTES
Pt discharged home as ordered by erp. Pt instructed to follow up with her PCP and neurology. Pt instructed to return here as needed. Pt verbalized understanding and left ambulating independently with her SO

## 2022-02-16 NOTE — PROGRESS NOTES
"CC:  The encounter diagnosis was Coarse tremors.    HISTORY OF THE PRESENT ILLNESS: Patient is a 31 y.o. female. This pleasant patient is here today for ER follow up. Her PCP is Dr. Tamara Coffman MD.    Patient consumes some coffee with CBD oil for the first time yesterday morning she began to have very coarse tremors of the upper extremities.  She proceeded to ER where they ruled out any immediately concerning pathology.  She was given some Ativan at which point the tremor stopped.  The patient presents today for follow-up.  Her symptoms have not returned and she has refrain from the use of CBD oil.  Of note she does have a seizure history and she is scheduled to see neurology on 2/18/2022.      No problem-specific Assessment & Plan notes found for this encounter.      Current Outpatient Medications Ordered in Epic   Medication Sig Dispense Refill   • hydrOXYzine HCl (ATARAX) 25 MG Tab Take 1 Tablet by mouth 3 times a day as needed for Itching. 30 Tablet 0   • Prenatal MV & Min w/FA-DHA (PRENATAL GUMMIES PO) Take 4 Tablets by mouth every morning.       No current [facility]-ordered facility-administered medications on file.     ROS:   Gen: no fevers/chills, unplanned changes in weight  Neuro: no headaches, no numbness/tingling      Objective:     Exam: /76 (BP Location: Left arm, Patient Position: Sitting, BP Cuff Size: Adult)   Pulse 68   Temp 36.7 °C (98 °F) (Temporal)   Resp 16   Ht 1.626 m (5' 4\")   Wt 87 kg (191 lb 12.8 oz)   SpO2 100%  Body mass index is 32.92 kg/m².    General: Normal appearing. No distress.  HEENT: Normocephalic. Eyes conjunctiva clear lids without ptosis, pupils equal and reactive to light accommodation.  Neck: Supple without JVD.  Pulmonary: Clear to ausculation.  Normal effort. No rales, ronchi, or wheezing.  Cardiovascular: Regular rate and rhythm without murmur.   Neurologic: Grossly nonfocal.  CN II through XII intact.  No dysdiadochokinesia or lack of coordination, negative " pronator drift.  Skin: Warm and dry.  No obvious lesions.  Musculoskeletal: Normal coordinated gait. No extremity cyanosis, clubbing, or edema.  Psych: Normal mood and affect.  Not anxious.  Alert and oriented x3. Judgment and insight is normal.    A chaperone was offered to the patient during today's exam. Patient declined chaperone.    Labs:   2/15/2022:  -CBC, elevated WBCs 11.8  -CMP, WNL    Assessment & Plan:   31 y.o. female with the following -    1.  Tremors  -  Undiagnosed new problem with uncertain prognosis  -  This is very likely secondary to her first-time use of CBD oil.  I have instructed her to refrain from this behavior.  -Continue with referral to neurology.  -Hydroxyzine hydrochloride 25 mg 3 times daily as needed for tremor/anxiety.  Dispense 30.  Refill 0.    Return if symptoms worsen or fail to improve.    Please note that this dictation was created using voice recognition software. I have made every reasonable attempt to correct obvious errors, but I expect that there are errors of grammar and possibly content that I did not discover before finalizing the note.    Bill Hays PA-C 2/16/2022

## 2022-04-03 ENCOUNTER — OFFICE VISIT (OUTPATIENT)
Dept: URGENT CARE | Facility: PHYSICIAN GROUP | Age: 32
End: 2022-04-03
Payer: COMMERCIAL

## 2022-04-03 VITALS
HEART RATE: 86 BPM | BODY MASS INDEX: 32.61 KG/M2 | TEMPERATURE: 98.4 F | OXYGEN SATURATION: 97 % | HEIGHT: 64 IN | WEIGHT: 191 LBS | RESPIRATION RATE: 16 BRPM | DIASTOLIC BLOOD PRESSURE: 74 MMHG | SYSTOLIC BLOOD PRESSURE: 116 MMHG

## 2022-04-03 DIAGNOSIS — H65.03 NON-RECURRENT ACUTE SEROUS OTITIS MEDIA OF BOTH EARS: ICD-10-CM

## 2022-04-03 PROCEDURE — 99213 OFFICE O/P EST LOW 20 MIN: CPT | Performed by: PHYSICIAN ASSISTANT

## 2022-04-03 RX ORDER — AZELASTINE 1 MG/ML
1 SPRAY, METERED NASAL 2 TIMES DAILY
Qty: 30 ML | Refills: 0 | Status: SHIPPED | OUTPATIENT
Start: 2022-04-03 | End: 2022-05-17

## 2022-04-03 RX ORDER — NEOMYCIN SULFATE, POLYMYXIN B SULFATE AND HYDROCORTISONE 10; 3.5; 1 MG/ML; MG/ML; [USP'U]/ML
4 SUSPENSION/ DROPS AURICULAR (OTIC) 4 TIMES DAILY
Qty: 16 ML | Refills: 0 | Status: SHIPPED | OUTPATIENT
Start: 2022-04-03 | End: 2022-04-13

## 2022-04-03 RX ORDER — NORETHINDRONE 0.35 MG/1
1 TABLET ORAL DAILY
COMMUNITY
Start: 2022-03-10 | End: 2022-05-17

## 2022-04-03 ASSESSMENT — ENCOUNTER SYMPTOMS
VOMITING: 0
EYE PAIN: 0
DIARRHEA: 0
FEVER: 0
SHORTNESS OF BREATH: 0
MYALGIAS: 0
HEADACHES: 0
CONSTIPATION: 0
NAUSEA: 0
CHILLS: 0
COUGH: 0
ABDOMINAL PAIN: 0
SORE THROAT: 0

## 2022-04-03 ASSESSMENT — FIBROSIS 4 INDEX: FIB4 SCORE: 0.4

## 2022-04-03 NOTE — PROGRESS NOTES
"Subjective:   Natasha Pino is a 31 y.o. female who presents for Other (Back of right ear is sensitive and is moving down to neck started yesterday morning, left ear started to hurt this morning )      31-year-old female presents with bilateral ear discomfort and fullness with muffled hearing.  Recently treated with Augmentin for a sinus infection, sinuses seem to be questionably improved however the ear started bothering her around 2 days ago.  No recent barotrauma.  Does wear over ear headphones at work throughout the day.      Review of Systems   Constitutional: Negative for chills and fever.   HENT: Positive for congestion and ear pain. Negative for sore throat.    Eyes: Negative for pain.   Respiratory: Negative for cough and shortness of breath.    Cardiovascular: Negative for chest pain.   Gastrointestinal: Negative for abdominal pain, constipation, diarrhea, nausea and vomiting.   Genitourinary: Negative for dysuria.   Musculoskeletal: Negative for myalgias.   Skin: Negative for rash.   Neurological: Negative for headaches.       Medications, Allergies, and current problem list reviewed today in Epic.     Objective:     /74 (BP Location: Right arm, Patient Position: Sitting, BP Cuff Size: Adult)   Pulse 86   Temp 36.9 °C (98.4 °F) (Temporal)   Resp 16   Ht 1.626 m (5' 4\")   Wt 86.6 kg (191 lb)   SpO2 97%     Physical Exam  Vitals reviewed.   Constitutional:       Appearance: Normal appearance.   HENT:      Head: Normocephalic and atraumatic.      Right Ear: Tympanic membrane and external ear normal.      Left Ear: Tympanic membrane and external ear normal.      Ears:      Comments: Mild erythema without maceration or edema of canals bilaterally.  Pearly gray TMs.  External ear normal.  No mastoid tenderness.  No reactive periauricular lymphadenopathy     Nose: Rhinorrhea present. No congestion.      Mouth/Throat:      Mouth: Mucous membranes are moist.   Eyes:      Conjunctiva/sclera: " Conjunctivae normal.      Pupils: Pupils are equal, round, and reactive to light.   Cardiovascular:      Rate and Rhythm: Normal rate and regular rhythm.   Pulmonary:      Effort: Pulmonary effort is normal.      Breath sounds: Normal breath sounds.   Musculoskeletal:      Cervical back: Normal range of motion.   Lymphadenopathy:      Cervical: No cervical adenopathy.   Skin:     General: Skin is warm and dry.      Capillary Refill: Capillary refill takes less than 2 seconds.   Neurological:      Mental Status: She is alert and oriented to person, place, and time.         Assessment/Plan:     Diagnosis and associated orders:     1. Non-recurrent acute serous otitis media of both ears  neomycin-polymyxin-HC (PEDIOTIC HC) 3.5-91722-6 Suspension    azelastine (ASTELIN) 137 MCG/SPRAY nasal spray      Comments/MDM:     • Appears to be some otitis externa with serous otitis media.  Recommend use of antihistamine, intranasal antihistamine, nasal steroid as well as the Pediotic drops.         Differential diagnosis, natural history, supportive care, and indications for immediate follow-up discussed.    Advised the patient to follow-up with the primary care physician for recheck, reevaluation, and consideration of further management.    Please note that this dictation was created using voice recognition software. I have made a reasonable attempt to correct obvious errors, but I expect that there are errors of grammar and possibly content that I did not discover before finalizing the note.    This note was electronically signed by Felice Kyle PA-C

## 2022-05-17 ENCOUNTER — APPOINTMENT (OUTPATIENT)
Dept: RADIOLOGY | Facility: MEDICAL CENTER | Age: 32
End: 2022-05-17
Attending: EMERGENCY MEDICINE

## 2022-05-17 ENCOUNTER — HOSPITAL ENCOUNTER (EMERGENCY)
Facility: MEDICAL CENTER | Age: 32
End: 2022-05-17
Attending: EMERGENCY MEDICINE
Payer: COMMERCIAL

## 2022-05-17 VITALS
HEIGHT: 64 IN | OXYGEN SATURATION: 95 % | DIASTOLIC BLOOD PRESSURE: 81 MMHG | HEART RATE: 72 BPM | TEMPERATURE: 98.5 F | RESPIRATION RATE: 18 BRPM | SYSTOLIC BLOOD PRESSURE: 126 MMHG | BODY MASS INDEX: 33.65 KG/M2 | WEIGHT: 197.09 LBS

## 2022-05-17 DIAGNOSIS — H53.9 VISION CHANGES: ICD-10-CM

## 2022-05-17 LAB
ABO GROUP BLD: NORMAL
ALBUMIN SERPL BCP-MCNC: 4.8 G/DL (ref 3.2–4.9)
ALBUMIN/GLOB SERPL: 1.8 G/DL
ALP SERPL-CCNC: 81 U/L (ref 30–99)
ALT SERPL-CCNC: 22 U/L (ref 2–50)
ANION GAP SERPL CALC-SCNC: 13 MMOL/L (ref 7–16)
APTT PPP: 26.4 SEC (ref 24.7–36)
AST SERPL-CCNC: 19 U/L (ref 12–45)
BASOPHILS # BLD AUTO: 0.2 % (ref 0–1.8)
BASOPHILS # BLD: 0.01 K/UL (ref 0–0.12)
BILIRUB SERPL-MCNC: 0.5 MG/DL (ref 0.1–1.5)
BLD GP AB SCN SERPL QL: NORMAL
BUN SERPL-MCNC: 10 MG/DL (ref 8–22)
CALCIUM SERPL-MCNC: 9.3 MG/DL (ref 8.5–10.5)
CHLORIDE SERPL-SCNC: 103 MMOL/L (ref 96–112)
CO2 SERPL-SCNC: 21 MMOL/L (ref 20–33)
CREAT SERPL-MCNC: 0.58 MG/DL (ref 0.5–1.4)
EKG IMPRESSION: NORMAL
EOSINOPHIL # BLD AUTO: 0.05 K/UL (ref 0–0.51)
EOSINOPHIL NFR BLD: 1.1 % (ref 0–6.9)
ERYTHROCYTE [DISTWIDTH] IN BLOOD BY AUTOMATED COUNT: 41.8 FL (ref 35.9–50)
GFR SERPLBLD CREATININE-BSD FMLA CKD-EPI: 123 ML/MIN/1.73 M 2
GLOBULIN SER CALC-MCNC: 2.7 G/DL (ref 1.9–3.5)
GLUCOSE BLD STRIP.AUTO-MCNC: 93 MG/DL (ref 65–99)
GLUCOSE SERPL-MCNC: 94 MG/DL (ref 65–99)
HCG SERPL QL: NEGATIVE
HCT VFR BLD AUTO: 49.3 % (ref 37–47)
HGB BLD-MCNC: 16.8 G/DL (ref 12–16)
IMM GRANULOCYTES # BLD AUTO: 0.01 K/UL (ref 0–0.11)
IMM GRANULOCYTES NFR BLD AUTO: 0.2 % (ref 0–0.9)
INR PPP: 0.93 (ref 0.87–1.13)
LYMPHOCYTES # BLD AUTO: 1.14 K/UL (ref 1–4.8)
LYMPHOCYTES NFR BLD: 25.9 % (ref 22–41)
MCH RBC QN AUTO: 30.4 PG (ref 27–33)
MCHC RBC AUTO-ENTMCNC: 34.1 G/DL (ref 33.6–35)
MCV RBC AUTO: 89.2 FL (ref 81.4–97.8)
MONOCYTES # BLD AUTO: 0.48 K/UL (ref 0–0.85)
MONOCYTES NFR BLD AUTO: 10.9 % (ref 0–13.4)
NEUTROPHILS # BLD AUTO: 2.72 K/UL (ref 2–7.15)
NEUTROPHILS NFR BLD: 61.7 % (ref 44–72)
NRBC # BLD AUTO: 0 K/UL
NRBC BLD-RTO: 0 /100 WBC
PLATELET # BLD AUTO: 251 K/UL (ref 164–446)
PMV BLD AUTO: 9.8 FL (ref 9–12.9)
POTASSIUM SERPL-SCNC: 4 MMOL/L (ref 3.6–5.5)
PROT SERPL-MCNC: 7.5 G/DL (ref 6–8.2)
PROTHROMBIN TIME: 12.2 SEC (ref 12–14.6)
RBC # BLD AUTO: 5.53 M/UL (ref 4.2–5.4)
RH BLD: NORMAL
SODIUM SERPL-SCNC: 137 MMOL/L (ref 135–145)
TROPONIN T SERPL-MCNC: <6 NG/L (ref 6–19)
WBC # BLD AUTO: 4.4 K/UL (ref 4.8–10.8)

## 2022-05-17 PROCEDURE — 0042T CT-CEREBRAL PERFUSION ANALYSIS: CPT

## 2022-05-17 PROCEDURE — 85730 THROMBOPLASTIN TIME PARTIAL: CPT

## 2022-05-17 PROCEDURE — 71045 X-RAY EXAM CHEST 1 VIEW: CPT

## 2022-05-17 PROCEDURE — 70551 MRI BRAIN STEM W/O DYE: CPT

## 2022-05-17 PROCEDURE — 85025 COMPLETE CBC W/AUTO DIFF WBC: CPT

## 2022-05-17 PROCEDURE — 84484 ASSAY OF TROPONIN QUANT: CPT

## 2022-05-17 PROCEDURE — 99284 EMERGENCY DEPT VISIT MOD MDM: CPT | Performed by: PSYCHIATRY & NEUROLOGY

## 2022-05-17 PROCEDURE — 96374 THER/PROPH/DIAG INJ IV PUSH: CPT

## 2022-05-17 PROCEDURE — 86901 BLOOD TYPING SEROLOGIC RH(D): CPT

## 2022-05-17 PROCEDURE — 85610 PROTHROMBIN TIME: CPT

## 2022-05-17 PROCEDURE — 94760 N-INVAS EAR/PLS OXIMETRY 1: CPT

## 2022-05-17 PROCEDURE — 86900 BLOOD TYPING SEROLOGIC ABO: CPT

## 2022-05-17 PROCEDURE — 80053 COMPREHEN METABOLIC PANEL: CPT

## 2022-05-17 PROCEDURE — 70450 CT HEAD/BRAIN W/O DYE: CPT

## 2022-05-17 PROCEDURE — 99285 EMERGENCY DEPT VISIT HI MDM: CPT

## 2022-05-17 PROCEDURE — 700102 HCHG RX REV CODE 250 W/ 637 OVERRIDE(OP): Performed by: EMERGENCY MEDICINE

## 2022-05-17 PROCEDURE — 93005 ELECTROCARDIOGRAM TRACING: CPT | Performed by: EMERGENCY MEDICINE

## 2022-05-17 PROCEDURE — A9270 NON-COVERED ITEM OR SERVICE: HCPCS | Performed by: EMERGENCY MEDICINE

## 2022-05-17 PROCEDURE — 70496 CT ANGIOGRAPHY HEAD: CPT

## 2022-05-17 PROCEDURE — 700117 HCHG RX CONTRAST REV CODE 255: Performed by: EMERGENCY MEDICINE

## 2022-05-17 PROCEDURE — 70498 CT ANGIOGRAPHY NECK: CPT

## 2022-05-17 PROCEDURE — 36415 COLL VENOUS BLD VENIPUNCTURE: CPT

## 2022-05-17 PROCEDURE — 82962 GLUCOSE BLOOD TEST: CPT

## 2022-05-17 PROCEDURE — 86850 RBC ANTIBODY SCREEN: CPT

## 2022-05-17 PROCEDURE — 84703 CHORIONIC GONADOTROPIN ASSAY: CPT

## 2022-05-17 RX ORDER — PROCHLORPERAZINE EDISYLATE 5 MG/ML
10 INJECTION INTRAMUSCULAR; INTRAVENOUS ONCE
Status: DISCONTINUED | OUTPATIENT
Start: 2022-05-17 | End: 2022-05-17 | Stop reason: HOSPADM

## 2022-05-17 RX ORDER — DIPHENHYDRAMINE HYDROCHLORIDE 50 MG/ML
25 INJECTION INTRAMUSCULAR; INTRAVENOUS ONCE
Status: DISCONTINUED | OUTPATIENT
Start: 2022-05-17 | End: 2022-05-17 | Stop reason: HOSPADM

## 2022-05-17 RX ORDER — IBUPROFEN 600 MG/1
600 TABLET ORAL EVERY 6 HOURS PRN
Status: DISCONTINUED | OUTPATIENT
Start: 2022-05-17 | End: 2022-05-17 | Stop reason: HOSPADM

## 2022-05-17 RX ADMIN — IOPAMIDOL 80 ML: 755 INJECTION, SOLUTION INTRAVENOUS at 10:42

## 2022-05-17 RX ADMIN — IBUPROFEN 600 MG: 600 TABLET, FILM COATED ORAL at 11:09

## 2022-05-17 RX ADMIN — IOPAMIDOL 40 ML: 755 INJECTION, SOLUTION INTRAVENOUS at 10:46

## 2022-05-17 ASSESSMENT — ENCOUNTER SYMPTOMS
MYALGIAS: 0
NAUSEA: 0
CHILLS: 0
BLURRED VISION: 1
DOUBLE VISION: 0
SHORTNESS OF BREATH: 0
SEIZURES: 0
TINGLING: 0
FALLS: 0
HEADACHES: 1
SPEECH CHANGE: 0
NECK PAIN: 0
WEAKNESS: 0
TREMORS: 0
FOCAL WEAKNESS: 0
DIZZINESS: 0
SENSORY CHANGE: 1
FEVER: 0
LOSS OF CONSCIOUSNESS: 0
VOMITING: 0
BACK PAIN: 0
MEMORY LOSS: 0
PHOTOPHOBIA: 0

## 2022-05-17 ASSESSMENT — FIBROSIS 4 INDEX: FIB4 SCORE: 0.4

## 2022-05-17 NOTE — ED NOTES
Updated to poc. S/o remains at bedside. Pt continues to have vision loss to left eye, right eye vision improving. Left face decreased sensation. Continues to rondon, denies pain currently. A&O x 4.

## 2022-05-17 NOTE — ED NOTES
EKG orders received. Patient offered chaperone. Patient declined. EKG performed without incident.

## 2022-05-17 NOTE — ED TRIAGE NOTES
"Chief Complaint   Patient presents with   • Blurred Vision     L eye worse than R eye, onset approx 30 minutes with progression, 'it's like a kaleidoscope of blurry', pt states no hx of such but has hx of partial complex epilepsy and absence seizures. Pt states she has never had these sx associated with any seizure. Pt states recent elevated BP X 3 months, states no hx of HTN. Pt denies recent trauma. Pt states she does have a hx of anxiety starting last year, does not take anxiety medication.    • Headache     Pt states no HA with onset of blurriness but does have a R sided HA behind R eye, 'it feels more like sinus pressure', hx of migraines.    • Numbness     L side of face, pt has decreased sensation, pt has no other numbness or weakness, no facial droop noted      Pt ambulatory to triage with some dizziness for above complaints, VSS on RA, GCS 15, pt is anxious.    Denies all s/sx of covid, denies recent travel, denies fevers.    Charge RN notified of pt condition.     BP (!) 169/113   Pulse 81   Temp 36.9 °C (98.5 °F) (Temporal)   Resp 16   Ht 1.626 m (5' 4\")   Wt 89.4 kg (197 lb 1.5 oz)   SpO2 98%   BMI 33.83 kg/m²     "

## 2022-05-17 NOTE — ED NOTES
Pt ambulated to bathroom steady gait. Urine collected and sent.  ERP to bedside and upgraded to code stroke. IV placed labs obtained and sent.

## 2022-05-17 NOTE — CONSULTS
Referring Physician: Dr. Zeb Rubio    Referral Reason: Stroke alert with visual impairment    HPI:  Ms. Natasha Pino is a 31 y.o. right-handed female with history of partial complex seizure in the past who is not on any antiepileptic medication since 2019, presented to emergency room for acute onset of headache, left facial numbness and visual changes which is described as initially tunnel vision but subsequently only left eye profound blurry vision.  Patient underwent a brain CT followed by CT angiogram of the head and neck which were both unremarkable.  CT perfusion revealed 5 mm of mismatch although, this appears artifactual.  By the time she returned from CT scanner her headache had completely resolved and her visual impairment transition to only the left eye.  She did not have any numbness of the face at the time of my evaluation.  She has history of 3 migrainous headache in the past.  She is not on any medication at home.      ROS:   Review of Systems   Constitutional: Negative for chills, fever and malaise/fatigue.   HENT: Negative for hearing loss and tinnitus.    Eyes: Positive for blurred vision. Negative for double vision and photophobia.   Respiratory: Negative for shortness of breath.    Cardiovascular: Negative for chest pain.   Gastrointestinal: Negative for nausea and vomiting.   Genitourinary: Negative for hematuria.   Musculoskeletal: Negative for back pain, falls, myalgias and neck pain.   Skin: Negative for rash.   Neurological: Positive for sensory change and headaches. Negative for dizziness, tingling, tremors, speech change, focal weakness, seizures, loss of consciousness and weakness.   Psychiatric/Behavioral: Negative for memory loss.       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) 5/5/2017   • Migraine without aura and without status  migrainosus, not intractable 5/5/2017       Past Surgical History:   Past Surgical History:   Procedure Laterality Date   • LEEP  2011   • APPENDECTOMY         Social History:   Social History     Socioeconomic History   • Marital status:      Spouse name: Not on file   • Number of children: Not on file   • Years of education: Not on file   • Highest education level: Some college, no degree   Occupational History   • Not on file   Tobacco Use   • Smoking status: Never Smoker   • Smokeless tobacco: Never Used   Vaping Use   • Vaping Use: Never used   Substance and Sexual Activity   • Alcohol use: Not Currently     Alcohol/week: 0.0 oz     Comment: Socially   • Drug use: Not Currently   • Sexual activity: Yes     Partners: Male     Comment:    Other Topics Concern   • Not on file   Social History Narrative   • Not on file     Social Determinants of Health     Financial Resource Strain: Low Risk    • Difficulty of Paying Living Expenses: Not very hard   Food Insecurity: No Food Insecurity   • Worried About Running Out of Food in the Last Year: Never true   • Ran Out of Food in the Last Year: Never true   Transportation Needs: No Transportation Needs   • Lack of Transportation (Medical): No   • Lack of Transportation (Non-Medical): No   Physical Activity: Insufficiently Active   • Days of Exercise per Week: 3 days   • Minutes of Exercise per Session: 20 min   Stress: Stress Concern Present   • Feeling of Stress : To some extent   Social Connections: Moderately Isolated   • Frequency of Communication with Friends and Family: More than three times a week   • Frequency of Social Gatherings with Friends and Family: Once a week   • Attends Judaism Services: Never   • Active Member of Clubs or Organizations: No   • Attends Club or Organization Meetings: Patient refused   • Marital Status:    Intimate Partner Violence: Not on file   Housing Stability: High Risk   • Unable to Pay for Housing in the Last  "Year: Yes   • Number of Places Lived in the Last Year: 2   • Unstable Housing in the Last Year: No       Family Hx:   Family History   Problem Relation Age of Onset   • Cancer Mother         ovarian CA  and cervical CA (in her mid 30s)   • Hyperlipidemia Father    • Heart Disease Father         MI x2   • Seizures Sister    • GI Disease Sister         \"intestinal problem\"   • Diabetes Maternal Grandmother    • Cancer Maternal Grandmother         breast       Current Medications:   Current Facility-Administered Medications   Medication Dose Route Frequency Provider Last Rate Last Admin   • diphenhydrAMINE (BENADRYL) injection 25 mg  25 mg Intravenous Once Zeb Rubio M.D.       • prochlorperazine (COMPAZINE) injection 10 mg  10 mg Intravenous Once Zeb Rubio M.D.       • ibuprofen (MOTRIN) tablet 600 mg  600 mg Oral Q6HRS PRN Zeb Rubio M.D.   600 mg at 05/17/22 1109     Current Outpatient Medications   Medication Sig Dispense Refill   • JENCYCLA 0.35 MG tablet Take 1 Tablet by mouth every day.     • azelastine (ASTELIN) 137 MCG/SPRAY nasal spray Administer 1 Spray into affected nostril(S) 2 times a day. 30 mL 0   • hydrOXYzine HCl (ATARAX) 25 MG Tab Take 1 Tablet by mouth 3 times a day as needed for Itching. (Patient not taking: Reported on 4/3/2022) 30 Tablet 0   • Prenatal MV & Min w/FA-DHA (PRENATAL GUMMIES PO) Take 4 Tablets by mouth every morning.         Allergies:   Allergies   Allergen Reactions   • Keppra [Levetiracetam] Unspecified     Severe Depression    • Mushroom Extract Complex Anaphylaxis     Pt reports her throat swells       Physical Exam:   Vitals:    05/17/22 0932 05/17/22 0936 05/17/22 1040 05/17/22 1101   BP: (!) 169/113  129/85 (!) 136/90   Pulse: 81  84 88   Resp: 16      Temp: 36.9 °C (98.5 °F)      TempSrc: Temporal      SpO2: 98%  95% 96%   Weight:  89.4 kg (197 lb 1.5 oz)     Height: 1.626 m (5' 4\")          Physical Exam   GENERAL:  Lying in the hospital bed in no apparent " distress.  Head: Normocephalic and atraumatic.   Eyes: Pupils are equal, round, and reactive to light. EOM are normal.  Initially had some tunnel vision and subsequently left visual field defect.  Cardiovascular: Normal rate and regular rhythm.    Pulmonary/Chest: Breath sounds normal.   Abdominal: Soft. Bowel sounds are normal. He exhibits no distension. There is no tenderness.   Skin: Skin is warm and dry. No rash noted. No erythema.  Neuro Exam  MENTAL STATUS:  Awake, alert, oriented times 3.  Speech is fluent, comprehension is intact.  CRANIAL NERVES:  PERRL, EOMI with no nystagmus, face is symmetric, facial sensation is intact, tongue is in the midline, palate is symmetric. Hearing is intact to finger rub bilaterally. Shoulder shrugs are normal.  MOTOR:  Motor examination showed normal strength in direct testing of both upper and lower extremities, proximal and distal.    SENSATION:  Intact to light touch, temperature and proprioception throughout  REFLEXES:  2+ and symmetric, toes are downgoing bilaterally  COORDINATION:  Normal finger to nose and heel to shin bilaterally  GAIT:  Deferred       NIH Stroke Scale:    1a. Level of Consciousness (Alert, drowsy, etc): 0= Alert    1b. LOC Questions (Month, age): 0= Answers both correctly    1c. LOC Commands (Open/close eyes make fist/let go): 0= Obeys both correctly    2.   Best Gaze (Eyes open - patient follows examiner's finger on face): 0= Normal    3.   Visual Fields (introduce visual stimulus/threat to patient's field quadrants): 1= Partial Hemiania    4.   Facial Paresis (Show teeth, raise eyebrows and squeeze eyes shut): 0= Normal     5a. Motor Arm - Left (Elevate arm to 90 degrees if patient is sitting, 45 degrees if  supine): 0= No drift    5b. Motor Arm - Right (Elevate arm to 90 degrees if patient is sitting, 45 degrees if supine): 0= No drift    6a. Motor Leg - Left (Elevate leg 30 degrees with patient supine): 0= No drift    6b. Motor Leg - Right   (Elevate leg 30 degrees with patient supine): 0= No drift    7.   Limb Ataxia (Finger-nose, heel down shin): 0= No ataxia    8.   Sensory (Pin prick to face, arm, trunk and leg - compare side to side): 0= Normal    9.  Best Language (Name item, describe a picture and read sentences): 0= No aphasia    10. Dysarthria (Evaluate speech clarity by patient repeating listed words): 0= Normal articulation    11. Extinction and Inattention (Use information from prior testing to identify neglect or  double simultaneous stimuli testing): 0= No neglect    Total NIH Score: 1     Modified Bradley Scale (MRS): 1 = No significant disability, despite symptoms; able to perform all usual duties and activities      Labs:  Recent Labs     05/17/22  1010   WBC 4.4*   RBC 5.53*   HEMOGLOBIN 16.8*   HEMATOCRIT 49.3*   MCV 89.2   MCH 30.4   MCHC 34.1   RDW 41.8   PLATELETCT 251   MPV 9.8     Recent Labs     05/17/22  1010   SODIUM 137   POTASSIUM 4.0   CHLORIDE 103   CO2 21   GLUCOSE 94   BUN 10   CREATININE 0.58   CALCIUM 9.3     Recent Labs     05/17/22  1010   APTT 26.4   INR 0.93                 Recent Labs     05/17/22  1010   SODIUM 137   POTASSIUM 4.0   CHLORIDE 103   CO2 21   GLUCOSE 94   BUN 10     Recent Labs     05/17/22  1010   SODIUM 137   POTASSIUM 4.0   CHLORIDE 103   CO2 21   BUN 10   CREATININE 0.58   CALCIUM 9.3     Recent Labs     05/17/22  1010   APTT 26.4   INR 0.93     No results found for this or any previous visit.      Imaging reviewed:    MR-BRAIN-W/O   Final Result         Brain MRI within normal limits.      DX-CHEST-PORTABLE (1 VIEW)   Final Result      No acute cardiac or pulmonary abnormalities are identified.      CT-CEREBRAL PERFUSION ANALYSIS   Final Result      1.  Cerebral blood flow less than 30% likely representing completed infarct = 0 mL.      2.  T Max more than 6 seconds likely representing combination of completed infarct and ischemia = 5 mL.      3.  Please note that the cerebral perfusion was  performed on the limited brain tissue around the basal ganglia region. Infarct/ischemia outside the CT perfusion sections can be missed in this study.      CT-CTA NECK WITH & W/O-POST PROCESSING   Final Result      1.  No focal stenosis, dissection or occlusion of the cervical carotid or vertebral arteries.   2.  Mild reversal of curvature of cervical spine.      CT-CTA HEAD WITH & W/O-POST PROCESS   Final Result      No intracranial aneurysm, focal stenosis or abrupt large vessel cut off.      CT-HEAD W/O   Final Result      1.  No acute intracranial abnormality.   2.  Low lying cerebellar tonsils.                Assessment/Plan:   31 y.o. female with history of complex partial seizure who has been seizure-free since 2018 and has been off antiepileptic medication since 2019, presented with right-sided headache associated with visual impairment and right facial numbness.  Her headache resolved in the emergency room and she continued with some visual impairment, initially manifest as tunnel vision and subsequently transitioned to left eye blurriness.  She underwent brain CT followed by CT angiogram of the head and neck and CT perfusion which were all unremarkable.  CT perfusion appears artifactual.  She is not a candidate for administration of thrombolytics or thrombectomy.  Brain MRI requested which is also unremarkable with no evidence of acute stroke or other pathology.  Her most likely diagnosis is complex migraine.  If no primary ophthalmological pathology, she is cleared from neurological standpoint for discharge.

## 2022-05-17 NOTE — ED PROVIDER NOTES
ED Provider Note    CHIEF COMPLAINT  Chief Complaint   Patient presents with   • Blurred Vision     L eye worse than R eye, onset approx 30 minutes with progression, 'it's like a kaleidoscope of blurry', pt states no hx of such but has hx of partial complex epilepsy and absence seizures. Pt states she has never had these sx associated with any seizure. Pt states recent elevated BP X 3 months, states no hx of HTN. Pt denies recent trauma. Pt states she does have a hx of anxiety starting last year, does not take anxiety medication.    • Headache     Pt states no HA with onset of blurriness but does have a R sided HA behind R eye, 'it feels more like sinus pressure', hx of migraines.    • Numbness     L side of face, pt has decreased sensation, pt has no other numbness or weakness, no facial droop noted        HPI  Natasha Pino is a 31 y.o. female who presents with a chief complaint of left-sided facial numbness, headache, and vision change that started approximately 1 hour prior to my evaluation.  Patient does have a history of seizures and migraine headaches but states that this does not feel like either.  She is not currently on any antiseizure medication.  She has not tried any medication for her symptoms.  She does not have any history of trauma.  This is not the worst headache of her life.  She has no weakness or numbness in her extremities.  She does not have any photophobia or phonophobia but does have nausea.  She has not vomited.  She denies any history of similar symptoms.    REVIEW OF SYSTEMS  See HPI for further details.  Headache.  Left-sided facial numbness.  Visual changes.  All other systems are negative.     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    SOCIAL HISTORY  Social History     Tobacco  "Use   • Smoking status: Never Smoker   • Smokeless tobacco: Never Used   Vaping Use   • Vaping Use: Never used   Substance and Sexual Activity   • Alcohol use: Not Currently     Alcohol/week: 0.0 oz     Comment: Socially   • Drug use: Not Currently   • Sexual activity: Yes     Partners: Male     Comment:        SURGICAL HISTORY   has a past surgical history that includes leep (2011) and appendectomy.     FAMILY HISTORY  Not contributory.    CURRENT MEDICATIONS  Home Medications     Reviewed by Zeke Mena R.N. (Registered Nurse) on 05/17/22 at 0941  Med List Status: <None>   Medication Last Dose Status   azelastine (ASTELIN) 137 MCG/SPRAY nasal spray  Active   hydrOXYzine HCl (ATARAX) 25 MG Tab  Active   JENCYCLA 0.35 MG tablet  Active   Prenatal MV & Min w/FA-DHA (PRENATAL GUMMIES PO)  Active                ALLERGIES  Allergies   Allergen Reactions   • Keppra [Levetiracetam] Unspecified     Severe Depression    • Mushroom Extract Complex Anaphylaxis     Pt reports her throat swells       PHYSICAL EXAM  VITAL SIGNS: BP (!) 169/113   Pulse 81   Temp 36.9 °C (98.5 °F) (Temporal)   Resp 16   Ht 1.626 m (5' 4\")   Wt 89.4 kg (197 lb 1.5 oz)   SpO2 98%   BMI 33.83 kg/m²    Pulse ox interpretation: I interpret this pulse ox as normal.  Constitutional: Alert in no apparent distress.  HENT: No signs of trauma, Bilateral external ears normal, Nose normal.  Moist mucous membranes.  Eyes: Pupils are equal and reactive, Conjunctiva normal, Non-icteric.   Neck: Normal range of motion, No tenderness, Supple, No stridor.   Lymphatic: No lymphadenopathy noted.   Cardiovascular: Regular rate and rhythm, no murmurs. Pulses symmetrical.  Thorax & Lungs: Normal breath sounds, No respiratory distress, No wheezing, No chest tenderness.   Abdomen: Bowel sounds normal, Soft, No tenderness, No masses, No pulsatile masses. No peritoneal signs.  Skin: Warm, Dry, No erythema, No rash.   Back: Normal alignment.  Extremities: " Intact distal pulses, No edema, No tenderness, No cyanosis.  Musculoskeletal: Good range of motion in all major joints.  No major deformities noted.   Neurologic: Alert, oriented x4, normal speech, decreased sensation over left side of face, left hemianopsia, no facial droop, normal motor function, Normal sensory function, No focal deficits noted.   Psychiatric: Anxious but pleasant and cooperative.    DIAGNOSTIC STUDIES / PROCEDURES    LABS  Results for orders placed or performed during the hospital encounter of 05/17/22   CBC WITH DIFFERENTIAL   Result Value Ref Range    WBC 4.4 (L) 4.8 - 10.8 K/uL    RBC 5.53 (H) 4.20 - 5.40 M/uL    Hemoglobin 16.8 (H) 12.0 - 16.0 g/dL    Hematocrit 49.3 (H) 37.0 - 47.0 %    MCV 89.2 81.4 - 97.8 fL    MCH 30.4 27.0 - 33.0 pg    MCHC 34.1 33.6 - 35.0 g/dL    RDW 41.8 35.9 - 50.0 fL    Platelet Count 251 164 - 446 K/uL    MPV 9.8 9.0 - 12.9 fL    Neutrophils-Polys 61.70 44.00 - 72.00 %    Lymphocytes 25.90 22.00 - 41.00 %    Monocytes 10.90 0.00 - 13.40 %    Eosinophils 1.10 0.00 - 6.90 %    Basophils 0.20 0.00 - 1.80 %    Immature Granulocytes 0.20 0.00 - 0.90 %    Nucleated RBC 0.00 /100 WBC    Neutrophils (Absolute) 2.72 2.00 - 7.15 K/uL    Lymphs (Absolute) 1.14 1.00 - 4.80 K/uL    Monos (Absolute) 0.48 0.00 - 0.85 K/uL    Eos (Absolute) 0.05 0.00 - 0.51 K/uL    Baso (Absolute) 0.01 0.00 - 0.12 K/uL    Immature Granulocytes (abs) 0.01 0.00 - 0.11 K/uL    NRBC (Absolute) 0.00 K/uL   COMP METABOLIC PANEL   Result Value Ref Range    Sodium 137 135 - 145 mmol/L    Potassium 4.0 3.6 - 5.5 mmol/L    Chloride 103 96 - 112 mmol/L    Co2 21 20 - 33 mmol/L    Anion Gap 13.0 7.0 - 16.0    Glucose 94 65 - 99 mg/dL    Bun 10 8 - 22 mg/dL    Creatinine 0.58 0.50 - 1.40 mg/dL    Calcium 9.3 8.5 - 10.5 mg/dL    AST(SGOT) 19 12 - 45 U/L    ALT(SGPT) 22 2 - 50 U/L    Alkaline Phosphatase 81 30 - 99 U/L    Total Bilirubin 0.5 0.1 - 1.5 mg/dL    Albumin 4.8 3.2 - 4.9 g/dL    Total Protein 7.5 6.0  - 8.2 g/dL    Globulin 2.7 1.9 - 3.5 g/dL    A-G Ratio 1.8 g/dL   PROTHROMBIN TIME   Result Value Ref Range    PT 12.2 12.0 - 14.6 sec    INR 0.93 0.87 - 1.13   APTT   Result Value Ref Range    APTT 26.4 24.7 - 36.0 sec   COD (ADULT)   Result Value Ref Range    ABO Grouping Only B     Rh Grouping Only POS     Antibody Screen-Cod NEG    TROPONIN   Result Value Ref Range    Troponin T <6 6 - 19 ng/L   BETA-HCG QUALITATIVE SERUM   Result Value Ref Range    Beta-Hcg Qualitative Serum Negative Negative   ESTIMATED GFR   Result Value Ref Range    GFR (CKD-EPI) 123 >60 mL/min/1.73 m 2   EKG (NOW)   Result Value Ref Range    Report       [facility] Emergency Dept.    Test Date:  2022  Pt Name:    MERLY LEAL           Department: ER  MRN:        [id number]                      Room:       [facility]  Gender:     Female                       Technician: [id number]  :        1990                   Requested By:OBEY LOPES  Order #:    [id number]                    Reading MD: Obey Lopes MD    Measurements  Intervals                                Axis  Rate:       74                           P:          31  UT:         154                          QRS:        40  QRSD:       87                           T:          0  QT:         410  QTc:        455    Interpretive Statements  Sinus rhythm  Borderline T abnormalities, inferior leads  Compared to ECG 2020 00:56:55  Sinus tachycardia no longer present  T-wave abnormality still present  Electronically Signed On 2022 19:35:23 PDT by Obey Lopes MD     POCT glucose device results   Result Value Ref Range    POC Glucose, Blood 93 65 - 99 mg/dL     RADIOLOGY  MR-BRAIN-W/O   Final Result         Brain MRI within normal limits.      DX-CHEST-PORTABLE (1 VIEW)   Final Result      No acute cardiac or pulmonary abnormalities are identified.      CT-CEREBRAL PERFUSION ANALYSIS   Final Result      1.  Cerebral blood flow less than 30% likely  representing completed infarct = 0 mL.      2.  T Max more than 6 seconds likely representing combination of completed infarct and ischemia = 5 mL.      3.  Please note that the cerebral perfusion was performed on the limited brain tissue around the basal ganglia region. Infarct/ischemia outside the CT perfusion sections can be missed in this study.      CT-CTA NECK WITH & W/O-POST PROCESSING   Final Result      1.  No focal stenosis, dissection or occlusion of the cervical carotid or vertebral arteries.   2.  Mild reversal of curvature of cervical spine.      CT-CTA HEAD WITH & W/O-POST PROCESS   Final Result      No intracranial aneurysm, focal stenosis or abrupt large vessel cut off.      CT-HEAD W/O   Final Result      1.  No acute intracranial abnormality.   2.  Low lying cerebellar tonsils.           COURSE & MEDICAL DECISION MAKING  Pertinent Labs & Imaging studies reviewed. (See chart for details)  Records obtained and reviewed: Patient does have a history of seizures and is followed by Fanny David NP at [facility].  She most recently was seen in clinic in February 2022.  She has been tried on multiple seizure medications including Keppra, Lamictal, and Depakote but had untoward side effects including suicidal ideation.  She trialed Vimpat which did not help because she had severe anxiety and made her vision blurry.  She has been off all seizure medication since March 2019 and has had no seizures.  Her last documented seizure was in October 2018.  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  She did not want to restart seizure medications and was looking into medicinal marijuana.  She was to follow-up with a family doctor.    This is a 31-year-old female with a history of seizure and migraine headaches who is here with new onset left-sided visual deficits that started approximately 1 hour prior to my arrival.  Given the patient's concerning neurologic exam she was made a code  stroke in order to facilitate timely imaging and neurology evaluation.  Accu-Chek is 93.    Patient was sent to the CT scanner after being evaluated by neurology.  There were no obvious neurologic deficits on CT imaging.  I did speak with Dr. Celis, on-call neurologist, who has recommended an MRI brain without contrast.    MRI brain did not demonstrate acute abnormality.  Results were discussed with neurology who suspects there may be a functional component.  I spoke with the on-call ophthalmologist, Dr. Martin, who felt that an in person ophthalmologic evaluation was warranted and said that he would be happy to see the patient in his office tomorrow or in the ER tonight based on patient preference.  Patient states she is unable to follow-up tomorrow and requests to be seen in the ER tonight.  This was communicated with ophthalmology.    1545 - Patient admitted to ED observation on 5/17/2022 pending in person evaluation by ophthalmology, Dr. Martin.    1900 - Patient was seen by ophthalmology.  Dr. Martin did not find an obvious abnormality on his exam.  He concurs with neurology that this is most likely a migrainous etiology.  He feels that she is safe for discharge from an ophthalmologic standpoint and can follow-up with him or neuro-ophthalmology as an outpatient.    Patient was reevaluated at bedside.  She is resting comfortably.  We discussed the low-lying cerebellar tonsils that were noted on CT scan and I have recommended she follow-up with her neurologist to discuss this as an outpatient.  Her mother-in-law is a nurse practitioner and they are both comfortable with plan for discharge.  I have recommended some Benadryl at nighttime to help with discomfort or sleeplessness over the next couple days.  She is not currently breast-feeding.  She is ultimately discharged in good and stable condition with very strict return precautions.  She will follow-up with both her primary care physician and neurologist this  week for recheck.    Patient is critically ill.   The patient continues to have: Visual deficit  The vital organ system that is affected is the: All are at risk  If untreated there is a high chance of deterioration into: Permanent morbidity/paralysis/permanent visual deficit, cardiac arrest, respiratory arrest, and eventually death.   The critical care that I am providing today is: Initial bedside evaluation resuscitation, interpretation of lab and imaging results, multiple bedside reevaluations, medication management, discussion with multiple consultants including neurology and ophthalmology, and preparation of the medical record  The critical that has been undertaken is medically complex.   There has been no overlap in critical care time.   Critical Care Time not including procedures: 40 minutes    The patient will not drink alcohol nor drive with prescribed medications. The patient will return for worsening symptoms and is stable at the time of discharge. The patient verbalizes understanding and will comply.    FINAL IMPRESSION  1. Vision changes         Electronically signed by: Zeb Rubio M.D., 5/17/2022 10:00 AM

## 2022-05-18 NOTE — ED NOTES
Discharged with mother in law. Pt understands follow up with her pcp and specialist. Return to ed for worsening symptoms or concern. All questions answered.

## 2022-05-18 NOTE — CONSULTS
"CC: decreased Vision OS>OD    HPI: 30 yo with hx of seizures (not on any meds 2/2 side effects) presents with 1 day of vision loss OS>OD. Started noticing kaleidoscopic images OU at work, OD started followed by OS. OD improved but OS blicd spot has persisted. States saw lights \"like Star Trek warping\" in both eyes. Had mild HA, which resolved. No hx of migraines. Had some mild facial numbness, no droop or weakness. No recent traumas, no hx of eye disease. +Mild nausea, which resolved.   POHx:  FABIÁN 1 year ago with optometrist, +RE. No past surgeries    Past Medical History:   Diagnosis Date   • Kidney disease     kidney stones   • Localization-related partial epilepsy with complex partial seizures (HCC) 5/5/2017   • Migraine without aura and without status migrainosus, not intractable 5/5/2017     Scheduled Medications   Medication Dose Frequency   • diphenhydrAMINE  25 mg Once   • prochlorperazine  10 mg Once       Current Facility-Administered Medications:   •  diphenhydrAMINE (BENADRYL) injection 25 mg, 25 mg, Intravenous, Once, Zeb Rubio M.D.  •  prochlorperazine (COMPAZINE) injection 10 mg, 10 mg, Intravenous, Once, Zeb Rubio M.D.  •  ibuprofen (MOTRIN) tablet 600 mg, 600 mg, Oral, Q6HRS PRN, Zeb Rubio M.D., 600 mg at 05/17/22 1109  No current outpatient medications on file.    ROS:  Eyes as above  +hx seizures  +mild nausea  Other systems neg x 10    Family History   Problem Relation Age of Onset   • Cancer Mother         ovarian CA  and cervical CA (in her mid 30s)   • Hyperlipidemia Father    • Heart Disease Father         MI x2   • Seizures Sister    • GI Disease Sister         \"intestinal problem\"   • Diabetes Maternal Grandmother    • Cancer Maternal Grandmother         breast     Social History     Tobacco Use   • Smoking status: Never Smoker   • Smokeless tobacco: Never Used   Vaping Use   • Vaping Use: Never used   Substance Use Topics   • Alcohol use: Not Currently     Alcohol/week: 0.0 " oz     Comment: Socially   • Drug use: Not Currently     IMAGING  CT-CEREBRAL PERFUSION ANALYSIS  Final Result     1.  Cerebral blood flow less than 30% likely representing completed infarct = 0 mL.     2.  T Max more than 6 seconds likely representing combination of completed infarct and ischemia = 5 mL.     3.  Please note that the cerebral perfusion was performed on the limited brain tissue around the basal ganglia region. Infarct/ischemia outside the CT perfusion sections can be missed in this study.     CT-CTA NECK WITH & W/O-POST PROCESSING  Final Result     1.  No focal stenosis, dissection or occlusion of the cervical carotid or vertebral arteries.  2.  Mild reversal of curvature of cervical spine.     CT-CTA HEAD WITH & W/O-POST PROCESS  Final Result     No intracranial aneurysm, focal stenosis or abrupt large vessel cut off.     CT-HEAD W/O  Final Result     1.  No acute intracranial abnormality.  2.  Low lying cerebellar tonsils.    MRI BRAIN  FINDINGS:     Diffusion-weighted images are normal. No acute infarction is identified.  A small right-sided retropharyngeal lymph node is noted, likely within normal limits given the age of the patient  The brainstem, cerebellum are normal. IACs, CP angles are normal. Intracranial flow-voids are normal.  There is no evidence of intracranial mass or mass effect. No evidence of midline shift.  There is no evidence of focal cerebral edema.  There are no extra axial collections.  Visualized intracranial arterial flow voids are within normal limits.  Bone marrow signal in the calvarium is within normal limits.  Included portions of the paranasal sinuses are within normal limits.  Included portions of the mastoid air cells are within normal limits.  Included portions of the orbits are within normal limits     IMPRESSION:        Brain MRI within normal limits.      EXAM:  Vitals:    05/17/22 1800   BP: (!) 130/91   Pulse: 80   Resp: 18   Temp:    SpO2: 95%       VA 3pt OD  with own glasses, <20/400 sc near, +CF OS  Pupils - No APD, reactive and equal OU  IOP 13/15 [employer]  Motility full  CF  - diffusely diminished OS  SLE - conj and cornea and lens clear OU  DFE - no ONH edema OU, 0.4 OU. Retina without heme or edema OU.    A/P:  30 yo with subjective visual disturbance, vision loss OS -   -Uncertain etiology. No retinal or optic nerve issues seen on the eye exam. Consider atypical migraine, given atypical time course with characteristic story, negative imaging studies, no evidence of APD on exam.  -Could consider MRI of optic nerves/orbits to rule out optic nerve pathology.  -Follow up as outpatient with me or with neuro-ophthalmology for formal HVF/OCT testing.     Vj Martin M.D.  [facility]  [phone]

## 2022-05-18 NOTE — DISCHARGE INSTRUCTIONS
You were seen in the ER for visual disturbances.  Thankfully, your labs and imaging are reassuring and did not demonstrate an acute abnormality that requires further work-up, consultation, or admission to the hospital.  You did have low-lying cerebellar tonsils that were noted on your head CT.  This should be discussed with your neurologist but does not represent an emergency condition.  You were seen by our on-call ophthalmologist and neurologist both of whom agree that this is likely a migraine headache.  You can take Benadryl at night to help with sleep and some of the discomfort.  Please follow-up with your primary care doctor, neurologist, and ophthalmologist.  Return immediately with new or worsening symptoms.  Good luck, I hope you feel better soon!

## 2022-05-18 NOTE — DISCHARGE SUMMARY
"Patient:Natasha Pino  Patient : 1990  Patient MRN: [id number]  Patient PCP: Tamara Coffman M.D.    Admit Date: 2022  Discharge Date and Time: 22 7:28 PM  Discharge Diagnosis:   1. Vision changes     Discharge Attending: Zeb Rubio M.D.  Discharge Service: ED Observation    ED Course  Natasha is a 31 y.o. female who was evaluated at [facility] for visual changes.  She was initially worked up as a stroke alert and was seen by both neurology and ophthalmology.  Ultimately it was felt that her symptoms were due to a migraine and she was discharged to follow-up with her neurologist and outpatient ophthalmology.    Discharge Exam:  /81   Pulse 72   Temp 36.9 °C (98.5 °F) (Temporal)   Resp 18   Ht 1.626 m (5' 4\")   Wt 89.4 kg (197 lb 1.5 oz)   SpO2 95%   BMI 33.83 kg/m² .    Constitutional: Awake and alert. Nontoxic  HENT:  Grossly normal  Eyes: Grossly normal  Neck: Normal range of motion  Cardiovascular: Normal heart rate   Thorax & Lungs: No respiratory distress  Skin:  No pathologic rash.   Extremities: Well perfused  Psychiatric: Affect normal    Labs  Results for orders placed or performed during the hospital encounter of 22   CBC WITH DIFFERENTIAL   Result Value Ref Range    WBC 4.4 (L) 4.8 - 10.8 K/uL    RBC 5.53 (H) 4.20 - 5.40 M/uL    Hemoglobin 16.8 (H) 12.0 - 16.0 g/dL    Hematocrit 49.3 (H) 37.0 - 47.0 %    MCV 89.2 81.4 - 97.8 fL    MCH 30.4 27.0 - 33.0 pg    MCHC 34.1 33.6 - 35.0 g/dL    RDW 41.8 35.9 - 50.0 fL    Platelet Count 251 164 - 446 K/uL    MPV 9.8 9.0 - 12.9 fL    Neutrophils-Polys 61.70 44.00 - 72.00 %    Lymphocytes 25.90 22.00 - 41.00 %    Monocytes 10.90 0.00 - 13.40 %    Eosinophils 1.10 0.00 - 6.90 %    Basophils 0.20 0.00 - 1.80 %    Immature Granulocytes 0.20 0.00 - 0.90 %    Nucleated RBC 0.00 /100 WBC    Neutrophils (Absolute) 2.72 2.00 - 7.15 K/uL    Lymphs (Absolute) 1.14 1.00 - 4.80 K/uL    Monos (Absolute) 0.48 0.00 - 0.85 " K/uL    Eos (Absolute) 0.05 0.00 - 0.51 K/uL    Baso (Absolute) 0.01 0.00 - 0.12 K/uL    Immature Granulocytes (abs) 0.01 0.00 - 0.11 K/uL    NRBC (Absolute) 0.00 K/uL   COMP METABOLIC PANEL   Result Value Ref Range    Sodium 137 135 - 145 mmol/L    Potassium 4.0 3.6 - 5.5 mmol/L    Chloride 103 96 - 112 mmol/L    Co2 21 20 - 33 mmol/L    Anion Gap 13.0 7.0 - 16.0    Glucose 94 65 - 99 mg/dL    Bun 10 8 - 22 mg/dL    Creatinine 0.58 0.50 - 1.40 mg/dL    Calcium 9.3 8.5 - 10.5 mg/dL    AST(SGOT) 19 12 - 45 U/L    ALT(SGPT) 22 2 - 50 U/L    Alkaline Phosphatase 81 30 - 99 U/L    Total Bilirubin 0.5 0.1 - 1.5 mg/dL    Albumin 4.8 3.2 - 4.9 g/dL    Total Protein 7.5 6.0 - 8.2 g/dL    Globulin 2.7 1.9 - 3.5 g/dL    A-G Ratio 1.8 g/dL   PROTHROMBIN TIME   Result Value Ref Range    PT 12.2 12.0 - 14.6 sec    INR 0.93 0.87 - 1.13   APTT   Result Value Ref Range    APTT 26.4 24.7 - 36.0 sec   COD (ADULT)   Result Value Ref Range    ABO Grouping Only B     Rh Grouping Only POS     Antibody Screen-Cod NEG    TROPONIN   Result Value Ref Range    Troponin T <6 6 - 19 ng/L   BETA-HCG QUALITATIVE SERUM   Result Value Ref Range    Beta-Hcg Qualitative Serum Negative Negative   ESTIMATED GFR   Result Value Ref Range    GFR (CKD-EPI) 123 >60 mL/min/1.73 m 2   EKG (NOW)   Result Value Ref Range    Report       [facility]    Test Date:  2022  Pt Name:    MERLY LEAL           Department: ER  MRN:        [id number]                      Room:        16  Gender:     Female                       Technician: [id number]  :        1990                   Requested By:OBEY LOPES  Order #:    [id number]                    Reading MD:    Measurements  Intervals                                Axis  Rate:       74                           P:          31  NM:         154                          QRS:        40  QRSD:       87                           T:          0  QT:         410  QTc:         455    Interpretive Statements  Sinus rhythm  Borderline T abnormalities, inferior leads  Compared to ECG 02/23/2020 00:56:55  Sinus tachycardia no longer present  T-wave abnormality still present     POCT glucose device results   Result Value Ref Range    POC Glucose, Blood 93 65 - 99 mg/dL       Radiology  MR-BRAIN-W/O   Final Result         Brain MRI within normal limits.      DX-CHEST-PORTABLE (1 VIEW)   Final Result      No acute cardiac or pulmonary abnormalities are identified.      CT-CEREBRAL PERFUSION ANALYSIS   Final Result      1.  Cerebral blood flow less than 30% likely representing completed infarct = 0 mL.      2.  T Max more than 6 seconds likely representing combination of completed infarct and ischemia = 5 mL.      3.  Please note that the cerebral perfusion was performed on the limited brain tissue around the basal ganglia region. Infarct/ischemia outside the CT perfusion sections can be missed in this study.      CT-CTA NECK WITH & W/O-POST PROCESSING   Final Result      1.  No focal stenosis, dissection or occlusion of the cervical carotid or vertebral arteries.   2.  Mild reversal of curvature of cervical spine.      CT-CTA HEAD WITH & W/O-POST PROCESS   Final Result      No intracranial aneurysm, focal stenosis or abrupt large vessel cut off.      CT-HEAD W/O   Final Result      1.  No acute intracranial abnormality.   2.  Low lying cerebellar tonsils.           Medications:   There are no discharge medications for this patient.      Discharge Condition: Stable    Electronically signed by: Zeb Rubio M.D., 5/17/2022 7:28 PM

## 2022-05-23 ENCOUNTER — APPOINTMENT (OUTPATIENT)
Dept: MEDICAL GROUP | Facility: PHYSICIAN GROUP | Age: 32
End: 2022-05-23
Payer: COMMERCIAL

## 2022-06-07 ENCOUNTER — HOSPITAL ENCOUNTER (OUTPATIENT)
Dept: LAB | Facility: MEDICAL CENTER | Age: 32
End: 2022-06-07
Attending: OBSTETRICS & GYNECOLOGY
Payer: COMMERCIAL

## 2022-06-07 PROCEDURE — 87624 HPV HI-RISK TYP POOLED RSLT: CPT

## 2022-06-07 PROCEDURE — 88175 CYTOPATH C/V AUTO FLUID REDO: CPT

## 2022-06-08 LAB — CYTOLOGY REG CYTOL: NORMAL

## 2022-06-19 LAB
HPV HR 12 DNA CVX QL NAA+PROBE: NEGATIVE
HPV16 DNA SPEC QL NAA+PROBE: NEGATIVE
HPV18 DNA SPEC QL NAA+PROBE: NEGATIVE
SPECIMEN SOURCE: NORMAL

## 2022-07-29 ENCOUNTER — PRE-ADMISSION TESTING (OUTPATIENT)
Dept: ADMISSIONS | Facility: MEDICAL CENTER | Age: 32
End: 2022-07-29
Attending: OBSTETRICS & GYNECOLOGY
Payer: COMMERCIAL

## 2022-08-02 ENCOUNTER — PRE-ADMISSION TESTING (OUTPATIENT)
Dept: ADMISSIONS | Facility: MEDICAL CENTER | Age: 32
End: 2022-08-02
Attending: OBSTETRICS & GYNECOLOGY
Payer: COMMERCIAL

## 2022-08-02 DIAGNOSIS — Z01.812 PRE-OPERATIVE LABORATORY EXAMINATION: ICD-10-CM

## 2022-08-02 LAB
ANION GAP SERPL CALC-SCNC: 12 MMOL/L (ref 7–16)
BUN SERPL-MCNC: 9 MG/DL (ref 8–22)
CALCIUM SERPL-MCNC: 9 MG/DL (ref 8.5–10.5)
CHLORIDE SERPL-SCNC: 103 MMOL/L (ref 96–112)
CO2 SERPL-SCNC: 23 MMOL/L (ref 20–33)
CREAT SERPL-MCNC: 0.93 MG/DL (ref 0.5–1.4)
ERYTHROCYTE [DISTWIDTH] IN BLOOD BY AUTOMATED COUNT: 42.2 FL (ref 35.9–50)
GFR SERPLBLD CREATININE-BSD FMLA CKD-EPI: 83 ML/MIN/1.73 M 2
GLUCOSE SERPL-MCNC: 99 MG/DL (ref 65–99)
HCG SERPL QL: NEGATIVE
HCT VFR BLD AUTO: 44 % (ref 37–47)
HGB BLD-MCNC: 15.2 G/DL (ref 12–16)
MCH RBC QN AUTO: 30.8 PG (ref 27–33)
MCHC RBC AUTO-ENTMCNC: 34.5 G/DL (ref 33.6–35)
MCV RBC AUTO: 89.2 FL (ref 81.4–97.8)
PLATELET # BLD AUTO: 325 K/UL (ref 164–446)
PMV BLD AUTO: 9.7 FL (ref 9–12.9)
POTASSIUM SERPL-SCNC: 4.1 MMOL/L (ref 3.6–5.5)
RBC # BLD AUTO: 4.93 M/UL (ref 4.2–5.4)
SODIUM SERPL-SCNC: 138 MMOL/L (ref 135–145)
WBC # BLD AUTO: 7.2 K/UL (ref 4.8–10.8)

## 2022-08-02 PROCEDURE — 36415 COLL VENOUS BLD VENIPUNCTURE: CPT

## 2022-08-02 PROCEDURE — 80048 BASIC METABOLIC PNL TOTAL CA: CPT

## 2022-08-02 PROCEDURE — 85027 COMPLETE CBC AUTOMATED: CPT

## 2022-08-02 PROCEDURE — 84703 CHORIONIC GONADOTROPIN ASSAY: CPT

## 2022-08-11 ENCOUNTER — ANESTHESIA EVENT (OUTPATIENT)
Dept: SURGERY | Facility: MEDICAL CENTER | Age: 32
End: 2022-08-11
Payer: COMMERCIAL

## 2022-08-12 ENCOUNTER — HOSPITAL ENCOUNTER (OUTPATIENT)
Facility: MEDICAL CENTER | Age: 32
End: 2022-08-12
Attending: OBSTETRICS & GYNECOLOGY | Admitting: OBSTETRICS & GYNECOLOGY
Payer: COMMERCIAL

## 2022-08-12 ENCOUNTER — ANESTHESIA (OUTPATIENT)
Dept: SURGERY | Facility: MEDICAL CENTER | Age: 32
End: 2022-08-12
Payer: COMMERCIAL

## 2022-08-12 VITALS
OXYGEN SATURATION: 99 % | TEMPERATURE: 97 F | HEART RATE: 67 BPM | RESPIRATION RATE: 14 BRPM | WEIGHT: 196.21 LBS | HEIGHT: 64 IN | SYSTOLIC BLOOD PRESSURE: 124 MMHG | DIASTOLIC BLOOD PRESSURE: 80 MMHG | BODY MASS INDEX: 33.5 KG/M2

## 2022-08-12 DIAGNOSIS — G25.2 COARSE TREMORS: ICD-10-CM

## 2022-08-12 DIAGNOSIS — E66.9 OBESITY (BMI 30-39.9): ICD-10-CM

## 2022-08-12 DIAGNOSIS — Z86.16 HISTORY OF COVID-19: ICD-10-CM

## 2022-08-12 DIAGNOSIS — Z83.3 FAMILY HISTORY OF DIABETES MELLITUS: ICD-10-CM

## 2022-08-12 DIAGNOSIS — F32.5 MAJOR DEPRESSIVE DISORDER WITH SINGLE EPISODE, IN FULL REMISSION (HCC): ICD-10-CM

## 2022-08-12 DIAGNOSIS — M94.0 COSTOCHONDRITIS: ICD-10-CM

## 2022-08-12 DIAGNOSIS — L65.9 HAIR LOSS: ICD-10-CM

## 2022-08-12 DIAGNOSIS — G89.18 POSTOPERATIVE PAIN: Primary | ICD-10-CM

## 2022-08-12 DIAGNOSIS — Z30.2 REQUEST FOR STERILIZATION: ICD-10-CM

## 2022-08-12 DIAGNOSIS — I82.612 SUPERFICIAL VENOUS THROMBOSIS OF ARM, LEFT: ICD-10-CM

## 2022-08-12 DIAGNOSIS — G43.009 MIGRAINE WITHOUT AURA AND WITHOUT STATUS MIGRAINOSUS, NOT INTRACTABLE: ICD-10-CM

## 2022-08-12 DIAGNOSIS — G40.209 LOCALIZATION-RELATED PARTIAL EPILEPSY WITH COMPLEX PARTIAL SEIZURES (HCC): ICD-10-CM

## 2022-08-12 DIAGNOSIS — R73.01 ELEVATED FASTING GLUCOSE: ICD-10-CM

## 2022-08-12 LAB
HCG UR QL: NEGATIVE
PATHOLOGY CONSULT NOTE: NORMAL

## 2022-08-12 PROCEDURE — 160036 HCHG PACU - EA ADDL 30 MINS PHASE I: Performed by: OBSTETRICS & GYNECOLOGY

## 2022-08-12 PROCEDURE — 700111 HCHG RX REV CODE 636 W/ 250 OVERRIDE (IP): Performed by: ANESTHESIOLOGY

## 2022-08-12 PROCEDURE — 00840 ANES IPER PX LOWER ABD NOS: CPT | Performed by: ANESTHESIOLOGY

## 2022-08-12 PROCEDURE — 700105 HCHG RX REV CODE 258: Performed by: OBSTETRICS & GYNECOLOGY

## 2022-08-12 PROCEDURE — 160002 HCHG RECOVERY MINUTES (STAT): Performed by: OBSTETRICS & GYNECOLOGY

## 2022-08-12 PROCEDURE — 81025 URINE PREGNANCY TEST: CPT

## 2022-08-12 PROCEDURE — 160048 HCHG OR STATISTICAL LEVEL 1-5: Performed by: OBSTETRICS & GYNECOLOGY

## 2022-08-12 PROCEDURE — 700101 HCHG RX REV CODE 250: Performed by: OBSTETRICS & GYNECOLOGY

## 2022-08-12 PROCEDURE — 160047 HCHG PACU  - EA ADDL 30 MINS PHASE II: Performed by: OBSTETRICS & GYNECOLOGY

## 2022-08-12 PROCEDURE — A9270 NON-COVERED ITEM OR SERVICE: HCPCS | Performed by: OBSTETRICS & GYNECOLOGY

## 2022-08-12 PROCEDURE — 160039 HCHG SURGERY MINUTES - EA ADDL 1 MIN LEVEL 3: Performed by: OBSTETRICS & GYNECOLOGY

## 2022-08-12 PROCEDURE — 160009 HCHG ANES TIME/MIN: Performed by: OBSTETRICS & GYNECOLOGY

## 2022-08-12 PROCEDURE — 88302 TISSUE EXAM BY PATHOLOGIST: CPT

## 2022-08-12 PROCEDURE — 110371 HCHG SHELL REV 272: Performed by: OBSTETRICS & GYNECOLOGY

## 2022-08-12 PROCEDURE — 700101 HCHG RX REV CODE 250: Performed by: ANESTHESIOLOGY

## 2022-08-12 PROCEDURE — 160046 HCHG PACU - 1ST 60 MINS PHASE II: Performed by: OBSTETRICS & GYNECOLOGY

## 2022-08-12 PROCEDURE — 160035 HCHG PACU - 1ST 60 MINS PHASE I: Performed by: OBSTETRICS & GYNECOLOGY

## 2022-08-12 PROCEDURE — 700102 HCHG RX REV CODE 250 W/ 637 OVERRIDE(OP): Performed by: OBSTETRICS & GYNECOLOGY

## 2022-08-12 PROCEDURE — 700102 HCHG RX REV CODE 250 W/ 637 OVERRIDE(OP): Performed by: ANESTHESIOLOGY

## 2022-08-12 PROCEDURE — 160025 RECOVERY II MINUTES (STATS): Performed by: OBSTETRICS & GYNECOLOGY

## 2022-08-12 PROCEDURE — A9270 NON-COVERED ITEM OR SERVICE: HCPCS | Performed by: ANESTHESIOLOGY

## 2022-08-12 PROCEDURE — 160028 HCHG SURGERY MINUTES - 1ST 30 MINS LEVEL 3: Performed by: OBSTETRICS & GYNECOLOGY

## 2022-08-12 RX ORDER — LIDOCAINE HYDROCHLORIDE 20 MG/ML
INJECTION, SOLUTION EPIDURAL; INFILTRATION; INTRACAUDAL; PERINEURAL PRN
Status: DISCONTINUED | OUTPATIENT
Start: 2022-08-12 | End: 2022-08-12 | Stop reason: SURG

## 2022-08-12 RX ORDER — LABETALOL HYDROCHLORIDE 5 MG/ML
5 INJECTION, SOLUTION INTRAVENOUS
Status: DISCONTINUED | OUTPATIENT
Start: 2022-08-12 | End: 2022-08-12 | Stop reason: HOSPADM

## 2022-08-12 RX ORDER — BUPIVACAINE HYDROCHLORIDE 2.5 MG/ML
INJECTION, SOLUTION EPIDURAL; INFILTRATION; INTRACAUDAL
Status: DISCONTINUED
Start: 2022-08-12 | End: 2022-08-12 | Stop reason: HOSPADM

## 2022-08-12 RX ORDER — DEXAMETHASONE SODIUM PHOSPHATE 4 MG/ML
INJECTION, SOLUTION INTRA-ARTICULAR; INTRALESIONAL; INTRAMUSCULAR; INTRAVENOUS; SOFT TISSUE PRN
Status: DISCONTINUED | OUTPATIENT
Start: 2022-08-12 | End: 2022-08-12 | Stop reason: SURG

## 2022-08-12 RX ORDER — MIDAZOLAM HYDROCHLORIDE 1 MG/ML
INJECTION INTRAMUSCULAR; INTRAVENOUS PRN
Status: DISCONTINUED | OUTPATIENT
Start: 2022-08-12 | End: 2022-08-12 | Stop reason: SURG

## 2022-08-12 RX ORDER — OXYCODONE HYDROCHLORIDE 5 MG/1
2.5-5 TABLET ORAL EVERY 6 HOURS PRN
Qty: 6 TABLET | Refills: 0 | Status: SHIPPED | OUTPATIENT
Start: 2022-08-12 | End: 2022-08-14

## 2022-08-12 RX ORDER — HYDROMORPHONE HYDROCHLORIDE 1 MG/ML
0.2 INJECTION, SOLUTION INTRAMUSCULAR; INTRAVENOUS; SUBCUTANEOUS
Status: DISCONTINUED | OUTPATIENT
Start: 2022-08-12 | End: 2022-08-12 | Stop reason: HOSPADM

## 2022-08-12 RX ORDER — HYDROMORPHONE HYDROCHLORIDE 1 MG/ML
0.4 INJECTION, SOLUTION INTRAMUSCULAR; INTRAVENOUS; SUBCUTANEOUS
Status: DISCONTINUED | OUTPATIENT
Start: 2022-08-12 | End: 2022-08-12 | Stop reason: HOSPADM

## 2022-08-12 RX ORDER — SODIUM CHLORIDE, SODIUM LACTATE, POTASSIUM CHLORIDE, CALCIUM CHLORIDE 600; 310; 30; 20 MG/100ML; MG/100ML; MG/100ML; MG/100ML
INJECTION, SOLUTION INTRAVENOUS CONTINUOUS
Status: ACTIVE | OUTPATIENT
Start: 2022-08-12 | End: 2022-08-12

## 2022-08-12 RX ORDER — OXYCODONE HCL 5 MG/5 ML
10 SOLUTION, ORAL ORAL
Status: COMPLETED | OUTPATIENT
Start: 2022-08-12 | End: 2022-08-12

## 2022-08-12 RX ORDER — DEXMEDETOMIDINE HYDROCHLORIDE 100 UG/ML
INJECTION, SOLUTION INTRAVENOUS PRN
Status: DISCONTINUED | OUTPATIENT
Start: 2022-08-12 | End: 2022-08-12 | Stop reason: SURG

## 2022-08-12 RX ORDER — PHENAZOPYRIDINE HYDROCHLORIDE 200 MG/1
200 TABLET, FILM COATED ORAL
Status: COMPLETED | OUTPATIENT
Start: 2022-08-12 | End: 2022-08-12

## 2022-08-12 RX ORDER — SODIUM CHLORIDE, SODIUM LACTATE, POTASSIUM CHLORIDE, CALCIUM CHLORIDE 600; 310; 30; 20 MG/100ML; MG/100ML; MG/100ML; MG/100ML
INJECTION, SOLUTION INTRAVENOUS CONTINUOUS
Status: DISCONTINUED | OUTPATIENT
Start: 2022-08-12 | End: 2022-08-12 | Stop reason: HOSPADM

## 2022-08-12 RX ORDER — MEPERIDINE HYDROCHLORIDE 25 MG/ML
12.5 INJECTION INTRAMUSCULAR; INTRAVENOUS; SUBCUTANEOUS
Status: DISCONTINUED | OUTPATIENT
Start: 2022-08-12 | End: 2022-08-12 | Stop reason: HOSPADM

## 2022-08-12 RX ORDER — HYDRALAZINE HYDROCHLORIDE 20 MG/ML
5 INJECTION INTRAMUSCULAR; INTRAVENOUS
Status: DISCONTINUED | OUTPATIENT
Start: 2022-08-12 | End: 2022-08-12 | Stop reason: HOSPADM

## 2022-08-12 RX ORDER — ONDANSETRON 2 MG/ML
4 INJECTION INTRAMUSCULAR; INTRAVENOUS
Status: COMPLETED | OUTPATIENT
Start: 2022-08-12 | End: 2022-08-12

## 2022-08-12 RX ORDER — BUPIVACAINE HYDROCHLORIDE AND EPINEPHRINE 2.5; 5 MG/ML; UG/ML
INJECTION, SOLUTION EPIDURAL; INFILTRATION; INTRACAUDAL; PERINEURAL
Status: DISCONTINUED | OUTPATIENT
Start: 2022-08-12 | End: 2022-08-12 | Stop reason: HOSPADM

## 2022-08-12 RX ORDER — PROMETHAZINE HYDROCHLORIDE 25 MG/1
12.5 SUPPOSITORY RECTAL EVERY 4 HOURS PRN
Status: DISCONTINUED | OUTPATIENT
Start: 2022-08-12 | End: 2022-08-12 | Stop reason: HOSPADM

## 2022-08-12 RX ORDER — CEFAZOLIN SODIUM 1 G/3ML
INJECTION, POWDER, FOR SOLUTION INTRAMUSCULAR; INTRAVENOUS PRN
Status: DISCONTINUED | OUTPATIENT
Start: 2022-08-12 | End: 2022-08-12 | Stop reason: SURG

## 2022-08-12 RX ORDER — HYDROMORPHONE HYDROCHLORIDE 1 MG/ML
0.1 INJECTION, SOLUTION INTRAMUSCULAR; INTRAVENOUS; SUBCUTANEOUS
Status: DISCONTINUED | OUTPATIENT
Start: 2022-08-12 | End: 2022-08-12 | Stop reason: HOSPADM

## 2022-08-12 RX ORDER — OXYCODONE HCL 5 MG/5 ML
5 SOLUTION, ORAL ORAL
Status: COMPLETED | OUTPATIENT
Start: 2022-08-12 | End: 2022-08-12

## 2022-08-12 RX ORDER — HALOPERIDOL 5 MG/ML
1 INJECTION INTRAMUSCULAR
Status: DISCONTINUED | OUTPATIENT
Start: 2022-08-12 | End: 2022-08-12 | Stop reason: HOSPADM

## 2022-08-12 RX ORDER — ONDANSETRON 2 MG/ML
INJECTION INTRAMUSCULAR; INTRAVENOUS PRN
Status: DISCONTINUED | OUTPATIENT
Start: 2022-08-12 | End: 2022-08-12 | Stop reason: SURG

## 2022-08-12 RX ORDER — DIPHENHYDRAMINE HYDROCHLORIDE 50 MG/ML
12.5 INJECTION INTRAMUSCULAR; INTRAVENOUS
Status: DISCONTINUED | OUTPATIENT
Start: 2022-08-12 | End: 2022-08-12 | Stop reason: HOSPADM

## 2022-08-12 RX ORDER — ACETAMINOPHEN 500 MG
1000 TABLET ORAL ONCE
Status: COMPLETED | OUTPATIENT
Start: 2022-08-12 | End: 2022-08-12

## 2022-08-12 RX ORDER — CELECOXIB 200 MG/1
400 CAPSULE ORAL ONCE
Status: COMPLETED | OUTPATIENT
Start: 2022-08-12 | End: 2022-08-12

## 2022-08-12 RX ORDER — IPRATROPIUM BROMIDE AND ALBUTEROL SULFATE 2.5; .5 MG/3ML; MG/3ML
3 SOLUTION RESPIRATORY (INHALATION)
Status: DISCONTINUED | OUTPATIENT
Start: 2022-08-12 | End: 2022-08-12 | Stop reason: HOSPADM

## 2022-08-12 RX ADMIN — FENTANYL CITRATE 50 MCG: 50 INJECTION, SOLUTION INTRAMUSCULAR; INTRAVENOUS at 15:03

## 2022-08-12 RX ADMIN — OXYCODONE HYDROCHLORIDE 5 MG: 5 SOLUTION ORAL at 17:41

## 2022-08-12 RX ADMIN — DEXMEDETOMIDINE 25 MCG: 200 INJECTION, SOLUTION INTRAVENOUS at 15:10

## 2022-08-12 RX ADMIN — FENTANYL CITRATE 50 MCG: 50 INJECTION, SOLUTION INTRAMUSCULAR; INTRAVENOUS at 15:26

## 2022-08-12 RX ADMIN — DEXAMETHASONE SODIUM PHOSPHATE 8 MG: 4 INJECTION, SOLUTION INTRA-ARTICULAR; INTRALESIONAL; INTRAMUSCULAR; INTRAVENOUS; SOFT TISSUE at 15:11

## 2022-08-12 RX ADMIN — PHENAZOPYRIDINE HYDROCHLORIDE 200 MG: 200 TABLET ORAL at 13:20

## 2022-08-12 RX ADMIN — PROPOFOL 150 MG: 10 INJECTION, EMULSION INTRAVENOUS at 15:05

## 2022-08-12 RX ADMIN — ONDANSETRON 4 MG: 2 INJECTION INTRAMUSCULAR; INTRAVENOUS at 15:48

## 2022-08-12 RX ADMIN — FENTANYL CITRATE 50 MCG: 50 INJECTION, SOLUTION INTRAMUSCULAR; INTRAVENOUS at 15:48

## 2022-08-12 RX ADMIN — SODIUM CHLORIDE, POTASSIUM CHLORIDE, SODIUM LACTATE AND CALCIUM CHLORIDE: 600; 310; 30; 20 INJECTION, SOLUTION INTRAVENOUS at 13:22

## 2022-08-12 RX ADMIN — CELECOXIB 400 MG: 200 CAPSULE ORAL at 13:19

## 2022-08-12 RX ADMIN — ACETAMINOPHEN 1000 MG: 500 TABLET ORAL at 13:20

## 2022-08-12 RX ADMIN — DEXMEDETOMIDINE 20 MCG: 200 INJECTION, SOLUTION INTRAVENOUS at 15:16

## 2022-08-12 RX ADMIN — ONDANSETRON 4 MG: 2 INJECTION INTRAMUSCULAR; INTRAVENOUS at 18:08

## 2022-08-12 RX ADMIN — MIDAZOLAM HYDROCHLORIDE 2 MG: 1 INJECTION, SOLUTION INTRAMUSCULAR; INTRAVENOUS at 15:02

## 2022-08-12 RX ADMIN — SUGAMMADEX 200 MG: 100 INJECTION, SOLUTION INTRAVENOUS at 15:48

## 2022-08-12 RX ADMIN — LIDOCAINE HYDROCHLORIDE 80 MG: 20 INJECTION, SOLUTION EPIDURAL; INFILTRATION; INTRACAUDAL at 15:05

## 2022-08-12 RX ADMIN — CEFAZOLIN 2 G: 330 INJECTION, POWDER, FOR SOLUTION INTRAMUSCULAR; INTRAVENOUS at 15:05

## 2022-08-12 RX ADMIN — FENTANYL CITRATE 50 MCG: 50 INJECTION, SOLUTION INTRAMUSCULAR; INTRAVENOUS at 15:54

## 2022-08-12 RX ADMIN — ROCURONIUM BROMIDE 50 MG: 10 INJECTION, SOLUTION INTRAVENOUS at 15:05

## 2022-08-12 ASSESSMENT — PAIN SCALES - GENERAL: PAIN_LEVEL: 0

## 2022-08-12 ASSESSMENT — FIBROSIS 4 INDEX: FIB4 SCORE: 0.41

## 2022-08-12 NOTE — ANESTHESIA TIME REPORT
Anesthesia Start and Stop Event Times     Date Time Event    8/12/2022 1421 Ready for Procedure     1500 Anesthesia Start     1606 Anesthesia Stop        Responsible Staff  08/12/22    Name Role Begin End    Shari Garland M.D. Anesth 1500 1606        Overtime Reason:  no overtime (within assigned shift)    Comments:

## 2022-08-12 NOTE — ANESTHESIA PREPROCEDURE EVALUATION
Case: [id number] Date/Time: 08/12/22 1345    Procedures:       LAPAROSCOPY      SALPINGECTOMY (Bilateral)    Pre-op diagnosis: DESIRES STERILITY    Location: [facility] ROOM 25 / SURGERY SAME DAY [facility]    Surgeons: Car Munguia M.D.          Relevant Problems   PULMONARY   (positive) History of COVID-19      NEURO   (positive) History of COVID-19   (positive) Localization-related partial epilepsy with complex partial seizures (HCC)   (positive) Migraine without aura and without status migrainosus, not intractable      CARDIAC   (positive) Migraine without aura and without status migrainosus, not intractable   (positive) Superficial venous thrombosis of arm, left      Other   (positive) Coarse tremors   (positive) Costochondritis   (positive) Major depressive disorder with single episode, in full remission (HCC)   (positive) Obesity (BMI 30-39.9)       Physical Exam    Airway   Mallampati: II  TM distance: >3 FB  Neck ROM: full       Cardiovascular - normal exam  Rhythm: regular  Rate: normal  (-) murmur     Dental - normal exam           Pulmonary - normal exam  Breath sounds clear to auscultation     Abdominal    Neurological - normal exam                 Anesthesia Plan    ASA 2       Plan - general       Airway plan will be ETT          Induction: intravenous    Postoperative Plan: Postoperative administration of opioids is intended.    Pertinent diagnostic labs and testing reviewed    Informed Consent:    Anesthetic plan and risks discussed with patient.    Use of blood products discussed with: patient whom consented to blood products.

## 2022-08-12 NOTE — OR NURSING
1605 - patient arrived to pacu.  2 rn identified.  Report received.  2 lap sites on abdomen, both closed with dermabond.  Each site clean, dry, and intact.  Breana-pad with scant amount of blood noted.      1620 - placed onto room air.  Patient declines pain and nausea.      1636 PERIPAD CHANGED. PT ALERT. TOLERATING LIQUIDS. VSS.    1700  AT BEDSIDE. DISCHARGE INSTRUCTIONS DISCUSSED. QUESTIONS ANSWERED. VSS.    1815 PT STABLE. PT DRESSED. INCISIONS SITES ARE CDI.     1830 PT AMBULATED TO RESTROOM AND VOIDED WITHOUT DIFFICULTY. '    1841 PT STABLE. PIV DC'D WITH TIP INTACT. PT TAKEN TO GARAGE IN WHEELCHAIR. CARE TRANSFERRED TO . NO S/S OF DISTRESS.

## 2022-08-12 NOTE — OR SURGEON
Immediate Post OP Note    PreOp Diagnosis: requests sterilization      PostOp Diagnosis: same      Procedure(s):  LAPAROSCOPY - Wound Class: Clean  BILATERAL SALPINGECTOMY - Wound Class: Clean    Surgeon(s):  Car Munguia M.D.    Anesthesiologist/Type of Anesthesia:  Anesthesiologist: Shari Garland M.D./General    Surgical Staff:  Circulator: Nicolasa Delaney R.N.  Scrub Person: Olimpia Hull  [place]: Kristi Bonilla R.N.    Specimens removed if any:  ID Type Source Tests Collected by Time Destination   A : Bilateral Fallopian Tubes Tissue Fallopian Tube PATHOLOGY SPECIMEN Car Munguia M.D. 8/12/2022  3:41 PM        Estimated Blood Loss: 3 CC    Findings:   Normal GYN organs    Complications: none        8/12/2022 4:06 PM Car Munguia M.D.

## 2022-08-12 NOTE — ANESTHESIA PROCEDURE NOTES
Airway    Date/Time: 8/12/2022 3:06 PM  Performed by: Shari Garland M.D.  Authorized by: Shari Garland M.D.     Location:  OR  Urgency:  Elective  Difficult Airway: No    Indications for Airway Management:  Anesthesia      Spontaneous Ventilation: absent    Sedation Level:  Deep  Preoxygenated: Yes    Patient Position:  Sniffing  Mask Difficulty Assessment:  1 - vent by mask  Final Airway Type:  Endotracheal airway  Final Endotracheal Airway:  ETT  Cuffed: Yes    Technique Used for Successful ETT Placement:  Direct laryngoscopy    Insertion Site:  Oral  Blade Type:  Justina  Laryngoscope Blade/Videolaryngoscope Blade Size:  3  ETT Size (mm):  7.0  Measured from:  Teeth  ETT to Teeth (cm):  21  Placement Verified by: auscultation and capnometry    Cormack-Lehane Classification:  Grade I - full view of glottis  Number of Attempts at Approach:  1

## 2022-08-13 NOTE — OP REPORT
DATE OF SERVICE:  2022     OPERATION:  Laparoscopic bilateral salpingectomy.     SURGEON:  Car Munguia MD     ASSISTANT:  None.     ANESTHESIOLOGIST:  Shari Garland MD     ANESTHESIA:  General.     PREOPERATIVE DIAGNOSES:  The patient requests permanent contraception and   cancer risk reduction.     POSTOPERATIVE DIAGNOSES:  The patient requests permanent contraception and   cancer risk reduction.     COMPLICATIONS:  None.     ESTIMATED BLOOD LOSS:  3 mL.     SPECIMENS SENT TO PATHOLOGY:  Both fallopian tubes.     INDICATIONS:  This 31-year-old lady is  3, para 2.  She came for   elective bilateral salpingectomy.  Preop serum pregnancy test was negative.     OPERATION:  The patient went to the OR.  General anesthesia was administered.    We placed her lower legs in padded Ruiz stirrups with her thighs slightly   flexed.  Bimanual exam under anesthesia revealed no pelvic masses.  She was   prepped and draped.  Timeout was done.  I visualized her cervix.  I sounded   the uterus to 8 cm.  I introduced an 8 cm long BRIAN device into the uterus for   uterine manipulation during the laparoscopy.     Laparoscopy was accomplished with two incisions, each infiltrated with 0.25%   Marcaine with epinephrine prior to the incision.  I made an 11 mm vertical   midline infraumbilical incision.  I introduced a Veress needle   intraperitoneally.  Carbon dioxide was insufflated.  The Veress needle was   withdrawn.  I introduced an 11 mm trocar and sheath with no difficulty.  The   pelvis, mid and upper abdomen appeared laparoscopically normal.  Both   fallopian tubes and ovaries were freely mobile.  The tubes were visible and   accessible all the way to their fimbriated ends.     I made a 7 mm midline suprapubic incision ,introduced a trocar, then atraumatic   grasping forceps through the sheath.     Bilateral salpingectomy was accomplished with the 5 mm diameter LigaSure   vessel sealer, which was passed  through the operative port of the scope.  I   meticulously sealed and divided fibrovascular attachments between the fimbria   and the ovaries on each side and then progressed up the mesosalpinx, sealing   and dividing.  Both fallopian tubes were ultimately divided approximately 1.0   cm from the uterus.  Both tubal specimens were extracted through the 11 mm   sheath and sent to pathology.  Excellent hemostasis was noted on both sides.    The abdomen was desufflated.  Both sheaths were removed.  I closed the fascia   beneath the umbilicus with a figure-of-eight suture of 0 Vicryl.  I closed   both skin incisions with subcuticular 4-0 Vicryl.  Dermabond wound adhesive   was placed on both incisions.  The BRIAN device was removed from the uterus   intact.  Sponge and needle counts were correct.        ______________________________  MD DAVID Kennedy/ADITYA    DD:  08/12/2022 16:19  DT:  08/12/2022 18:21    Job#:  [id number]

## 2022-08-13 NOTE — DISCHARGE INSTRUCTIONS
HOME CARE INSTRUCTIONS    ACTIVITY: Rest and take it easy for the first 24 hours.  A responsible adult is recommended to remain with you during that time.  It is normal to feel sleepy.  We encourage you to not do anything that requires balance, judgment or coordination.    FOR 24 HOURS DO NOT:  Drive, operate machinery or run household appliances.  Drink beer or alcoholic beverages.  Make important decisions or sign legal documents.    DIET: To avoid nausea, slowly advance diet as tolerated, avoiding spicy or greasy foods for the first day.  Add more substantial food to your diet according to your physician's instructions.  Babies can be fed formula or breast milk as soon as they are hungry.  INCREASE FLUIDS AND FIBER TO AVOID CONSTIPATION.    MEDICATIONS: Resume taking daily medication.  Take prescribed pain medication with food.  If no medication is prescribed, you may take non-aspirin pain medication if needed.  PAIN MEDICATION CAN BE VERY CONSTIPATING.  Take a stool softener or laxative such as senokot, pericolace, or milk of magnesia if needed.    Prescription given for OXYCODONE.  CAN HAVE PAIN MEDICATION ANYTIME.    A follow-up appointment should be arranged with your doctor in 2 WEEKS; call to schedule.    You should CALL YOUR PHYSICIAN if you develop:  Fever greater than 101 degrees F.  Pain not relieved by medication, or persistent nausea or vomiting.  Excessive bleeding (blood soaking through dressing) or unexpected drainage from the wound.  Extreme redness or swelling around the incision site, drainage of pus or foul smelling drainage.  Inability to urinate or empty your bladder within 8 hours.  Problems with breathing or chest pain.    You should call 911 if you develop problems with breathing or chest pain.  If you are unable to contact your doctor or surgical center, you should go to the nearest emergency room or urgent care center.  Physician's telephone #: [phone]    MILD FLU-LIKE SYMPTOMS ARE  NORMAL.  YOU MAY EXPERIENCE GENERALIZED MUSCLE ACHES, THROAT IRRITATION, HEADACHE AND/OR SOME NAUSEA.    If any questions arise, call your doctor.  If your doctor is not available, please feel free to call the Surgical Center at [phone].  The Center is open Monday through Friday from 7AM to 7PM.      A registered nurse may call you a few days after your surgery to see how you are doing after your procedure.    You may also receive a survey in the mail within the next two weeks and we ask that you take a few moments to complete the survey and return it to us.  Our goal is to provide you with very good care and we value your comments.     Depression / Suicide Risk    As you are discharged from this [facility] facility, it is important to learn how to keep safe from harming yourself.    Recognize the warning signs:  Abrupt changes in personality, positive or negative- including increase in energy   Giving away possessions  Change in eating patterns- significant weight changes-  positive or negative  Change in sleeping patterns- unable to sleep or sleeping all the time   Unwillingness or inability to communicate  Depression  Unusual sadness, discouragement and loneliness  Talk of wanting to die  Neglect of personal appearance   Rebelliousness- reckless behavior  Withdrawal from people/activities they love  Confusion- inability to concentrate     If you or a loved one observes any of these behaviors or has concerns about self-harm, here's what you can do:  Talk about it- your feelings and reasons for harming yourself  Remove any means that you might use to hurt yourself (examples: pills, rope, extension cords, firearm)  Get professional help from the community (Mental Health, Substance Abuse, psychological counseling)  Do not be alone:Call your Safe Contact- someone whom you trust who will be there for you.  Call your local CRISIS HOTLINE 363-5915 or 052-710-0001  Call your local Children's Mobile Crisis  Response Team [facility] (590) 205-5590 or www.[employer]  Call the toll free National Suicide Prevention Hotlines   National Suicide Prevention Lifeline 576-310-NOKC (1132)  [place] Hope Line Network 800-SUICIDE (392-0760)    I acknowledge receipt and understanding of these Home Care instructions.

## 2022-08-13 NOTE — ANESTHESIA POSTPROCEDURE EVALUATION
Patient: Natasha Pino    Procedure Summary     Date: 08/12/22 Room / Location: [facility] ROOM 25 / SURGERY SAME DAY [facility]    Anesthesia Start: 1500 Anesthesia Stop: 1606    Procedures:       LAPAROSCOPY (Abdomen)      SALPINGECTOMY (Bilateral: Abdomen) Diagnosis: (DESIRES STERILITY)    Surgeons: Car Munguia M.D. Responsible Provider: Shari Garland M.D.    Anesthesia Type: general ASA Status: 2          Final Anesthesia Type: general  Last vitals  BP   Blood Pressure: 129/74    Temp   36.1 °C (97 °F)    Pulse   73   Resp   14    SpO2   92 %      Anesthesia Post Evaluation    Patient location during evaluation: PACU  Patient participation: complete - patient participated  Level of consciousness: awake and alert  Pain score: 0    Airway patency: patent  Anesthetic complications: no  Cardiovascular status: hemodynamically stable  Respiratory status: acceptable  Hydration status: euvolemic    PONV: none          No notable events documented.     Nurse Pain Score: 0 (NPRS)

## 2022-08-31 ENCOUNTER — APPOINTMENT (OUTPATIENT)
Dept: RADIOLOGY | Facility: MEDICAL CENTER | Age: 32
End: 2022-08-31
Attending: EMERGENCY MEDICINE
Payer: COMMERCIAL

## 2022-08-31 ENCOUNTER — HOSPITAL ENCOUNTER (EMERGENCY)
Facility: MEDICAL CENTER | Age: 32
End: 2022-08-31
Attending: EMERGENCY MEDICINE
Payer: COMMERCIAL

## 2022-08-31 VITALS
RESPIRATION RATE: 20 BRPM | DIASTOLIC BLOOD PRESSURE: 70 MMHG | HEIGHT: 64 IN | SYSTOLIC BLOOD PRESSURE: 133 MMHG | TEMPERATURE: 99 F | WEIGHT: 201.28 LBS | OXYGEN SATURATION: 94 % | HEART RATE: 79 BPM | BODY MASS INDEX: 34.36 KG/M2

## 2022-08-31 DIAGNOSIS — R10.9 ABDOMINAL PAIN, UNSPECIFIED ABDOMINAL LOCATION: ICD-10-CM

## 2022-08-31 DIAGNOSIS — G89.18 POSTOPERATIVE PAIN: ICD-10-CM

## 2022-08-31 LAB
ALBUMIN SERPL BCP-MCNC: 4.3 G/DL (ref 3.2–4.9)
ALBUMIN/GLOB SERPL: 1.7 G/DL
ALP SERPL-CCNC: 62 U/L (ref 30–99)
ALT SERPL-CCNC: 26 U/L (ref 2–50)
ANION GAP SERPL CALC-SCNC: 11 MMOL/L (ref 7–16)
APPEARANCE UR: CLEAR
AST SERPL-CCNC: 19 U/L (ref 12–45)
BACTERIA GENITAL QL WET PREP: NORMAL
BASOPHILS # BLD AUTO: 0.4 % (ref 0–1.8)
BASOPHILS # BLD: 0.03 K/UL (ref 0–0.12)
BILIRUB SERPL-MCNC: 0.4 MG/DL (ref 0.1–1.5)
BILIRUB UR QL STRIP.AUTO: NEGATIVE
BUN SERPL-MCNC: 10 MG/DL (ref 8–22)
C TRACH DNA GENITAL QL NAA+PROBE: NEGATIVE
CALCIUM SERPL-MCNC: 9.3 MG/DL (ref 8.5–10.5)
CHLORIDE SERPL-SCNC: 104 MMOL/L (ref 96–112)
CO2 SERPL-SCNC: 22 MMOL/L (ref 20–33)
COLOR UR: YELLOW
CREAT SERPL-MCNC: 0.7 MG/DL (ref 0.5–1.4)
EOSINOPHIL # BLD AUTO: 0.09 K/UL (ref 0–0.51)
EOSINOPHIL NFR BLD: 1.1 % (ref 0–6.9)
ERYTHROCYTE [DISTWIDTH] IN BLOOD BY AUTOMATED COUNT: 41.5 FL (ref 35.9–50)
GFR SERPLBLD CREATININE-BSD FMLA CKD-EPI: 117 ML/MIN/1.73 M 2
GLOBULIN SER CALC-MCNC: 2.5 G/DL (ref 1.9–3.5)
GLUCOSE SERPL-MCNC: 74 MG/DL (ref 65–99)
GLUCOSE UR STRIP.AUTO-MCNC: NEGATIVE MG/DL
HCG SERPL QL: NEGATIVE
HCT VFR BLD AUTO: 42 % (ref 37–47)
HGB BLD-MCNC: 14.6 G/DL (ref 12–16)
IMM GRANULOCYTES # BLD AUTO: 0.03 K/UL (ref 0–0.11)
IMM GRANULOCYTES NFR BLD AUTO: 0.4 % (ref 0–0.9)
KETONES UR STRIP.AUTO-MCNC: NEGATIVE MG/DL
LEUKOCYTE ESTERASE UR QL STRIP.AUTO: NEGATIVE
LIPASE SERPL-CCNC: 36 U/L (ref 11–82)
LYMPHOCYTES # BLD AUTO: 1.46 K/UL (ref 1–4.8)
LYMPHOCYTES NFR BLD: 18.3 % (ref 22–41)
MCH RBC QN AUTO: 31.1 PG (ref 27–33)
MCHC RBC AUTO-ENTMCNC: 34.8 G/DL (ref 33.6–35)
MCV RBC AUTO: 89.4 FL (ref 81.4–97.8)
MICRO URNS: NORMAL
MONOCYTES # BLD AUTO: 0.69 K/UL (ref 0–0.85)
MONOCYTES NFR BLD AUTO: 8.6 % (ref 0–13.4)
N GONORRHOEA DNA GENITAL QL NAA+PROBE: NEGATIVE
NEUTROPHILS # BLD AUTO: 5.69 K/UL (ref 2–7.15)
NEUTROPHILS NFR BLD: 71.2 % (ref 44–72)
NITRITE UR QL STRIP.AUTO: NEGATIVE
NRBC # BLD AUTO: 0 K/UL
NRBC BLD-RTO: 0 /100 WBC
PH UR STRIP.AUTO: 5.5 [PH] (ref 5–8)
PLATELET # BLD AUTO: 331 K/UL (ref 164–446)
PMV BLD AUTO: 9.4 FL (ref 9–12.9)
POTASSIUM SERPL-SCNC: 3.7 MMOL/L (ref 3.6–5.5)
PROT SERPL-MCNC: 6.8 G/DL (ref 6–8.2)
PROT UR QL STRIP: NEGATIVE MG/DL
RBC # BLD AUTO: 4.7 M/UL (ref 4.2–5.4)
RBC UR QL AUTO: NEGATIVE
SIGNIFICANT IND 70042: NORMAL
SITE SITE: NORMAL
SODIUM SERPL-SCNC: 137 MMOL/L (ref 135–145)
SOURCE SOURCE: NORMAL
SP GR UR STRIP.AUTO: 1.01
SPECIMEN SOURCE: NORMAL
UROBILINOGEN UR STRIP.AUTO-MCNC: 0.2 MG/DL
WBC # BLD AUTO: 8 K/UL (ref 4.8–10.8)

## 2022-08-31 PROCEDURE — U0005 INFEC AGEN DETEC AMPLI PROBE: HCPCS

## 2022-08-31 PROCEDURE — 74177 CT ABD & PELVIS W/CONTRAST: CPT

## 2022-08-31 PROCEDURE — 96375 TX/PRO/DX INJ NEW DRUG ADDON: CPT

## 2022-08-31 PROCEDURE — 96374 THER/PROPH/DIAG INJ IV PUSH: CPT

## 2022-08-31 PROCEDURE — 81003 URINALYSIS AUTO W/O SCOPE: CPT

## 2022-08-31 PROCEDURE — 85025 COMPLETE CBC W/AUTO DIFF WBC: CPT

## 2022-08-31 PROCEDURE — 99285 EMERGENCY DEPT VISIT HI MDM: CPT

## 2022-08-31 PROCEDURE — 80053 COMPREHEN METABOLIC PANEL: CPT

## 2022-08-31 PROCEDURE — 83690 ASSAY OF LIPASE: CPT

## 2022-08-31 PROCEDURE — 700105 HCHG RX REV CODE 258: Performed by: EMERGENCY MEDICINE

## 2022-08-31 PROCEDURE — 87491 CHLMYD TRACH DNA AMP PROBE: CPT

## 2022-08-31 PROCEDURE — U0003 INFECTIOUS AGENT DETECTION BY NUCLEIC ACID (DNA OR RNA); SEVERE ACUTE RESPIRATORY SYNDROME CORONAVIRUS 2 (SARS-COV-2) (CORONAVIRUS DISEASE [COVID-19]), AMPLIFIED PROBE TECHNIQUE, MAKING USE OF HIGH THROUGHPUT TECHNOLOGIES AS DESCRIBED BY CMS-2020-01-R: HCPCS

## 2022-08-31 PROCEDURE — 700111 HCHG RX REV CODE 636 W/ 250 OVERRIDE (IP): Performed by: EMERGENCY MEDICINE

## 2022-08-31 PROCEDURE — 76856 US EXAM PELVIC COMPLETE: CPT

## 2022-08-31 PROCEDURE — 84703 CHORIONIC GONADOTROPIN ASSAY: CPT

## 2022-08-31 PROCEDURE — 87591 N.GONORRHOEAE DNA AMP PROB: CPT

## 2022-08-31 PROCEDURE — 700117 HCHG RX CONTRAST REV CODE 255: Performed by: EMERGENCY MEDICINE

## 2022-08-31 PROCEDURE — 36415 COLL VENOUS BLD VENIPUNCTURE: CPT

## 2022-08-31 RX ORDER — SODIUM CHLORIDE 9 MG/ML
1000 INJECTION, SOLUTION INTRAVENOUS ONCE
Status: COMPLETED | OUTPATIENT
Start: 2022-08-31 | End: 2022-08-31

## 2022-08-31 RX ORDER — ONDANSETRON 2 MG/ML
4 INJECTION INTRAMUSCULAR; INTRAVENOUS ONCE
Status: COMPLETED | OUTPATIENT
Start: 2022-08-31 | End: 2022-08-31

## 2022-08-31 RX ORDER — AMOXICILLIN AND CLAVULANATE POTASSIUM 875; 125 MG/1; MG/1
1 TABLET, FILM COATED ORAL 2 TIMES DAILY
Status: SHIPPED | COMMUNITY
End: 2023-06-23

## 2022-08-31 RX ORDER — ONDANSETRON 4 MG/1
4 TABLET, ORALLY DISINTEGRATING ORAL EVERY 8 HOURS PRN
Qty: 6 TABLET | Refills: 0 | Status: SHIPPED | OUTPATIENT
Start: 2022-08-31 | End: 2022-09-02

## 2022-08-31 RX ORDER — AMOXICILLIN 500 MG/1
CAPSULE ORAL 3 TIMES DAILY
Status: SHIPPED | COMMUNITY
End: 2022-08-31

## 2022-08-31 RX ORDER — KETOROLAC TROMETHAMINE 30 MG/ML
15 INJECTION, SOLUTION INTRAMUSCULAR; INTRAVENOUS ONCE
Status: COMPLETED | OUTPATIENT
Start: 2022-08-31 | End: 2022-08-31

## 2022-08-31 RX ADMIN — ONDANSETRON 4 MG: 2 INJECTION INTRAMUSCULAR; INTRAVENOUS at 13:44

## 2022-08-31 RX ADMIN — SODIUM CHLORIDE 1000 ML: 9 INJECTION, SOLUTION INTRAVENOUS at 13:59

## 2022-08-31 RX ADMIN — KETOROLAC TROMETHAMINE 15 MG: 30 INJECTION, SOLUTION INTRAMUSCULAR; INTRAVENOUS at 18:01

## 2022-08-31 RX ADMIN — IOHEXOL 100 ML: 350 INJECTION, SOLUTION INTRAVENOUS at 16:37

## 2022-08-31 ASSESSMENT — FIBROSIS 4 INDEX: FIB4 SCORE: 0.41

## 2022-08-31 NOTE — ED TRIAGE NOTES
Pt to triage .  Chief Complaint   Patient presents with    Post-Op Pain     Hst of LAPAROSCOPY SALPINGECTOMY on 8/12 with a secondary UTI pt has been treated but continues to have abd pain with radiation to right flank. Pt sent by md to r/o pyelonephritis     UTI    Flank Pain     Right

## 2022-08-31 NOTE — ED PROVIDER NOTES
"ED Provider Note    CHIEF COMPLAINT  Chief Complaint   Patient presents with    Post-Op Pain     Hst of LAPAROSCOPY SALPINGECTOMY on 8/12 with a secondary UTI pt has been treated but continues to have abd pain with radiation to right flank. Pt sent by md to r/o pyelonephritis     UTI    Flank Pain     Right        HPI  Natasha Pino is a 32 y.o. female who presents bilateral lower abdominal pain (right greater than left) with right sided flank pain onset 7 days ago, worse over last 5 days. The flank pain started last night. Abdominal pain started 7 days ago and has waxed and waned in intensity over last 5 days. Patient has a history of bilateral laparoscopic salpingectomy on 8/12 with Dr. Munguia. Patient developed symptoms of suprapubic abdominal pain and dysuria 2 weeks and was prescribed antibiotics over teledoc. She followed with Dr. Munguia last week and was prescribed additional antibiotics for her UTI. Patient has continuing UTI symptoms with waxing and waning pain and last night she developed new right sided back pain that has been constant. Her back pain is exacerbated with movement. She has associated nausea, vomiting, diarrhea, congestion, and vaginal discharge 3 days ago. Denies fever, cough, runny nose, or sore throat. She had a negative home COVID test today.       REVIEW OF SYSTEMS  See HPI for further details. All other systems are negative.    PAST MEDICAL HISTORY  Past Medical History:   Diagnosis Date    Acute nasopharyngitis     Mild cold symptoms starting 7/25/22, stuffy nose and cough denies having a fever.  Negative home COVID test 7/28/22.  7/29/22 - states as of today has a dry cough will notify Surgeon if symptoms haven't resolved in the next few days.    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 \"Anxiety attacks\" - no recent problems, no medication.    Seasonal allergies        FAMILY HISTORY  Family History   Problem Relation Age of Onset    Cancer Mother         ovarian CA  and cervical CA (in her mid 30s)    Hyperlipidemia Father     Heart Disease Father         MI x2    Seizures Sister     GI Disease Sister         \"intestinal problem\"    Diabetes Maternal Grandmother     Cancer Maternal Grandmother         breast       SOCIAL HISTORY  Social History     Socioeconomic History    Marital status:     Highest education level: Some college, no degree   Tobacco Use    Smoking status: Every Day     Types: Cigarettes    Smokeless tobacco: Never    Tobacco comments:     A pack lasts about a month.   Vaping Use    Vaping Use: Former   Substance and Sexual Activity    Alcohol use: Yes     Comment: Socially    Drug use: Not Currently    Sexual activity: Yes     Partners: Male     Comment:      Social Determinants of Health     Financial Resource Strain: Low Risk     Difficulty of Paying Living Expenses: Not very hard   Food Insecurity: No Food Insecurity    Worried About Running Out of Food in the Last Year: Never true    Ran Out of Food in the Last Year: Never true   Transportation Needs: No Transportation Needs    Lack of Transportation (Medical): No    Lack of Transportation (Non-Medical): No   Physical Activity: Insufficiently Active    Days of Exercise per Week: 3 days    Minutes of Exercise per Session: 20 min   Stress: Stress Concern Present    Feeling of Stress : To some extent   Social Connections: Moderately Isolated    Frequency of Communication with Friends and Family: More than three times a week    Frequency of Social Gatherings with Friends and Family: Once a week    Attends Restorationist Services: Never    Active Member of " "Clubs or Organizations: No    Attends Club or Organization Meetings: Patient refused    Marital Status:    Housing Stability: High Risk    Unable to Pay for Housing in the Last Year: Yes    Number of Places Lived in the Last Year: 2    Unstable Housing in the Last Year: No       SURGICAL HISTORY  Past Surgical History:   Procedure Laterality Date    OR LAP,DIAGNOSTIC ABDOMEN N/A 8/12/2022    Procedure: LAPAROSCOPY;  Surgeon: Cra Munguia M.D.;  Location: SURGERY SAME DAY [facility];  Service: Gynecology    SALPINGECTOMY Bilateral 8/12/2022    Procedure: SALPINGECTOMY;  Surgeon: Car Munguia M.D.;  Location: SURGERY SAME DAY [facility];  Service: Gynecology    APPENDECTOMY  2016    LEEP  01/01/2011       CURRENT MEDICATIONS  Home Medications       Reviewed by Yudith Chase R.N. (Registered Nurse) on 08/31/22 at 1109  Med List Status: <None>     Medication Last Dose Status   Multiple Vitamin (MULTIVITAMIN ADULT PO)  Active                    ALLERGIES  Allergies   Allergen Reactions    Keppra [Levetiracetam] Unspecified     Severe Depression suicidal thoughts    Mushroom Extract Complex Anaphylaxis     Pt reports her throat swells       PHYSICAL EXAM  VITAL SIGNS: /84   Pulse 86   Temp 37.1 °C (98.7 °F) (Temporal)   Resp 15   Ht 1.626 m (5' 4\")   Wt 91.3 kg (201 lb 4.5 oz)   LMP 07/20/2022   SpO2 97%   BMI 34.55 kg/m²      Constitutional: Well developed, well nourished; No acute distress; Non-toxic appearance.   HENT: Normocephalic, atraumatic; Bilateral external ears normal; Oropharynx with slightly dry mucous membranes; No erythema or exudates in the posterior oropharynx.   Eyes: PERRL, EOMI, Conjunctiva normal. No discharge.   Neck:  Supple, nontender midline; No stridor; No nuchal rigidity.   Lymphatic: No cervical lymphadenopathy noted.   Cardiovascular: Regular rate and rhythm without murmurs, rubs, or gallop.   Thorax & Lungs: No respiratory distress, breath sounds clear to " auscultation bilaterally without wheezing, rales or rhonchi. Nontender chest wall. No crepitus or subcutaneous air  Abdomen: Soft, Elevated BMI, Tender diffusely to percussion, Tender to palpation of right upper quadrant and right lower quadrant, Mildly tender suprapubic and left lower quadrant ,bowel sounds normal. No obvious masses; No pulsatile masses; no rebound, guarding, or peritoneal signs.   Skin: Good color; warm and dry without rash or petechia.  Back: Nontender, Right CVA tenderness  Genitalia: External genitalia appear normal, No masses or lesions. White, thin vaginal discharge; no blood; mild adnexal tenderness; maximal tenderness RLQ of abdomen on pelvic exam  Extremities: Distal radial, dorsalis pedis, posterior tibial pulses are equal bilaterally; No edema; Nontender calves or saphenous, No cyanosis, No clubbing.   Musculoskeletal: Good range of motion in all major joints. No tenderness to palpation or major deformities noted.   Neurologic: Alert & oriented x 4, clear speech    DIAGNOSTIC STUDIES / PROCEDURES     LABS  Results for orders placed or performed during the hospital encounter of 08/31/22   CBC WITH DIFFERENTIAL   Result Value Ref Range    WBC 8.0 4.8 - 10.8 K/uL    RBC 4.70 4.20 - 5.40 M/uL    Hemoglobin 14.6 12.0 - 16.0 g/dL    Hematocrit 42.0 37.0 - 47.0 %    MCV 89.4 81.4 - 97.8 fL    MCH 31.1 27.0 - 33.0 pg    MCHC 34.8 33.6 - 35.0 g/dL    RDW 41.5 35.9 - 50.0 fL    Platelet Count 331 164 - 446 K/uL    MPV 9.4 9.0 - 12.9 fL    Neutrophils-Polys 71.20 44.00 - 72.00 %    Lymphocytes 18.30 (L) 22.00 - 41.00 %    Monocytes 8.60 0.00 - 13.40 %    Eosinophils 1.10 0.00 - 6.90 %    Basophils 0.40 0.00 - 1.80 %    Immature Granulocytes 0.40 0.00 - 0.90 %    Nucleated RBC 0.00 /100 WBC    Neutrophils (Absolute) 5.69 2.00 - 7.15 K/uL    Lymphs (Absolute) 1.46 1.00 - 4.80 K/uL    Monos (Absolute) 0.69 0.00 - 0.85 K/uL    Eos (Absolute) 0.09 0.00 - 0.51 K/uL    Baso (Absolute) 0.03 0.00 - 0.12  K/uL    Immature Granulocytes (abs) 0.03 0.00 - 0.11 K/uL    NRBC (Absolute) 0.00 K/uL   COMP METABOLIC PANEL   Result Value Ref Range    Sodium 137 135 - 145 mmol/L    Potassium 3.7 3.6 - 5.5 mmol/L    Chloride 104 96 - 112 mmol/L    Co2 22 20 - 33 mmol/L    Anion Gap 11.0 7.0 - 16.0    Glucose 74 65 - 99 mg/dL    Bun 10 8 - 22 mg/dL    Creatinine 0.70 0.50 - 1.40 mg/dL    Calcium 9.3 8.5 - 10.5 mg/dL    AST(SGOT) 19 12 - 45 U/L    ALT(SGPT) 26 2 - 50 U/L    Alkaline Phosphatase 62 30 - 99 U/L    Total Bilirubin 0.4 0.1 - 1.5 mg/dL    Albumin 4.3 3.2 - 4.9 g/dL    Total Protein 6.8 6.0 - 8.2 g/dL    Globulin 2.5 1.9 - 3.5 g/dL    A-G Ratio 1.7 g/dL   LIPASE   Result Value Ref Range    Lipase 36 11 - 82 U/L   HCG QUAL SERUM   Result Value Ref Range    Beta-Hcg Qualitative Serum Negative Negative   URINALYSIS,CULTURE IF INDICATED    Specimen: Urine   Result Value Ref Range    Color Yellow     Character Clear     Specific Gravity 1.009 <1.035    Ph 5.5 5.0 - 8.0    Glucose Negative Negative mg/dL    Ketones Negative Negative mg/dL    Protein Negative Negative mg/dL    Bilirubin Negative Negative    Urobilinogen, Urine 0.2 Negative    Nitrite Negative Negative    Leukocyte Esterase Negative Negative    Occult Blood Negative Negative    Micro Urine Req see below    ESTIMATED GFR   Result Value Ref Range    GFR (CKD-EPI) 117 >60 mL/min/1.73 m 2   SARS-CoV-2, PCR (24 Hour In-House) Collect NP swab in VTM    Specimen: Respirate   Result Value Ref Range    SARS-CoV-2 Source NP Swab    WET PREP    Specimen: Vaginal; Genital   Result Value Ref Range    Significant Indicator NEG     Source GEN     Site VAGINAL     Wet Prep For Parasites       No yeast.  No motile Trichomonas seen.  No clue cells seen.     Chlamydia/GC, PCR (Genital/Anal swab)   Result Value Ref Range    Source Endo/Cervical       All labs reviewed by me.     RADIOLOGY  US-PELVIC COMPLETE (TRANSABDOMINAL/TRANSVAGINAL) (COMBO)   Final Result      1. Anechoic  collection in the right adnexa may be related to recent surgery/a postoperative seroma or represent a paraovarian cyst.      2. Small amount of nonspecific free pelvic fluid      CT-ABDOMEN-PELVIS WITH   Final Result      Small amount of nonspecific free pelvic fluid. Exam is otherwise unremarkable          The radiologist’s interpretation of all radiology studies have been reviewed by me.       COURSE & MEDICAL DECISION MAKING  Pertinent Labs & Imaging studies reviewed. (See chart for details)    1:07 PM - Patient seen and examined at bedside. Discussed plan of care, including labs, radiology, and COVID testing. Patient agrees to the plan of care. The patient will be resuscitated with 1L NS IV and medicated with zofran 4 mg. Ordered for CT abdomen pelvis, CBC w/ diff, CMP, lipase, HCG qual, and UA to evaluate her symptoms.     1745: Discussed with Dr. Jaimes who recommended getting pelvic US.  She said it sometimes you can have a hematoma on the pedicle.  Sometimes bladder injury can occur with laparoscopic procedures, but she thinks patient would have some sort of abnormality on urinalysis or abnormality seen on CT scan if this was the case.  If pelvic US negative, labs normal, CT normal, and urine clear, OK to discharge home to follow up with Dr. Munguia.    1855: Paged Dr. Jaimes again    1900: Dr. Jaimes and I discussed results of the US. She advises to reassure patient and have her follow up with Dr. Munguia or Dr. Donis in next 1-2 days. Patient to call for appointment.    Patient presents to the ER complaining of dysuria for the last several weeks.  She has been on now 2 rounds of antibiotics.  She also complains of lower abdominal pain.  The lower abdominal pain started about a week ago.  Pain waxes and wanes in intensity.  She is had several more rather severe exacerbations of pain over the last several days.  Yesterday she started having pain into the right flank.  She contacted her gynecologist who  asked her to come to the ER for evaluation.  She had a bilateral salpingectomy on August 12.  He saw her in the office a week ago when she started having the lower abdominal pain.  He switched her from nitrofurantoin to Augmentin.  Urinalysis is clear.  No evidence of UTI.  No evidence of hematuria.  She was tender diffusely to percussion, mostly in the right upper quadrant, right mid abdomen and right lower quadrant.  She also was tender in the suprapubic region.  The laparoscopic wounds look good.  No signs of infection.  She had some mild right CVA tenderness.  CT scan of the abdomen pelvis is negative.  No hydronephrosis.  No evidence of ureteral stone.  Again, urine is clear.  No evidence of intra-abdominal bleeding.  She has small amount of free fluid in the pelvis, but this could be physiologic.  This could be secondary to her surgery.  There is no signs of abscess.  Vital signs are normal and stable.  Labs are unremarkable.  White count is normal.  No left shift.  I spoke with Dr. Jaimes, OB/GYN on-call.  She would like me to do a pelvic ultrasound just to be sure there is no hematoma on the pedicle or any sort of strange ovarian pathology that would explain her pain.  As long as CT scan, pelvic ultrasound, labs and UA are clear/negative, Dr. Jaimes feels patient can be discharged to follow-up with Dr. Munguia in the office.  US is negative except for a postoperative seroma vs paraovarian cyst.  Disc with Dr Jaimes again who said to reassure patient and have her follow up with Dr. Munguia or Dr. Donis in the office in the next 1-2 days.     FINAL IMPRESSION  1. Postoperative pain Acute   2. Abdominal pain, unspecified abdominal location Acute       This dictation has been created using voice recognition software. The accuracy of the dictation is limited by the abilities of the software. I expect there may be some errors of grammar and possibly content. I made every attempt to manually correct the errors  within my dictation. However, errors related to voice recognition software may still exist and should be interpreted within the appropriate context.     Electronically signed by: Callie Wallace M.D., 8/31/2022 12:04 PM

## 2022-09-01 LAB
SARS-COV-2 RNA RESP QL NAA+PROBE: NOTDETECTED
SPECIMEN SOURCE: NORMAL

## 2022-09-01 NOTE — DISCHARGE INSTRUCTIONS
Follow up with Dr. Munguia or Dr. Donis tomorrow.  Please call for appointment.    Return to ER for worsening pain, changing pain, fever over 100.4, shaking chills, nausea, vomiting, worsening pain with urination, cloudy or foul smelling urine, blood in urine, or for any concerns.     Take over the counter tylenol and advil for pain.

## 2023-03-31 ENCOUNTER — APPOINTMENT (OUTPATIENT)
Dept: MEDICAL GROUP | Facility: PHYSICIAN GROUP | Age: 33
End: 2023-03-31
Payer: COMMERCIAL

## 2023-04-08 ENCOUNTER — OFFICE VISIT (OUTPATIENT)
Dept: URGENT CARE | Facility: CLINIC | Age: 33
End: 2023-04-08
Payer: COMMERCIAL

## 2023-04-08 VITALS
BODY MASS INDEX: 34.13 KG/M2 | RESPIRATION RATE: 14 BRPM | HEART RATE: 88 BPM | HEIGHT: 64 IN | WEIGHT: 199.9 LBS | TEMPERATURE: 98 F | OXYGEN SATURATION: 98 % | SYSTOLIC BLOOD PRESSURE: 110 MMHG | DIASTOLIC BLOOD PRESSURE: 80 MMHG

## 2023-04-08 DIAGNOSIS — R13.10 ODYNOPHAGIA: ICD-10-CM

## 2023-04-08 DIAGNOSIS — J06.9 VIRAL URI WITH COUGH: ICD-10-CM

## 2023-04-08 DIAGNOSIS — J02.9 PHARYNGITIS, UNSPECIFIED ETIOLOGY: ICD-10-CM

## 2023-04-08 LAB
FLUAV RNA SPEC QL NAA+PROBE: NEGATIVE
FLUBV RNA SPEC QL NAA+PROBE: NEGATIVE
RSV RNA SPEC QL NAA+PROBE: NEGATIVE
S PYO DNA SPEC NAA+PROBE: NOT DETECTED
SARS-COV-2 RNA RESP QL NAA+PROBE: NEGATIVE

## 2023-04-08 PROCEDURE — 99213 OFFICE O/P EST LOW 20 MIN: CPT | Performed by: FAMILY MEDICINE

## 2023-04-08 PROCEDURE — 0241U POCT CEPHEID COV-2, FLU A/B, RSV - PCR: CPT | Performed by: FAMILY MEDICINE

## 2023-04-08 PROCEDURE — 87651 STREP A DNA AMP PROBE: CPT | Performed by: FAMILY MEDICINE

## 2023-04-08 RX ORDER — NAPROXEN 500 MG/1
500 TABLET ORAL 2 TIMES DAILY WITH MEALS
Qty: 20 TABLET | Refills: 0 | Status: SHIPPED | OUTPATIENT
Start: 2023-04-08 | End: 2023-04-18

## 2023-04-08 ASSESSMENT — ENCOUNTER SYMPTOMS
SORE THROAT: 0
VOMITING: 0
NAUSEA: 0
FEVER: 0
CHILLS: 0
SHORTNESS OF BREATH: 0
COUGH: 1
MYALGIAS: 1

## 2023-04-08 ASSESSMENT — FIBROSIS 4 INDEX: FIB4 SCORE: 0.37

## 2023-04-08 NOTE — PROGRESS NOTES
Subjective:   Natasha Pino is a 32 y.o. female who presents for Pharyngitis (Pt has a sore throat, Trouble breathing from nose, fever, muscle aches x yesterday )        Pharyngitis   This is a new (Reports sore throat, pain with swallowing, fevers, muscle aches onset yesterday) problem. The current episode started yesterday. The problem has been unchanged. Associated symptoms include congestion and coughing. Pertinent negatives include no shortness of breath or vomiting. Associated symptoms comments: There has been community-wide COVID-19 exposure, the patient denies known direct COVID-19 exposure    . Treatments tried: Rest, fluids. The treatment provided no relief.   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  MEDS:   Current Outpatient Medications:     Ibuprofen (MOTRIN PO), Take  by mouth., Disp: , Rfl:     naproxen (NAPROSYN) 500 MG Tab, Take 1 Tablet by mouth 2 times a day with meals for 10 days., Disp: 20 Tablet, Rfl: 0    Multiple Vitamin (MULTIVITAMIN ADULT PO), Take  by mouth every day., Disp: , Rfl:     amoxicillin-clavulanate (AUGMENTIN) 875-125 MG Tab, Take 1 Tablet by mouth 2 times a day. (Patient not taking: Reported on 4/8/2023), Disp: , Rfl:   ALLERGIES:   Allergies   Allergen Reactions    Keppra [Levetiracetam] Unspecified     Severe Depression suicidal thoughts    Mushroom Extract Complex Anaphylaxis     Pt reports her throat swells     SURGHX:   Past Surgical History:   Procedure Laterality Date    [place] LAP,DIAGNOSTIC ABDOMEN N/A 8/12/2022    Procedure: LAPAROSCOPY;  Surgeon: Car Munguia M.D.;  Location: SURGERY SAME DAY [facility];  Service: Gynecology    SALPINGECTOMY Bilateral 8/12/2022     "Procedure: SALPINGECTOMY;  Surgeon: Car Munguia M.D.;  Location: SURGERY SAME DAY [facility];  Service: Gynecology    APPENDECTOMY  2016    [facility]  01/01/2011     SOCHX:  reports that she has quit smoking. Her smoking use included cigarettes. She has never used smokeless tobacco. She reports current alcohol use. She reports that she does not currently use drugs.  FH:   Family History   Problem Relation Age of Onset    Cancer Mother         ovarian CA  and cervical CA (in her mid 30s)    Hyperlipidemia Father     Heart Disease Father         MI x2    Seizures Sister     GI Disease Sister         \"intestinal problem\"    Diabetes Maternal Grandmother     Cancer Maternal Grandmother         breast     Review of Systems   Constitutional:  Negative for chills and fever.   HENT:  Positive for congestion. Negative for sore throat.    Respiratory:  Positive for cough. Negative for shortness of breath.    Gastrointestinal:  Negative for nausea and vomiting.   Musculoskeletal:  Positive for myalgias.   Skin:  Negative for rash.      Objective:   /80 (BP Location: Left arm, Patient Position: Sitting, BP Cuff Size: Adult)   Pulse 88   Temp 36.7 °C (98 °F) (Temporal)   Resp 14   Ht 1.626 m (5' 4\")   Wt 90.7 kg (199 lb 14.4 oz)   SpO2 98%   BMI 34.31 kg/m²   Physical Exam  Vitals and nursing note reviewed.   Constitutional:       General: She is not in acute distress.     Appearance: She is well-developed.   HENT:      Head: Normocephalic and atraumatic.      Right Ear: Tympanic membrane and external ear normal.      Left Ear: Tympanic membrane and external ear normal.      Nose: Rhinorrhea present.      Mouth/Throat:      Mouth: Mucous membranes are moist.      Pharynx: Posterior oropharyngeal erythema present. No oropharyngeal exudate.   Eyes:      Conjunctiva/sclera: Conjunctivae normal.   Cardiovascular:      Rate and Rhythm: Normal rate.   Pulmonary:      Effort: Pulmonary effort is normal. No respiratory " distress.      Breath sounds: Normal breath sounds. No rhonchi or rales.   Abdominal:      General: There is no distension.   Musculoskeletal:         General: Normal range of motion.   Skin:     General: Skin is warm and dry.   Neurological:      General: No focal deficit present.      Mental Status: She is alert and oriented to person, place, and time. Mental status is at baseline.      Gait: Gait (gait at baseline) normal.   Psychiatric:         Judgment: Judgment normal.         Assessment/Plan:   1. Pharyngitis, unspecified etiology  - POCT GROUP A STREP, PCR  - naproxen (NAPROSYN) 500 MG Tab; Take 1 Tablet by mouth 2 times a day with meals for 10 days.  Dispense: 20 Tablet; Refill: 0    2. Viral URI with cough  - POCT CoV-2, Flu A/B, RSV by PCR    3. Odynophagia  - naproxen (NAPROSYN) 500 MG Tab; Take 1 Tablet by mouth 2 times a day with meals for 10 days.  Dispense: 20 Tablet; Refill: 0    Other orders  - Ibuprofen (MOTRIN PO); Take  by mouth.        Medical Decision Making/Course:  In the course of preparing for this visit with review of the pertinent past medical history, recent and past clinic visits, current medications, and performing chart, immunization, medical history and medication reconciliation, and in the further course of obtaining the current history pertinent to the clinic visit today, performing an exam and evaluation, ordering and independently evaluating labs, tests including Influenza A, Influenza B, RSV, and SARS CoV-2 by PCR testing   , and/or procedures, prescribing any recommended new medications as noted above, providing any pertinent counseling and education and recommending further coordination of care, at least  15 minutes of total time were spent during this encounter.      Discussed close monitoring, return precautions, and supportive measures of maintaining adequate fluid hydration and caloric intake, relative rest and symptom management as needed for pain and/or  fever.    Differential diagnosis, natural history, supportive care, and indications for immediate follow-up discussed.     Advised the patient to follow-up with the primary care physician for recheck, reevaluation, and consideration of further management.    Please note that this dictation was created using voice recognition software. I have worked with consultants from the vendor as well as technical experts from [facility] to optimize the interface. I have made every reasonable attempt to correct obvious errors, but I expect that there are errors of grammar and possibly content that I did not discover before finalizing the note.

## 2023-04-21 ENCOUNTER — APPOINTMENT (OUTPATIENT)
Dept: MEDICAL GROUP | Facility: PHYSICIAN GROUP | Age: 33
End: 2023-04-21
Payer: COMMERCIAL

## 2023-06-23 ENCOUNTER — OFFICE VISIT (OUTPATIENT)
Dept: MEDICAL GROUP | Facility: PHYSICIAN GROUP | Age: 33
End: 2023-06-23
Payer: COMMERCIAL

## 2023-06-23 VITALS
SYSTOLIC BLOOD PRESSURE: 114 MMHG | HEIGHT: 64 IN | TEMPERATURE: 97.5 F | DIASTOLIC BLOOD PRESSURE: 72 MMHG | BODY MASS INDEX: 33.8 KG/M2 | WEIGHT: 198 LBS | OXYGEN SATURATION: 97 % | HEART RATE: 94 BPM

## 2023-06-23 DIAGNOSIS — Z86.16 HISTORY OF COVID-19: ICD-10-CM

## 2023-06-23 DIAGNOSIS — Z13.79 GENETIC SCREENING: ICD-10-CM

## 2023-06-23 DIAGNOSIS — E66.9 OBESITY (BMI 30-39.9): ICD-10-CM

## 2023-06-23 DIAGNOSIS — F32.5 MAJOR DEPRESSIVE DISORDER WITH SINGLE EPISODE, IN FULL REMISSION (HCC): ICD-10-CM

## 2023-06-23 DIAGNOSIS — G40.209 LOCALIZATION-RELATED PARTIAL EPILEPSY WITH COMPLEX PARTIAL SEIZURES (HCC): ICD-10-CM

## 2023-06-23 DIAGNOSIS — J34.9 NOSE SYMPTOM: Primary | ICD-10-CM

## 2023-06-23 PROBLEM — M94.0 COSTOCHONDRITIS: Status: RESOLVED | Noted: 2021-11-09 | Resolved: 2023-06-23

## 2023-06-23 PROBLEM — G25.2 COARSE TREMORS: Status: RESOLVED | Noted: 2022-02-16 | Resolved: 2023-06-23

## 2023-06-23 PROBLEM — L65.9 HAIR LOSS: Status: RESOLVED | Noted: 2021-11-09 | Resolved: 2023-06-23

## 2023-06-23 PROBLEM — I82.612 SUPERFICIAL VENOUS THROMBOSIS OF ARM, LEFT: Status: RESOLVED | Noted: 2021-10-26 | Resolved: 2023-06-23

## 2023-06-23 PROCEDURE — 3078F DIAST BP <80 MM HG: CPT | Performed by: FAMILY MEDICINE

## 2023-06-23 PROCEDURE — 99214 OFFICE O/P EST MOD 30 MIN: CPT | Performed by: FAMILY MEDICINE

## 2023-06-23 PROCEDURE — 3074F SYST BP LT 130 MM HG: CPT | Performed by: FAMILY MEDICINE

## 2023-06-23 RX ORDER — VALACYCLOVIR HYDROCHLORIDE 1 G/1
TABLET, FILM COATED ORAL
COMMUNITY
Start: 2023-05-18 | End: 2023-06-23

## 2023-06-23 ASSESSMENT — PATIENT HEALTH QUESTIONNAIRE - PHQ9
6. FEELING BAD ABOUT YOURSELF - OR THAT YOU ARE A FAILURE OR HAVE LET YOURSELF OR YOUR FAMILY DOWN: NOT AL ALL
SUM OF ALL RESPONSES TO PHQ9 QUESTIONS 1 AND 2: 0
3. TROUBLE FALLING OR STAYING ASLEEP OR SLEEPING TOO MUCH: SEVERAL DAYS
8. MOVING OR SPEAKING SO SLOWLY THAT OTHER PEOPLE COULD HAVE NOTICED. OR THE OPPOSITE, BEING SO FIGETY OR RESTLESS THAT YOU HAVE BEEN MOVING AROUND A LOT MORE THAN USUAL: NOT AT ALL
5. POOR APPETITE OR OVEREATING: NOT AT ALL
7. TROUBLE CONCENTRATING ON THINGS, SUCH AS READING THE NEWSPAPER OR WATCHING TELEVISION: NOT AT ALL
4. FEELING TIRED OR HAVING LITTLE ENERGY: SEVERAL DAYS
SUM OF ALL RESPONSES TO PHQ QUESTIONS 1-9: 2
2. FEELING DOWN, DEPRESSED, IRRITABLE, OR HOPELESS: NOT AT ALL
9. THOUGHTS THAT YOU WOULD BE BETTER OFF DEAD, OR OF HURTING YOURSELF: NOT AT ALL
CLINICAL INTERPRETATION OF PHQ2 SCORE: 0
1. LITTLE INTEREST OR PLEASURE IN DOING THINGS: NOT AT ALL

## 2023-06-23 ASSESSMENT — ENCOUNTER SYMPTOMS
VOMITING: 0
FEVER: 0
CHILLS: 0
NAUSEA: 0

## 2023-06-23 ASSESSMENT — FIBROSIS 4 INDEX: FIB4 SCORE: 0.37

## 2023-06-23 NOTE — PATIENT INSTRUCTIONS
For your nose:  - I want you to do a sinus rinse twice per day  - After each sinus rinse, you will do 1 spray of flonase per nostril

## 2023-06-23 NOTE — PROGRESS NOTES
"Subjective:     CC: nose symptom    HPI:   Natasha presents today with    Problem   Nose Symptom    In last year  Progressively worsened in last couple of months  Cannot breathe through R nostril  Affecting exercise  Taking anti-histamines - didn't work  Tried Neti-Pot - improves it for ~5 min  Tried saline nasal sprays - felt less crusty in her nose       History of Covid-19    7/2021 2/2022    She has a decreased sense of smell since her infection. She also notes some altered tasted.      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 Not on any medication, none since 2018. No seizures since 2018. She does follow with neurology      Obesity (Bmi 30-39.9)    This is a chronic condition.  Max weight: 228 lbs (2/2023)  Current weight: 198 lbs  Weight change: - 1 lbs since 4/2023  BMI: 33.99  Diet: trying to eat healthy, portion-sizes, intermittent fasting  Exercise: more regular       Coarse Tremors (Resolved)   Hair Loss (Resolved)    Since had her baby 3 months ago. Noticed some balding over the forehead/temples. Went completely bald in that area but hair is growing back.      Costochondritis (Resolved)   Superficial Venous Thrombosis of Arm, Left (Resolved)    Acute. In 7/2021. Working with vascular medicine. Reports resolving.          Health Maintenance: Completed    ROS:  Review of Systems   Constitutional:  Negative for chills and fever.   Gastrointestinal:  Negative for nausea and vomiting.     Objective:     Exam:  /72 (BP Location: Left arm, Patient Position: Sitting, BP Cuff Size: Adult)   Pulse 94   Temp 36.4 °C (97.5 °F) (Temporal)   Ht 1.626 m (5' 4\")   Wt 89.8 kg (198 lb)   LMP 06/15/2023 (Approximate)   SpO2 97%   BMI 33.99 kg/m²  Body mass index is 33.99 kg/m².    Physical Exam  Constitutional:       Appearance: Normal appearance.   HENT:      Nose: Septal deviation and mucosal edema present.      Left Turbinates: Enlarged.   Cardiovascular:     " " Rate and Rhythm: Normal rate and regular rhythm.      Heart sounds: Normal heart sounds.   Pulmonary:      Effort: Pulmonary effort is normal.      Breath sounds: Normal breath sounds.   Musculoskeletal:      Cervical back: Normal range of motion and neck supple.   Neurological:      Mental Status: She is alert.             Assessment & Plan:     32 y.o. female with the following -     1. Nose symptom  Chronic, stable.  For the last year she has noticed difficulty breathing through her right nostril.  Is progressively worse in the last couple of months and is now affecting her ability to breathe during exercise.  She tried antihistamines for several months with no improvement.  She tried the [place] pot which helps for about 5 minutes.  Over-the-counter saline nasal appraise will also make it feel less \"crusty\" in the nose.  But nothing has provided long-term relief.  Exam does show likely right-sided septal deviation but I would not expect saline rinses or sprays to provide some symptom relief so there may be component of congestion worsening the deviation.  - Sinus rinses twice daily followed by Flonase twice daily for a month  - If no improvement at next visit, refer to ENT    2. History of COVID-19  Chronic, stable.  She has had 2 episodes of COVID-19.  Since she had COVID-19 she has had an altered sense of taste and smell.  We will continue to monitor.    3. 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  She continues to be seizure-free since 2018 and not on medication.  We will monitor yearly.    4. Major depressive disorder with single episode, in full remission (HCC)  Chronic, in remission.  PHQ 2 was 0 today.  We will continue to monitor off medication.    5. Obesity (BMI 30-39.9)  Chronic, improving.  She reports that after her COVID infection last year she weighed 228 pounds.  She has lost 30 pounds since that time working on healthy diet, portion sizes, " intermittent fasting as well as exercising more regularly.  I have encouraged her to continue working on these healthy habits.  - Comp Metabolic Panel; Future    6. Genetic screening  - Referral to Genetic Research Studies    Referral for genetic research was offered. Patient is a participant.    Return in about 4 weeks (around 7/21/2023) for f/u on the congestion.    Please note that this dictation was created using voice recognition software. I have made every reasonable attempt to correct obvious errors, but I expect that there are errors of grammar and possibly content that I did not discover before finalizing the note.

## 2023-07-11 ENCOUNTER — RESEARCH ENCOUNTER (OUTPATIENT)
Dept: RESEARCH | Facility: WORKSITE | Age: 33
End: 2023-07-11
Attending: FAMILY MEDICINE
Payer: COMMERCIAL

## 2023-07-11 NOTE — RESEARCH NOTE
Virtual appointment completed to enroll patient into [facility]. Consent form signed and saliva collection scheduled at [place] 7/17/2023 @ 3:45.

## 2023-07-17 ENCOUNTER — RESEARCH ENCOUNTER (OUTPATIENT)
Dept: RESEARCH | Facility: WORKSITE | Age: 33
End: 2023-07-17
Payer: COMMERCIAL

## 2023-07-17 ENCOUNTER — OFFICE VISIT (OUTPATIENT)
Dept: MEDICAL GROUP | Facility: PHYSICIAN GROUP | Age: 33
End: 2023-07-17
Payer: COMMERCIAL

## 2023-07-17 VITALS
OXYGEN SATURATION: 100 % | WEIGHT: 201 LBS | SYSTOLIC BLOOD PRESSURE: 122 MMHG | BODY MASS INDEX: 34.31 KG/M2 | DIASTOLIC BLOOD PRESSURE: 72 MMHG | TEMPERATURE: 98.5 F | HEART RATE: 67 BPM | RESPIRATION RATE: 16 BRPM | HEIGHT: 64 IN

## 2023-07-17 DIAGNOSIS — J34.9 NOSE SYMPTOM: ICD-10-CM

## 2023-07-17 DIAGNOSIS — N64.59 BREAST SYMPTOM: ICD-10-CM

## 2023-07-17 DIAGNOSIS — Z00.6 RESEARCH STUDY PATIENT: ICD-10-CM

## 2023-07-17 PROCEDURE — 99213 OFFICE O/P EST LOW 20 MIN: CPT | Performed by: FAMILY MEDICINE

## 2023-07-17 PROCEDURE — 3074F SYST BP LT 130 MM HG: CPT | Performed by: FAMILY MEDICINE

## 2023-07-17 PROCEDURE — 3078F DIAST BP <80 MM HG: CPT | Performed by: FAMILY MEDICINE

## 2023-07-17 ASSESSMENT — ENCOUNTER SYMPTOMS
FEVER: 0
CHILLS: 0
SHORTNESS OF BREATH: 0

## 2023-07-17 ASSESSMENT — FIBROSIS 4 INDEX: FIB4 SCORE: 0.38

## 2023-07-17 NOTE — PROGRESS NOTES
"Subjective:     CC: nose symptom    HPI:   Natasha presents today with    Problem   Breast Symptom    Chronic. She has enlarged breasts which causes daily upper back pain. She will also get indentations on her shoulders from the bra which is very uncomfortable. She also notes sores can occur under her breasts from maceration. Currently, DD to DDD size depending on the brand.      Nose Symptom    In last year  Progressively worsened in last couple of months  Cannot breathe through R nostril  Affecting exercise  Taking anti-histamines - didn't work  Tried Neti-Pot - improves it for ~5 min  Tried saline nasal sprays - felt less crusty in her nose    7/17/23 Update  She has been doing twice daily sinus rinses followed by flonase 1 spray per nostril twice daily.  Side effects: cough, 5 blood noses  She did a symptom journal, consistently after exercising, more congested and more snoring that night  Overall, hardly any improvement of the R nasal congestion           Health Maintenance: Completed    ROS:  Review of Systems   Constitutional:  Negative for chills and fever.   Respiratory:  Negative for shortness of breath.    Cardiovascular:  Negative for chest pain.       Objective:     Exam:  /72 (BP Location: Right arm, Patient Position: Sitting, BP Cuff Size: Adult)   Pulse 67   Temp 36.9 °C (98.5 °F) (Temporal)   Resp 16   Ht 1.626 m (5' 4\")   Wt 91.2 kg (201 lb)   LMP 06/15/2023 (Approximate)   SpO2 100%   BMI 34.50 kg/m²  Body mass index is 34.5 kg/m².    Physical Exam  Constitutional:       Appearance: Normal appearance.   Cardiovascular:      Rate and Rhythm: Normal rate and regular rhythm.      Heart sounds: Normal heart sounds.   Pulmonary:      Effort: Pulmonary effort is normal.      Breath sounds: Normal breath sounds.   Musculoskeletal:      Cervical back: Normal range of motion and neck supple.   Neurological:      Mental Status: She is alert.             Assessment & Plan:     33 y.o. female " with the following -     1. Nose symptom  Chronic, stable. For about a year she has had difficulty breathing through her right nostril, especially after exercise. At her last visit there was evidence of right sided nasal septum deviation which could be contributing.  At her last appointment she mentioned that a Neti pot received symptoms for about 5 minutes so we decided to do sinus rinses twice daily followed by Flonase twice daily.  She is been doing that consistently for the last month with hardly any improvement.  Therefore, she likely needs to see ENT to discuss options for treating the deviated septum.  In the meantime, she has noticed some other improvements with that regimen so she is going to continue the sinus rinses and Flonase.  - Referral to ENT    2. Breast symptom  Chronic, stable.  She has enlarged breasts, reports that depending on the brand can wear anywhere from a DD to DDD size bra.  As result she does have associated symptoms-daily upper back pain, discomfort with indentation where her bra strap stick into her shoulders, and sores appearing on the underside of her breasts.  She is interested in a breast reduction surgery so plastic surgery referral has been placed today.  - Referral to Plastic Surgery    Referral for genetic research was offered. Patient accepted previously.    Return in about 1 year (around 7/17/2024) for Annual/wellness visit.    Please note that this dictation was created using voice recognition software. I have made every reasonable attempt to correct obvious errors, but I expect that there are errors of grammar and possibly content that I did not discover before finalizing the note.

## 2023-07-17 NOTE — LETTER
[facility]  Tamara Coffman M.D.  [address]  [place] [address]  Fax: [phone]   Authorization for Release/Disclosure of   Protected Health Information   Name: MERLY WESTON : 1990 SSN: [id number]   Address: [address] [place] [address]  [place] [address] Phone:    [phone] (home)    I authorize the entity listed below to release/disclose the PHI below to:   [facility]/Tamara Coffman M.D. and Tamara Coffman M.D.   Provider or Entity Name:  [facility]   Address   City, State, [facility]   Phone:      Fax:     Reason for request: continuity of care   Information to be released:    [  ] LAST COLONOSCOPY,  including any PATH REPORT and follow-up  [  ] LAST FIT/COLOGUARD RESULT [  ] LAST DEXA  [  ] LAST MAMMOGRAM  [  ] LAST PAP  [  ] LAST LABS [  ] RETINA EXAM REPORT  [  ] IMMUNIZATION RECORDS  [XX] Release all info      [  ] Check here and initial the line next to each item to release ALL health information INCLUDING  _____ Care and treatment for drug and / or alcohol abuse  _____ HIV testing, infection status, or AIDS  _____ Genetic Testing    DATES OF SERVICE OR TIME PERIOD TO BE DISCLOSED: _____________  I understand and acknowledge that:  * This Authorization may be revoked at any time by you in writing, except if your health information has already been used or disclosed.  * Your health information that will be used or disclosed as a result of you signing this authorization could be re-disclosed by the recipient. If this occurs, your re-disclosed health information may no longer be protected by State or Federal laws.  * You may refuse to sign this Authorization. Your refusal will not affect your ability to obtain treatment.  * This Authorization becomes effective upon signing and will  on (date) __________.      If no date is indicated, this Authorization will  one (1) year from the signature date.    Name: Merly Weston  Signature: Date:    7/17/2023     PLEASE FAX REQUESTED RECORDS BACK TO: [phone]

## 2023-07-26 NOTE — ED NOTES
Pt resting on josh   Cheek-To-Nose Interpolation Flap Text: A decision was made to reconstruct the defect utilizing an interpolation axial flap and a staged reconstruction.  A telfa template was made of the defect.  This telfa template was then used to outline the Cheek-To-Nose Interpolation flap.  The donor area for the pedicle flap was then injected with anesthesia.  The flap was excised through the skin and subcutaneous tissue down to the layer of the underlying musculature.  The interpolation flap was carefully excised within this deep plane to maintain its blood supply.  The edges of the donor site were undermined.   The donor site was closed in a primary fashion.  The pedicle was then rotated into position and sutured.  Once the tube was sutured into place, adequate blood supply was confirmed with blanching and refill.  The pedicle was then wrapped with xeroform gauze and dressed appropriately with a telfa and gauze bandage to ensure continued blood supply and protect the attached pedicle.

## 2023-07-30 ENCOUNTER — APPOINTMENT (OUTPATIENT)
Dept: RADIOLOGY | Facility: MEDICAL CENTER | Age: 33
End: 2023-07-30
Attending: EMERGENCY MEDICINE
Payer: COMMERCIAL

## 2023-07-30 ENCOUNTER — HOSPITAL ENCOUNTER (EMERGENCY)
Facility: MEDICAL CENTER | Age: 33
End: 2023-07-30
Attending: EMERGENCY MEDICINE
Payer: COMMERCIAL

## 2023-07-30 VITALS
TEMPERATURE: 98.6 F | WEIGHT: 197 LBS | HEIGHT: 64 IN | HEART RATE: 81 BPM | SYSTOLIC BLOOD PRESSURE: 123 MMHG | BODY MASS INDEX: 33.63 KG/M2 | OXYGEN SATURATION: 96 % | RESPIRATION RATE: 19 BRPM | DIASTOLIC BLOOD PRESSURE: 73 MMHG

## 2023-07-30 DIAGNOSIS — R07.9 CHEST PAIN, UNSPECIFIED TYPE: ICD-10-CM

## 2023-07-30 LAB
ALBUMIN SERPL BCP-MCNC: 4.5 G/DL (ref 3.2–4.9)
ALBUMIN/GLOB SERPL: 1.6 G/DL
ALP SERPL-CCNC: 49 U/L (ref 30–99)
ALT SERPL-CCNC: 23 U/L (ref 2–50)
ANION GAP SERPL CALC-SCNC: 13 MMOL/L (ref 7–16)
AST SERPL-CCNC: 17 U/L (ref 12–45)
BASOPHILS # BLD AUTO: 0.4 % (ref 0–1.8)
BASOPHILS # BLD: 0.04 K/UL (ref 0–0.12)
BILIRUB SERPL-MCNC: 0.3 MG/DL (ref 0.1–1.5)
BUN SERPL-MCNC: 13 MG/DL (ref 8–22)
CALCIUM ALBUM COR SERPL-MCNC: 8.8 MG/DL (ref 8.5–10.5)
CALCIUM SERPL-MCNC: 9.2 MG/DL (ref 8.5–10.5)
CHLORIDE SERPL-SCNC: 104 MMOL/L (ref 96–112)
CO2 SERPL-SCNC: 22 MMOL/L (ref 20–33)
CREAT SERPL-MCNC: 0.89 MG/DL (ref 0.5–1.4)
D DIMER PPP IA.FEU-MCNC: <0.27 UG/ML (FEU) (ref 0–0.5)
EKG IMPRESSION: NORMAL
EOSINOPHIL # BLD AUTO: 0.09 K/UL (ref 0–0.51)
EOSINOPHIL NFR BLD: 1 % (ref 0–6.9)
ERYTHROCYTE [DISTWIDTH] IN BLOOD BY AUTOMATED COUNT: 40.5 FL (ref 35.9–50)
GFR SERPLBLD CREATININE-BSD FMLA CKD-EPI: 87 ML/MIN/1.73 M 2
GLOBULIN SER CALC-MCNC: 2.8 G/DL (ref 1.9–3.5)
GLUCOSE SERPL-MCNC: 92 MG/DL (ref 65–99)
HCG SERPL QL: NEGATIVE
HCT VFR BLD AUTO: 42.9 % (ref 37–47)
HGB BLD-MCNC: 14.6 G/DL (ref 12–16)
IMM GRANULOCYTES # BLD AUTO: 0.02 K/UL (ref 0–0.11)
IMM GRANULOCYTES NFR BLD AUTO: 0.2 % (ref 0–0.9)
LYMPHOCYTES # BLD AUTO: 1.84 K/UL (ref 1–4.8)
LYMPHOCYTES NFR BLD: 20.5 % (ref 22–41)
MCH RBC QN AUTO: 30.9 PG (ref 27–33)
MCHC RBC AUTO-ENTMCNC: 34 G/DL (ref 32.2–35.5)
MCV RBC AUTO: 90.7 FL (ref 81.4–97.8)
MONOCYTES # BLD AUTO: 0.65 K/UL (ref 0–0.85)
MONOCYTES NFR BLD AUTO: 7.2 % (ref 0–13.4)
NEUTROPHILS # BLD AUTO: 6.35 K/UL (ref 1.82–7.42)
NEUTROPHILS NFR BLD: 70.7 % (ref 44–72)
NRBC # BLD AUTO: 0 K/UL
NRBC BLD-RTO: 0 /100 WBC (ref 0–0.2)
PLATELET # BLD AUTO: 316 K/UL (ref 164–446)
PMV BLD AUTO: 9.3 FL (ref 9–12.9)
POTASSIUM SERPL-SCNC: 3.6 MMOL/L (ref 3.6–5.5)
PROT SERPL-MCNC: 7.3 G/DL (ref 6–8.2)
RBC # BLD AUTO: 4.73 M/UL (ref 4.2–5.4)
SODIUM SERPL-SCNC: 139 MMOL/L (ref 135–145)
TROPONIN T SERPL-MCNC: <6 NG/L (ref 6–19)
WBC # BLD AUTO: 9 K/UL (ref 4.8–10.8)

## 2023-07-30 PROCEDURE — 99284 EMERGENCY DEPT VISIT MOD MDM: CPT

## 2023-07-30 PROCEDURE — 85025 COMPLETE CBC W/AUTO DIFF WBC: CPT

## 2023-07-30 PROCEDURE — 84484 ASSAY OF TROPONIN QUANT: CPT

## 2023-07-30 PROCEDURE — 85379 FIBRIN DEGRADATION QUANT: CPT

## 2023-07-30 PROCEDURE — 80053 COMPREHEN METABOLIC PANEL: CPT

## 2023-07-30 PROCEDURE — 93005 ELECTROCARDIOGRAM TRACING: CPT | Performed by: EMERGENCY MEDICINE

## 2023-07-30 PROCEDURE — 93005 ELECTROCARDIOGRAM TRACING: CPT

## 2023-07-30 PROCEDURE — A9270 NON-COVERED ITEM OR SERVICE: HCPCS | Performed by: EMERGENCY MEDICINE

## 2023-07-30 PROCEDURE — 36415 COLL VENOUS BLD VENIPUNCTURE: CPT

## 2023-07-30 PROCEDURE — 84703 CHORIONIC GONADOTROPIN ASSAY: CPT

## 2023-07-30 PROCEDURE — 71045 X-RAY EXAM CHEST 1 VIEW: CPT

## 2023-07-30 PROCEDURE — 700102 HCHG RX REV CODE 250 W/ 637 OVERRIDE(OP): Performed by: EMERGENCY MEDICINE

## 2023-07-30 RX ORDER — IBUPROFEN 600 MG/1
600 TABLET ORAL ONCE
Status: COMPLETED | OUTPATIENT
Start: 2023-07-30 | End: 2023-07-30

## 2023-07-30 RX ADMIN — IBUPROFEN 600 MG: 600 TABLET, FILM COATED ORAL at 20:16

## 2023-07-30 ASSESSMENT — HEART SCORE
RISK FACTORS: NO KNOWN RISK FACTORS
AGE: <45
HEART SCORE: 0
ECG: NORMAL
TROPONIN: LESS THAN OR EQUAL TO NORMAL LIMIT
HISTORY: SLIGHTLY SUSPICIOUS

## 2023-07-30 ASSESSMENT — PAIN DESCRIPTION - DESCRIPTORS: DESCRIPTORS: ACHING;DULL

## 2023-07-30 ASSESSMENT — FIBROSIS 4 INDEX: FIB4 SCORE: 0.38

## 2023-07-31 NOTE — ED TRIAGE NOTES
Natasha Weston  33 y.o..  female.  Chief Complaint   Patient presents with    Chest Pain     C/o left sided chest pain started Thursday and progressively getting worse. Pain with deep inspiration with SOB.  Described pain as constant aching / dull. Patient also states driving all day from [place].

## 2023-07-31 NOTE — DISCHARGE INSTRUCTIONS
You were seen in the Emergency Department for chest pain.    EKG, labs, chest xray were completed without significant acute abnormalities.    Please use 1,000mg of tylenol or 600mg of ibuprofen every 6 hours as needed for pain.    Please follow up with your primary care physician.    Return to the Emergency Department with worsening chest pain, trouble breathing, severe lightheadedness or fainting, or other concerns.

## 2023-07-31 NOTE — ED NOTES
Pt understands DC instructions, IV removed, DC paperwork provided, pt ambulated to ALESSIA dean with steady gait for DC

## 2023-07-31 NOTE — ED PROVIDER NOTES
"ED Provider Note    CHIEF COMPLAINT  Chief Complaint   Patient presents with    Chest Pain     C/o left sided chest pain started Thursday and progressively getting worse. Pain with deep inspiration with SOB.  Described pain as constant aching / dull. Patient also states driving all day from [place].      EXTERNAL RECORDS REVIEWED  Patient last seen by her primary care physician July 17 for nasal congestion.    HPI/ROS  LIMITATION TO HISTORY   Select: : None  OUTSIDE HISTORIAN(S):  None    Natasha Weston is a 33 y.o. female who presents to the Emergency Department with left-sided chest pain onset 3 days ago. The patient states she traveled to [place] by car a week ago, then a few days ago was having trouble due to a pressure in her chest that persisted. They drove back from [place] yesterday and her pain worsened, prompting her to the ED today. She describes her pain as a constant aching/dull pain, which is exacerbated when she takes a deep breath. She also experiences shortness of breath. Patient denies leg swelling. Denies taking birth control or hormone treatment, chance of pregnancy, or family history of blood clots. Denies drug usage, but reports that she drinks occassionally. No alleviating factors reported. Patient adds that she was seen at this facility earlier this year for chest pain; however, that pain was different.     PAST MEDICAL HISTORY  Past Medical History:   Diagnosis Date    Acute nasopharyngitis     Mild cold symptoms starting 7/25/22, stuffy nose and cough denies having a fever.  Negative home COVID test 7/28/22.  7/29/22 - states as of today has a dry cough will notify Surgeon if symptoms haven't resolved in the next few days.    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 \"Anxiety attacks\" - no recent problems, no medication.    Seasonal allergies       SURGICAL HISTORY  Past Surgical History:   Procedure Laterality Date    OH LAP,DIAGNOSTIC ABDOMEN N/A 8/12/2022    Procedure: LAPAROSCOPY;  Surgeon: Car Munguia M.D.;  Location: SURGERY SAME DAY [facility];  Service: Gynecology    SALPINGECTOMY Bilateral 8/12/2022    Procedure: SALPINGECTOMY;  Surgeon: Car Munguia M.D.;  Location: SURGERY SAME DAY [facility];  Service: Gynecology    APPENDECTOMY  2016    LEEP  01/01/2011      FAMILY HISTORY  Family History   Problem Relation Age of Onset    Cancer Mother         cervical, mid-30s    Ovarian Cancer Mother         mid-30s    Hyperlipidemia Father     Heart Disease Father         MI x2    Seizures Sister     GI Disease Sister         \"intestinal problem\"    Breast Cancer Maternal Grandmother     Diabetes Maternal Grandmother     Colorectal Cancer Neg Hx     Peritoneal Cancer Neg Hx     Tubal Cancer Neg Hx      SOCIAL HISTORY   reports that she has quit smoking. Her smoking use included cigarettes. She has a 0.25 pack-year smoking history. She has never used smokeless tobacco. She reports current alcohol use of about 0.6 oz of alcohol per week. She reports that she does not currently use drugs after having used the following drugs: Marijuana.    CURRENT MEDICATIONS  Previous Medications    IBUPROFEN (MOTRIN PO)    Take  by mouth.    MULTIPLE VITAMIN (MULTIVITAMIN ADULT PO)    Take  by mouth every day.     ALLERGIES  Keppra [levetiracetam] and Mushroom extract complex    PHYSICAL EXAM  BP (!) 163/110   Pulse 83   Temp 37.2 °C (98.9 °F) (Temporal)   Resp 20   Ht 1.626 m (5' 4\")   Wt 89.4 kg (197 lb)   SpO2 99%      Constitutional: Nontoxic appearing. Alert in no apparent distress.  HENT: Normocephalic, Atraumatic. Bilateral external ears normal. Nose " normal.  Moist mucous membranes.  Oropharynx clear.  Eyes: Pupils are equal and reactive. Conjunctiva normal.   Neck: Supple, full range of motion  Heart: Regular rate and rhythm.  No murmurs.    Lungs: Reproducible anterior chest wall tenderness. No respiratory distress, normal work of breathing. Lungs clear to auscultation bilaterally.  Abdomen Soft, no distention.  No tenderness to palpation.  Musculoskeletal: Atraumatic. No obvious deformities noted.  No lower extremity edema.  Skin: Warm, Dry.  No erythema, No rash.   Neurologic: Alert and oriented x3. Moving all extremities spontaneously without focal deficits.  Psychiatric: Affect normal, Mood normal, Appears appropriate and not intoxicated.    DIAGNOSTIC STUDIES / PROCEDURES    EKG  I have independently interpreted this EKG  Results for orders placed or performed during the hospital encounter of 23   EKG (NOW)   Result Value Ref Range    Report       [facility] Emergency Dept.    Test Date:  2023  Pt Name:    MERLY FERRARI      Department: ER  MRN:        [id number]                      Room:  Gender:     Female                       Technician: [id number]  :        1990                   Requested By:ER TRIAGE PROTOCOL  Order #:    [id number]                    Reading MD: Amalia Montana MD    Measurements  Intervals                                Axis  Rate:       85                           P:          19  PA:         125                          QRS:        39  QRSD:       90                           T:          -10  QT:         370  QTc:        440    Interpretive Statements  Sinus rhythm  Normal intervals, no ectopy  No ST or T wave change  Compared to ECG 2022 10:40:19  No significant change from prior  Electronically Signed On 2023 20:00:05 PDT by Amalia Montana MD       LABS  Labs Reviewed   CBC WITH DIFFERENTIAL - Abnormal; Notable for the following components:       Result Value     Lymphocytes 20.50 (*)     All other components within normal limits   COMP METABOLIC PANEL   TROPONIN   D-DIMER   HCG QUAL SERUM   ESTIMATED GFR     RADIOLOGY  I have independently interpreted the diagnostic imaging associated with this visit and am waiting the final reading from the radiologist.   My preliminary interpretation is a follows: No infiltrate  Radiologist interpretation:   DX-CHEST-PORTABLE (1 VIEW)   Final Result      No acute cardiopulmonary disease.        COURSE & MEDICAL DECISION MAKING  7:54 PM - Patient seen and examined at bedside. Informed patient that her initial lab studies and CXR appear reassuring. Discussed plan of care, including D-dimer to evaluate for PE. Patient agrees to the plan of care. The patient will be medicated with ibuprofen tablet 600 mg. Ordered for DX-Chest, Troponin, CMP, CBC w/ Diff to evaluate her symptoms.     ED Observation Status? Yes; I am placing the patient in to an observation status due to a diagnostic uncertainty as well as therapeutic intensity. Patient placed in observation status at 7:58 PM, 7/30/2023.     Observation plan is as follows: We will manage the patient's symptoms, evaluate with lab work and imaging, and reassess after results are reviewed.    Upon Reevaluation, the patient's condition has: Improved; and will be discharged.    Patient discharged from ED Observation status at 8:31 PM on 7/30/2023.     INITIAL ASSESSMENT, COURSE AND PLAN  Care Narrative: Otherwise healthy patient presents with 3-day history of constant chest pain and shortness of breath with history of recent travel.  She is afebrile with normal vital signs on arrival.  No hypoxia or respiratory distress.  EKG does not show ischemia or arrhythmia.  Troponin is normal in the setting of multiple days of constant pain therefore ACS is unlikely.  D-dimer is normal making pulmonary embolism unlikely.  Chest x-ray without pneumonia, pneumothorax, pulmonary edema.  Labs are otherwise  reassuring.  Suspect symptoms may be musculoskeletal and stress/anxiety may be contributing.    8:31 PM - Upon reassessment, patient is resting comfortably with normal vital signs.  No new complaints at this time.  Discussed results with patient and/or family as well as importance of primary care follow up.  Patient understands plan of care and strict return precautions for new or changing symptoms.     ADDITIONAL PROBLEM LIST  Problem #1: Acute chest pain -work-up reassuring, HEART score 0, discharged home with antiinflammatories and outpatient follow-up      DISPOSITION AND DISCUSSIONS  Escalation of care considered, and ultimately not performed:acute inpatient care management, however at this time, the patient is most appropriate for outpatient management    Decision tools and prescription drugs considered including, but not limited to: Pain Medications over-the-counter medication should be sufficient .    The patient will return for new or worsening symptoms and is stable at the time of discharge.    The patient is referred to a primary physician for blood pressure management, diabetic screening, and for all other preventative health concerns.    DISPOSITION:  Patient will be discharged home in stable condition.    FOLLOW UP:  Tamara Coffman M.D.  [address]  [address] [id number] [phone]    Schedule an appointment as soon as possible for a visit       [facility], Emergency Dept  [address] [facility] [address] [phone]    If symptoms worsen    FINAL DIAGNOSIS  1. Chest pain, unspecified type      The note accurately reflects work and decisions made by me.  Amalia Montana M.D.  7/30/2023  9:44 PM    Marlena BELTRAN), am scribing for, and in the presence of, Amalia Montana M.D..    Electronically signed by: Marlena Menjivar), 7/30/2023    Amalia BELTRAN M.D. personally performed the services described in this documentation, as scribed by  Marlena Fried in my presence, and it is both accurate and complete.

## 2023-08-15 LAB
APOB+LDLR+PCSK9 GENE MUT ANL BLD/T: NOT DETECTED
BRCA1+BRCA2 DEL+DUP + FULL MUT ANL BLD/T: NOT DETECTED
MLH1+MSH2+MSH6+PMS2 GN DEL+DUP+FUL M: NOT DETECTED

## 2023-08-29 ENCOUNTER — HOSPITAL ENCOUNTER (OUTPATIENT)
Dept: LAB | Facility: MEDICAL CENTER | Age: 33
End: 2023-08-29
Attending: OBSTETRICS & GYNECOLOGY
Payer: COMMERCIAL

## 2023-08-29 PROCEDURE — 88175 CYTOPATH C/V AUTO FLUID REDO: CPT

## 2023-08-31 LAB
COMMENT NL11729A: NORMAL
CYTOLOGIST CVX/VAG CYTO: NORMAL
CYTOLOGY CVX/VAG DOC CYTO: NORMAL
CYTOLOGY CVX/VAG DOC THIN PREP: NORMAL
NOTE NL11727A: NORMAL
OTHER STN SPEC: NORMAL
STAT OF ADQ CVX/VAG CYTO-IMP: NORMAL

## 2023-09-12 ENCOUNTER — HOSPITAL ENCOUNTER (OUTPATIENT)
Facility: MEDICAL CENTER | Age: 33
End: 2023-09-12
Attending: PHYSICIAN ASSISTANT
Payer: COMMERCIAL

## 2023-09-12 ENCOUNTER — OFFICE VISIT (OUTPATIENT)
Dept: URGENT CARE | Facility: PHYSICIAN GROUP | Age: 33
End: 2023-09-12
Payer: COMMERCIAL

## 2023-09-12 VITALS
HEIGHT: 64 IN | HEART RATE: 76 BPM | BODY MASS INDEX: 34.15 KG/M2 | TEMPERATURE: 98 F | OXYGEN SATURATION: 96 % | SYSTOLIC BLOOD PRESSURE: 140 MMHG | RESPIRATION RATE: 16 BRPM | DIASTOLIC BLOOD PRESSURE: 90 MMHG | WEIGHT: 200 LBS

## 2023-09-12 DIAGNOSIS — R53.81 MALAISE AND FATIGUE: ICD-10-CM

## 2023-09-12 DIAGNOSIS — R30.0 DYSURIA: ICD-10-CM

## 2023-09-12 DIAGNOSIS — R53.83 MALAISE AND FATIGUE: ICD-10-CM

## 2023-09-12 DIAGNOSIS — R03.0 ELEVATED BLOOD PRESSURE READING: ICD-10-CM

## 2023-09-12 DIAGNOSIS — R10.9 FLANK PAIN: ICD-10-CM

## 2023-09-12 LAB
APPEARANCE UR: NORMAL
BILIRUB UR STRIP-MCNC: NEGATIVE MG/DL
COLOR UR AUTO: YELLOW
FLUAV RNA SPEC QL NAA+PROBE: NEGATIVE
FLUBV RNA SPEC QL NAA+PROBE: NEGATIVE
GLUCOSE UR STRIP.AUTO-MCNC: NEGATIVE MG/DL
KETONES UR STRIP.AUTO-MCNC: NEGATIVE MG/DL
LEUKOCYTE ESTERASE UR QL STRIP.AUTO: NEGATIVE
NITRITE UR QL STRIP.AUTO: NEGATIVE
PH UR STRIP.AUTO: 5.5 [PH] (ref 5–8)
POCT INT CON NEG: NEGATIVE
POCT INT CON POS: POSITIVE
POCT URINE PREGNANCY TEST: NEGATIVE
PROT UR QL STRIP: NEGATIVE MG/DL
RBC UR QL AUTO: NORMAL
RSV RNA SPEC QL NAA+PROBE: NEGATIVE
SARS-COV-2 RNA RESP QL NAA+PROBE: NEGATIVE
SP GR UR STRIP.AUTO: 1.02
UROBILINOGEN UR STRIP-MCNC: 0.2 MG/DL

## 2023-09-12 PROCEDURE — 81002 URINALYSIS NONAUTO W/O SCOPE: CPT | Performed by: PHYSICIAN ASSISTANT

## 2023-09-12 PROCEDURE — 99215 OFFICE O/P EST HI 40 MIN: CPT | Performed by: PHYSICIAN ASSISTANT

## 2023-09-12 PROCEDURE — 81025 URINE PREGNANCY TEST: CPT | Performed by: PHYSICIAN ASSISTANT

## 2023-09-12 PROCEDURE — 3077F SYST BP >= 140 MM HG: CPT | Performed by: PHYSICIAN ASSISTANT

## 2023-09-12 PROCEDURE — 3080F DIAST BP >= 90 MM HG: CPT | Performed by: PHYSICIAN ASSISTANT

## 2023-09-12 PROCEDURE — 87086 URINE CULTURE/COLONY COUNT: CPT

## 2023-09-12 PROCEDURE — 0241U POCT CEPHEID COV-2, FLU A/B, RSV - PCR: CPT | Performed by: PHYSICIAN ASSISTANT

## 2023-09-12 RX ORDER — IBUPROFEN 800 MG/1
TABLET ORAL
COMMUNITY
Start: 2023-08-13 | End: 2023-09-12

## 2023-09-12 RX ORDER — IBUPROFEN 600 MG/1
600 TABLET ORAL EVERY 6 HOURS PRN
Qty: 30 TABLET | Refills: 0 | Status: SHIPPED | OUTPATIENT
Start: 2023-09-12

## 2023-09-12 RX ORDER — TIZANIDINE 2 MG/1
TABLET ORAL
COMMUNITY
Start: 2023-08-13

## 2023-09-12 RX ORDER — VALACYCLOVIR HYDROCHLORIDE 1 G/1
TABLET, FILM COATED ORAL
COMMUNITY
Start: 2023-08-29

## 2023-09-12 RX ORDER — SULFAMETHOXAZOLE AND TRIMETHOPRIM 800; 160 MG/1; MG/1
1 TABLET ORAL 2 TIMES DAILY
Qty: 20 TABLET | Refills: 0 | Status: SHIPPED | OUTPATIENT
Start: 2023-09-12 | End: 2023-09-22

## 2023-09-12 ASSESSMENT — FIBROSIS 4 INDEX: FIB4 SCORE: 0.38

## 2023-09-12 ASSESSMENT — ENCOUNTER SYMPTOMS
NAUSEA: 0
CONSTIPATION: 0
FEVER: 1
ABDOMINAL PAIN: 1
VOMITING: 0
COUGH: 1
CHILLS: 1
BLOOD IN STOOL: 0
FLANK PAIN: 1
DIARRHEA: 0

## 2023-09-12 NOTE — RESULT ENCOUNTER NOTE
Emeka Paz,    Your test for COVID, flu, and RSV was negative.  I do not recommend starting the Tamiflu.    Kind regards,  Ramakrishna

## 2023-09-12 NOTE — PROGRESS NOTES
Subjective:   Natasha Weston is a 33 y.o. female who presents for Side Pain (R side pain/kidney pain, abdominal pain, onset yesterday. )        Patient presents with concerns of dysuria, urinary urgency and urinary frequency for the last 3 to 4 days and right-sided flank pain that began yesterday.  Flank pain has continued to worsen and radiates to her right lower quadrant at times.  Pain is constant and worse with movements and palpation.  Rest somewhat alleviates her pain.  She states that current symptoms feel identical to those she experienced with pyelonephritis.  She had this in 2012 and was hospitalized for this.  She denies nausea and vomiting.  She states that she had a fever yesterday, but thinks this may be associated with her cough and cold symptoms.  She is not taking any medications for symptoms and has not taken any Tylenol or ibuprofen today.   She did a TeleDoc appointment yesterday for her cough and cold sx and was clinically diagnosed with influenza.  Tamiflu was sent into her pharmacy but she has not yet begun this medication.  She denies history of kidney stones.  Past surgical history significant for appendectomy and salpingectomy.        Review of Systems   Constitutional:  Positive for chills, fever and malaise/fatigue.   HENT:  Positive for congestion.    Respiratory:  Positive for cough.    Gastrointestinal:  Positive for abdominal pain. Negative for blood in stool, constipation, diarrhea, melena, nausea and vomiting.   Genitourinary:  Positive for dysuria, flank pain, frequency and urgency. Negative for hematuria.       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  MEDS:   Current  "Outpatient Medications:     valacyclovir (VALTREX) 1 GM Tab, TAKE 1 TABLET BY MOUTH ONCE DAILY FOR 3 DAYS. STARTING WITH FIRST SYMPTOMS, Disp: , Rfl:     sulfamethoxazole-trimethoprim (BACTRIM DS) 800-160 MG tablet, Take 1 Tablet by mouth 2 times a day for 10 days., Disp: 20 Tablet, Rfl: 0    ibuprofen (MOTRIN) 600 MG Tab, Take 1 Tablet by mouth every 6 hours as needed for Moderate Pain., Disp: 30 Tablet, Rfl: 0    Multiple Vitamin (MULTIVITAMIN ADULT PO), Take  by mouth every day., Disp: , Rfl:     tizanidine (ZANAFLEX) 2 MG tablet, , Disp: , Rfl:   ALLERGIES:   Allergies   Allergen Reactions    Keppra [Levetiracetam] Unspecified     Severe Depression suicidal thoughts    Mushroom Extract Complex Anaphylaxis     Pt reports her throat swells     SURGHX:   Past Surgical History:   Procedure Laterality Date    [place] LAP,DIAGNOSTIC ABDOMEN N/A 8/12/2022    Procedure: LAPAROSCOPY;  Surgeon: Car Munguia M.D.;  Location: SURGERY SAME DAY [facility];  Service: Gynecology    SALPINGECTOMY Bilateral 8/12/2022    Procedure: SALPINGECTOMY;  Surgeon: Car Munguia M.D.;  Location: SURGERY SAME DAY [facility];  Service: Gynecology    APPENDECTOMY  2016    LEEP  01/01/2011     SOCHX:  reports that she has quit smoking. Her smoking use included cigarettes. She has a 0.3 pack-year smoking history. She has never used smokeless tobacco. She reports current alcohol use of about 0.6 oz of alcohol per week. She reports that she does not currently use drugs after having used the following drugs: Marijuana.  FH: Family history was reviewed, no pertinent findings to report   Objective:   BP (!) 140/90 (BP Location: Left arm, Patient Position: Sitting, BP Cuff Size: Adult)   Pulse 76   Temp 36.7 °C (98 °F) (Temporal)   Resp 16   Ht 1.626 m (5' 4\")   Wt 90.7 kg (200 lb)   SpO2 96%   BMI 34.33 kg/m²   Physical Exam  Vitals reviewed.   Constitutional:       General: She is not in acute distress.     Appearance: Normal appearance. " She is well-developed. She is not toxic-appearing.   HENT:      Head: Normocephalic and atraumatic.      Right Ear: External ear normal.      Left Ear: External ear normal.      Nose: Congestion present.      Mouth/Throat:      Lips: Pink.      Mouth: Mucous membranes are moist.      Pharynx: Oropharynx is clear. Uvula midline.   Cardiovascular:      Rate and Rhythm: Normal rate and regular rhythm.   Pulmonary:      Effort: Pulmonary effort is normal. No respiratory distress.      Breath sounds: No stridor.   Abdominal:      Comments: Abdomen is soft and nontender to palpation.  No guarding rebound tenderness.  Patient does have right CVA tenderness.  No left CVA tenderness.  No palpable masses organomegaly.   Skin:     General: Skin is dry.   Neurological:      Comments: Alert and oriented.    Psychiatric:         Speech: Speech normal.         Behavior: Behavior normal.           Results for orders placed or performed in visit on 09/12/23   POCT Urinalysis   Result Value Ref Range    POC Color yellow Negative    POC Appearance slightly cloudy Negative    POC Glucose negative Negative mg/dL    POC Bilirubin negative Negative mg/dL    POC Ketones negative Negative mg/dL    POC Specific Gravity 1.025 <1.005 - >1.030    POC Blood trace-lysed Negative    POC Urine PH 5.5 5.0 - 8.0    POC Protein negative Negative mg/dL    POC Urobiligen 0.2 Negative (0.2) mg/dL    POC Nitrites negative Negative    POC Leukocyte Esterase negative Negative   POCT Pregnancy   Result Value Ref Range    POC Urine Pregnancy Test Negative     Internal Control Positive Positive     Internal Control Negative Negative    POCT CoV-2, Flu A/B, RSV by PCR   Result Value Ref Range    SARS-CoV-2 by PCR Negative Negative, Invalid    Influenza virus A RNA Negative Negative, Invalid    Influenza virus B, PCR Negative Negative, Invalid    RSV, PCR Negative Negative, Invalid       Assessment/Plan:   1. Flank pain  - POCT Urinalysis  - POCT Pregnancy  -  sulfamethoxazole-trimethoprim (BACTRIM DS) 800-160 MG tablet; Take 1 Tablet by mouth 2 times a day for 10 days.  Dispense: 20 Tablet; Refill: 0  - URINE CULTURE(NEW); Future  - ibuprofen (MOTRIN) 600 MG Tab; Take 1 Tablet by mouth every 6 hours as needed for Moderate Pain.  Dispense: 30 Tablet; Refill: 0    2. Dysuria  - sulfamethoxazole-trimethoprim (BACTRIM DS) 800-160 MG tablet; Take 1 Tablet by mouth 2 times a day for 10 days.  Dispense: 20 Tablet; Refill: 0  - URINE CULTURE(NEW); Future  - ibuprofen (MOTRIN) 600 MG Tab; Take 1 Tablet by mouth every 6 hours as needed for Moderate Pain.  Dispense: 30 Tablet; Refill: 0    3. Malaise and fatigue  - POCT CoV-2, Flu A/B, RSV by PCR  - URINE CULTURE(NEW); Future    4. Elevated blood pressure reading    Other orders  - tizanidine (ZANAFLEX) 2 MG tablet;  (Patient not taking: Reported on 9/12/2023)  - valacyclovir (VALTREX) 1 GM Tab; TAKE 1 TABLET BY MOUTH ONCE DAILY FOR 3 DAYS. STARTING WITH FIRST SYMPTOMS    Records reviewed.  I am unable to find patient's records when she had pyelonephritis.      Patient has RBCs in her urine today but no leuks or nitrites.  However given her history and symptoms I am concerned about a urinary tract infection and possible early pyelonephritis.  Her blood pressure is mildly elevated today but otherwise vital signs are stable.  Nephrolithiasis is also a possibility, but seems less likely based on history and symptoms.    Urine culture is pending.  Patient started on Bactrim.  Ensure adequate fluid intake.    Recommend immediate reevaluation in the emergency department with any new or worsening symptoms.  Additionally if fevers recur or symptoms fail to improve in the next 48 hours patient should also be reevaluated in the emergency room.      2.  Patient's viral panel is negative.  I advised against starting the Tamiflu.  Continue to monitor symptoms and treat symptomatically.  Strict return precautions.    My total time spent caring  for the patient on the day of the encounter was 45 minutes.   This does not include time spent on separately billable procedures/tests.

## 2023-09-13 ENCOUNTER — OFFICE VISIT (OUTPATIENT)
Dept: URGENT CARE | Facility: PHYSICIAN GROUP | Age: 33
End: 2023-09-13
Payer: COMMERCIAL

## 2023-09-13 ENCOUNTER — HOSPITAL ENCOUNTER (OUTPATIENT)
Dept: RADIOLOGY | Facility: MEDICAL CENTER | Age: 33
End: 2023-09-13
Attending: NURSE PRACTITIONER
Payer: COMMERCIAL

## 2023-09-13 VITALS
WEIGHT: 199 LBS | HEART RATE: 80 BPM | HEIGHT: 64 IN | OXYGEN SATURATION: 96 % | BODY MASS INDEX: 33.97 KG/M2 | SYSTOLIC BLOOD PRESSURE: 118 MMHG | DIASTOLIC BLOOD PRESSURE: 90 MMHG | TEMPERATURE: 98.9 F | RESPIRATION RATE: 16 BRPM

## 2023-09-13 DIAGNOSIS — R11.0 NAUSEA: ICD-10-CM

## 2023-09-13 DIAGNOSIS — R10.9 FLANK PAIN: ICD-10-CM

## 2023-09-13 DIAGNOSIS — N10 PYELONEPHRITIS, ACUTE: ICD-10-CM

## 2023-09-13 LAB
APPEARANCE UR: CLEAR
BILIRUB UR STRIP-MCNC: NEGATIVE MG/DL
COLOR UR AUTO: YELLOW
GLUCOSE UR STRIP.AUTO-MCNC: NEGATIVE MG/DL
KETONES UR STRIP.AUTO-MCNC: NEGATIVE MG/DL
LEUKOCYTE ESTERASE UR QL STRIP.AUTO: NEGATIVE
NITRITE UR QL STRIP.AUTO: NEGATIVE
PH UR STRIP.AUTO: 5.5 [PH] (ref 5–8)
PROT UR QL STRIP: NEGATIVE MG/DL
RBC UR QL AUTO: NORMAL
SP GR UR STRIP.AUTO: 1.01
UROBILINOGEN UR STRIP-MCNC: 0.2 MG/DL

## 2023-09-13 PROCEDURE — 81002 URINALYSIS NONAUTO W/O SCOPE: CPT | Performed by: NURSE PRACTITIONER

## 2023-09-13 PROCEDURE — 74176 CT ABD & PELVIS W/O CONTRAST: CPT

## 2023-09-13 PROCEDURE — 3074F SYST BP LT 130 MM HG: CPT | Performed by: NURSE PRACTITIONER

## 2023-09-13 PROCEDURE — 3080F DIAST BP >= 90 MM HG: CPT | Performed by: NURSE PRACTITIONER

## 2023-09-13 PROCEDURE — 99214 OFFICE O/P EST MOD 30 MIN: CPT | Mod: 25 | Performed by: NURSE PRACTITIONER

## 2023-09-13 RX ORDER — KETOROLAC TROMETHAMINE 30 MG/ML
30 INJECTION, SOLUTION INTRAMUSCULAR; INTRAVENOUS ONCE
Status: COMPLETED | OUTPATIENT
Start: 2023-09-13 | End: 2023-09-13

## 2023-09-13 RX ORDER — ONDANSETRON 4 MG/1
4 TABLET, ORALLY DISINTEGRATING ORAL EVERY 6 HOURS PRN
Qty: 15 TABLET | Refills: 0 | Status: SHIPPED | OUTPATIENT
Start: 2023-09-13

## 2023-09-13 RX ADMIN — KETOROLAC TROMETHAMINE 30 MG: 30 INJECTION, SOLUTION INTRAMUSCULAR; INTRAVENOUS at 11:05

## 2023-09-13 ASSESSMENT — FIBROSIS 4 INDEX: FIB4 SCORE: 0.38

## 2023-09-13 NOTE — PROGRESS NOTES
Date: 09/13/23     Chief Complaint:    Chief Complaint   Patient presents with    Pain     Was seen yesterday at [facility] and was diagnosed with a kidney infection. She talked to her mom-in-law who is an NP and was advised that she needed to have imaging done for possible kidney stones.         History of Present Illness: 33 y.o.  female presents to clinic with concerns for a right kidney stone due to right flank pain.  Patient was seen yesterday and diagnosed with likely pyelonephritis as patient states her symptoms were similar to previous pyelonephritis illness.  For several days she has been having right flank pain and urinary frequency.  She denies any dysuria.  Patient did start Bactrim as prescribed, she has taken 2 doses.  She has also been taking ibuprofen which is mildly helpful.  She states she continues to have right flank pain.  She states the pain never 100% goes away but does change in severity.  She reports she did have a fever 2 days ago as well as this morning, although she is also having cold-like symptoms.  She was ruled out for COVID influenza and RSV yesterday.  She has no history of kidney stones.  She states she feels the pain has worsened since yesterday.  She is having some nausea no vomiting she did began having diarrhea yesterday after urgent care visit.  She states the diarrhea started prior to the antibiotic dosing.  She denies any vaginal symptoms.      ROS:    As stated in HPI     Medical/SX/ Social History:  Reviewed per chart    Pertinent Medications:    Current Outpatient Medications on File Prior to Visit   Medication Sig Dispense Refill    sulfamethoxazole-trimethoprim (BACTRIM DS) 800-160 MG tablet Take 1 Tablet by mouth 2 times a day for 10 days. 20 Tablet 0    ibuprofen (MOTRIN) 600 MG Tab Take 1 Tablet by mouth every 6 hours as needed for Moderate Pain. 30 Tablet 0    Multiple Vitamin (MULTIVITAMIN ADULT PO) Take  by mouth every day.      tizanidine (ZANAFLEX)  2 MG tablet  (Patient not taking: Reported on 9/12/2023)      valacyclovir (VALTREX) 1 GM Tab TAKE 1 TABLET BY MOUTH ONCE DAILY FOR 3 DAYS. STARTING WITH FIRST SYMPTOMS       No current facility-administered medications on file prior to visit.        Allergies:    Keppra [levetiracetam] and Mushroom extract complex     Problem list, medications, and allergies reviewed by myself today in Epic     Physical Exam:    Vitals:    09/13/23 0951   BP: (!) 118/90   Pulse: 80   Resp: 16   Temp: 37.2 °C (98.9 °F)   SpO2: 96%             Physical Exam  Constitutional:       General: She is not in acute distress.     Appearance: Normal appearance. She is well-developed. She is not ill-appearing or toxic-appearing.   HENT:      Head: Normocephalic and atraumatic.   Cardiovascular:      Rate and Rhythm: Normal rate and regular rhythm.      Heart sounds: Normal heart sounds.   Pulmonary:      Effort: Pulmonary effort is normal. No respiratory distress.      Breath sounds: Normal breath sounds. No wheezing.   Abdominal:      General: Abdomen is flat. Bowel sounds are normal.      Palpations: Abdomen is soft.      Tenderness: There is no abdominal tenderness. There is right CVA tenderness. There is no left CVA tenderness, guarding or rebound.   Musculoskeletal:      Thoracic back: No spasms or tenderness.      Lumbar back: No spasms or tenderness.      Comments: No muscular tenderness to light palpation on exam   Skin:     General: Skin is warm.      Capillary Refill: Capillary refill takes less than 2 seconds.      Coloration: Skin is not cyanotic or pale.   Neurological:      Mental Status: She is alert and oriented to person, place, and time.      Gait: Gait is intact.   Psychiatric:         Behavior: Behavior normal. Behavior is cooperative.            Diagnostics:      Results for orders placed or performed in visit on 09/13/23   POCT Urinalysis   Result Value Ref Range    POC Color Yellow Negative    POC Appearance Clear  Negative    POC Glucose Negative Negative mg/dL    POC Bilirubin Negative Negative mg/dL    POC Ketones Negative Negative mg/dL    POC Specific Gravity 1.010 <1.005 - >1.030    POC Blood Moderate Negative    POC Urine PH 5.5 5.0 - 8.0    POC Protein Negative Negative mg/dL    POC Urobiligen 0.2 Negative (0.2) mg/dL    POC Nitrites Negative Negative    POC Leukocyte Esterase Negative Negative     POCT pregnancy negative yesterday    CT-RENAL COLIC EVALUATION(A/P W/O)    Result Date: 9/13/2023 9/13/2023 10:54 AM HISTORY/REASON FOR EXAM:  Right-sided flank pain. TECHNIQUE/EXAM DESCRIPTION AND NUMBER OF VIEWS: CT scan renal/colic without contrast. 5 mm helical images of the abdomen and pelvis were obtained from the diaphragmatic domes through the pubic symphysis. Low dose optimization technique was utilized for this CT exam including automated exposure control and adjustment of the mA and/or kV according to patient size. COMPARISON:  8/31/2022 FINDINGS: Abdomen: There is no evidence of focal consolidation or pleural effusion within the lung bases. There is fatty change of the liver. Spleen is enlarged. No large masses within the upper abdomen. There is no evidence of renal or ureteral stone or evidence of hydronephrosis. Pelvis: There has been prior appendectomy. There is no evidence of inflammatory change seen in the region of the bowel. There is minimal free fluid dependently within the pelvis. There is a menstrual cup present.     1.  No evidence of renal or ureteral stone or evidence of hydronephrosis. No evidence of inflammatory change. 2.  Fatty change of the liver. 3.  Mild splenomegaly. 4.  Prior appendectomy.      Diagnostics interpreted by myself.  No visualized stone within the renal system ureter or bladder.    Medical Decision making and plan :  I personally reviewed prior external notes and test results pertinent to today's visit. Pt is clinically stable at today's acute urgent care visit.  Patient  appears nontoxic with no acute distress noted. Appropriate for outpatient care at this time. The patient remained stable during the urgent care visit.     Pleasant 33 y.o. female presented clinic with continued flank pain that is colicky in nature to the right flank.  Did obtain a repeat POCT urinalysis which did show an increased amount of blood in the urine when compared to yesterdays sample.  Pain is not reproducible with light palpation,  low suspicion of muscular etiology as a cause of pain.  Discussed differentials to include nephrolithiasis versus pyelonephritis.  Using shared decision making patient was given in clinic Toradol as well as Rocephin.  She did tolerate well.  Fortunately able to obtain CT renal colic while she is here in clinic to rule out nephrolithiasis.  Fortunately, CT renal colic negative for nephrolithiasis.  30 minutes after Toradol injection patient did have improved pain.  No signs of adverse reaction from Rocephin injection.  Advised patient to continue Bactrim as prescribed.  Advised that if she has a readmittance of fever tomorrow or she is not feeling better in the next 24 to 36 hours she needs to seek high-level care.  Shared decision-making was utilized with patient for treatment plan.  Differential Diagnosis, natural history, and supportive care discussed.        Administrations This Visit       cefTRIAXone (Rocephin) 1,000 mg in lidocaine (Xylocaine) 1 % 4 mL for IM use       Admin Date  09/13/2023 Action  Given Dose  1,000 mg Route  Intramuscular Administered By  Nicolehortencia Galo, Med Ass't              ketorolac (Toradol) injection 30 mg       Admin Date  09/13/2023 Action  Given Dose  30 mg Route  Intramuscular Administered By  Nicolehortencia Galo, Med Ass't                   1. Pyelonephritis, acute    - cefTRIAXone (Rocephin) 1,000 mg in lidocaine (Xylocaine) 1 % 4 mL for IM use    2. Flank pain    - ketorolac (Toradol) injection 30 mg  - CT-RENAL COLIC EVALUATION(A/P W/O);  Future  - POCT Urinalysis  - cefTRIAXone (Rocephin) 1,000 mg in lidocaine (Xylocaine) 1 % 4 mL for IM use    3. Nausea    - ondansetron (ZOFRAN ODT) 4 MG TABLET DISPERSIBLE; Take 1 Tablet by mouth every 6 hours as needed for Nausea/Vomiting for up to 15 doses.  Dispense: 15 Tablet; Refill: 0     Medication discussed included indication for use and the potential benefits and side effects. Education was provided regarding the aforementioned assessments.  All of the patient's questions were answered to their satisfaction at the time of discharge. Patient was encouraged to monitor symptoms closely. Those signs and symptoms which would warrant concern and mandate seeking a higher level of service through the emergency department discussed at length.  Patient stated agreement and understanding of this plan of care.    Disposition:  Home in stable condition       Voice Recognition Disclaimer:  Portions of this document were created using voice recognition software. The software does have a chance of producing errors of grammar and possibly content. I have made every reasonable attempt to correct obvious errors, but there may be errors of grammar and possibly content that I did not discover before finalizing the documentation.    Mali Martinez, [email or web address].

## 2023-09-13 NOTE — LETTER
September 13, 2023    To Whom It May Concern:         This is confirmation that Natasha Irish Fermínmamadoulolisok attended her scheduled appointment with AMADEO Benton on 9/13/23.  Please excuse patient from work for acute kidney infection.      Sincerely,          NOA Benton.  [phone]

## 2023-09-14 LAB
BACTERIA UR CULT: NORMAL
SIGNIFICANT IND 70042: NORMAL
SITE SITE: NORMAL
SOURCE SOURCE: NORMAL

## 2023-09-17 NOTE — RESULT ENCOUNTER NOTE
Emeka Paz,    Your urine culture was negative.  I see that you came back in to see [place] the next day-I'm so glad that you did!  Despite the negative culture, I do recommend that we complete the full course of antibiotics especially given your symptoms and symptom progression.  If your symptoms persist or fail to fully resolve please follow-up with your primary care provider for reevaluation.  I hope that you are feeling better!    Kind regards,  Ramakrishna

## 2024-03-04 ENCOUNTER — APPOINTMENT (OUTPATIENT)
Dept: MEDICAL GROUP | Facility: PHYSICIAN GROUP | Age: 34
End: 2024-03-04
Payer: COMMERCIAL

## 2024-04-07 ENCOUNTER — APPOINTMENT (OUTPATIENT)
Dept: URGENT CARE | Facility: PHYSICIAN GROUP | Age: 34
End: 2024-04-07
Payer: COMMERCIAL

## 2024-05-30 ENCOUNTER — OFFICE VISIT (OUTPATIENT)
Dept: MEDICAL GROUP | Facility: PHYSICIAN GROUP | Age: 34
End: 2024-05-30
Payer: COMMERCIAL

## 2024-05-30 VITALS
OXYGEN SATURATION: 98 % | HEART RATE: 76 BPM | DIASTOLIC BLOOD PRESSURE: 74 MMHG | BODY MASS INDEX: 33.39 KG/M2 | TEMPERATURE: 97.5 F | SYSTOLIC BLOOD PRESSURE: 126 MMHG | WEIGHT: 195.6 LBS | HEIGHT: 64 IN

## 2024-05-30 DIAGNOSIS — Z01.84 IMMUNITY STATUS TESTING: ICD-10-CM

## 2024-05-30 DIAGNOSIS — E66.9 OBESITY (BMI 30-39.9): ICD-10-CM

## 2024-05-30 DIAGNOSIS — J30.2 SEASONAL ALLERGIES: ICD-10-CM

## 2024-05-30 DIAGNOSIS — R41.840 IMPAIRED CONCENTRATION: Primary | ICD-10-CM

## 2024-05-30 ASSESSMENT — PATIENT HEALTH QUESTIONNAIRE - PHQ9: CLINICAL INTERPRETATION OF PHQ2 SCORE: 0

## 2024-05-30 ASSESSMENT — FIBROSIS 4 INDEX: FIB4 SCORE: 0.38

## 2024-05-30 NOTE — PROGRESS NOTES
Verbal consent was acquired by the patient to use [employer] ambient listening note generation during this visit     Subjective:     HPI:   History of Present Illness  The patient is a 33-year-old woman using she and her pronouns who presents for evaluation of multiple medical concerns.    The patient's  and a nurse practitioner friend expressed concern about her impulsive eating and snacking habits, potentially indicative of Attention Deficit Hyperactivity Disorder (ADHD). Despite her awareness of the need to eat, she tends to eat anyway, often snacking due to boredom. She often eats even prior to dinner and occasionally after dinner. Her workload is often questioned if she delays or avoids larger projects due to her mental capacity. She consumes approximately 30 ounces of coffee daily, with no added shots, and occasionally consumes decaffeinated coffee when she feels the caffeine intensify. She finds it challenging to focus if she does not consume caffeine.    The patient underwent a septoplasty on 11/21/2023 and a bilateral breast reduction on 04/25/2024. Prior to the septoplasty, she did not experience severe allergies. She seeks advice on allergy medications, particularly in relation to her weight loss and focus. Her symptoms include a runny nose, nasal congestion, itchy, watery eyes, mild itchy ears, and an itchy throat after consuming specific foods. She has observed thick white or clear mucus, which she is able to clear.    The patient received an influenza vaccine in 09/2023. She denies experiencing chest pain, difficulty breathing, fevers, or chills in the past few days. She had chickenpox as a child. She received a hepatitis A vaccine in high school in [place]. She has not received the COVID-19 vaccine and is not interested in receiving it at this time.    Health Maintenance: Completed    Objective:     Exam:  /74 (BP Location: Right arm, Patient Position: Sitting, BP Cuff Size: Adult)    "Pulse 76   Temp 36.4 °C (97.5 °F) (Temporal)   Ht 1.626 m (5' 4\")   Wt 88.7 kg (195 lb 9.6 oz)   SpO2 98%   BMI 33.57 kg/m²  Body mass index is 33.57 kg/m².    Physical Exam  Constitutional:       Appearance: Normal appearance.   Cardiovascular:      Rate and Rhythm: Normal rate and regular rhythm.      Heart sounds: Normal heart sounds.   Pulmonary:      Effort: Pulmonary effort is normal.      Breath sounds: Normal breath sounds.   Musculoskeletal:      Cervical back: Normal range of motion and neck supple.   Lymphadenopathy:      Cervical: No cervical adenopathy.   Neurological:      Mental Status: She is alert.             Results      Assessment & Plan:     1. Impaired concentration  Referral to Behavioral Health      2. Seasonal allergies        3. Obesity (BMI 30-39.9)  Comp Metabolic Panel      4. Immunity status testing  HEP A AB TOTAL          Assessment & Plan  1. Impaired concentration.  The patient's condition is chronic and stable. A friend, a nurse practitioner, has expressed concerns about potential Attention Deficit Hyperactivity Disorder (ADD) or ADHD, as evidenced by the patient's reported concentration issues. The patient consumes a significant amount of caffeine throughout the day and task completion. Psychological testing will be conducted to ascertain the presence of ADD or ADHD. Should the patient be diagnosed with ADD or ADHD, she will schedule an appointment to discuss potential treatment options.    2. Seasonal allergies.  This is a new diagnosis for the patient. Following a septoplasty in 11/2023, she has noticed a worsening of her allergy-like symptoms, including runny nose, congestion, itchy, watery eyes, and itchy ears. The current experts recommend Flonase as a first-line treatment, but the patient may also consider adding an over-the-counter antihistamine without a decongestant for symptom relief.    3. Obesity.  The patient's obesity is chronic and stable. She has expressed " interest in weight loss medication but is now questioning whether an undiagnosed ADD or ADHD condition is contributing to her weight gain, as she frequently snacks and finds that snacking helps her to focus. At present, we will not initiate any weight loss medications and await the diagnosis of ADD or ADHD. If the patient does have ADD or ADHD, we will initially treat the condition before initiating any weight loss medications, as stimulants can cause appetite suppression. If the patient does not have ADD or ADHD, she will schedule an appointment to discuss weight loss medications.    4. Immunity status testing.  We only have one dose of the hepatitis A vaccine listed in her chart. An antibody level will be checked to determine if she has completed the series.    Referral for genetic research was offered. Patient is a participant.    Return if symptoms worsen or fail to improve.    Please note that this dictation was created using voice recognition software. I have made every reasonable attempt to correct obvious errors, but I expect that there are errors of grammar and possibly content that I did not discover before finalizing the note.

## 2024-06-04 ENCOUNTER — HOSPITAL ENCOUNTER (OUTPATIENT)
Dept: LAB | Facility: MEDICAL CENTER | Age: 34
End: 2024-06-04
Attending: FAMILY MEDICINE
Payer: COMMERCIAL

## 2024-06-04 DIAGNOSIS — E66.9 OBESITY (BMI 30-39.9): ICD-10-CM

## 2024-06-04 DIAGNOSIS — Z01.84 IMMUNITY STATUS TESTING: ICD-10-CM

## 2024-06-04 LAB
ALBUMIN SERPL BCP-MCNC: 4.5 G/DL (ref 3.2–4.9)
ALBUMIN/GLOB SERPL: 1.7 G/DL
ALP SERPL-CCNC: 57 U/L (ref 30–99)
ALT SERPL-CCNC: 20 U/L (ref 2–50)
ANION GAP SERPL CALC-SCNC: 14 MMOL/L (ref 7–16)
AST SERPL-CCNC: 19 U/L (ref 12–45)
BILIRUB SERPL-MCNC: 0.5 MG/DL (ref 0.1–1.5)
BUN SERPL-MCNC: 11 MG/DL (ref 8–22)
CALCIUM ALBUM COR SERPL-MCNC: 8.7 MG/DL (ref 8.5–10.5)
CALCIUM SERPL-MCNC: 9.1 MG/DL (ref 8.5–10.5)
CHLORIDE SERPL-SCNC: 107 MMOL/L (ref 96–112)
CO2 SERPL-SCNC: 20 MMOL/L (ref 20–33)
CREAT SERPL-MCNC: 0.69 MG/DL (ref 0.5–1.4)
GFR SERPLBLD CREATININE-BSD FMLA CKD-EPI: 116 ML/MIN/1.73 M 2
GLOBULIN SER CALC-MCNC: 2.7 G/DL (ref 1.9–3.5)
GLUCOSE SERPL-MCNC: 112 MG/DL (ref 65–99)
POTASSIUM SERPL-SCNC: 4.2 MMOL/L (ref 3.6–5.5)
PROT SERPL-MCNC: 7.2 G/DL (ref 6–8.2)
SODIUM SERPL-SCNC: 141 MMOL/L (ref 135–145)

## 2024-06-04 PROCEDURE — 86708 HEPATITIS A ANTIBODY: CPT

## 2024-06-04 PROCEDURE — 80053 COMPREHEN METABOLIC PANEL: CPT

## 2024-06-04 PROCEDURE — 36415 COLL VENOUS BLD VENIPUNCTURE: CPT

## 2024-06-05 LAB — HAV AB SER QL IA: POSITIVE

## 2024-07-17 ENCOUNTER — APPOINTMENT (OUTPATIENT)
Dept: URGENT CARE | Facility: PHYSICIAN GROUP | Age: 34
End: 2024-07-17
Payer: COMMERCIAL

## 2024-07-20 ENCOUNTER — HOSPITAL ENCOUNTER (OUTPATIENT)
Facility: MEDICAL CENTER | Age: 34
End: 2024-07-20
Attending: FAMILY MEDICINE
Payer: COMMERCIAL

## 2024-07-20 ENCOUNTER — OFFICE VISIT (OUTPATIENT)
Dept: URGENT CARE | Facility: PHYSICIAN GROUP | Age: 34
End: 2024-07-20
Payer: COMMERCIAL

## 2024-07-20 VITALS
DIASTOLIC BLOOD PRESSURE: 80 MMHG | HEIGHT: 64 IN | SYSTOLIC BLOOD PRESSURE: 128 MMHG | BODY MASS INDEX: 32.27 KG/M2 | WEIGHT: 189 LBS | OXYGEN SATURATION: 99 % | HEART RATE: 70 BPM | RESPIRATION RATE: 18 BRPM | TEMPERATURE: 96.9 F

## 2024-07-20 DIAGNOSIS — Z87.448 HISTORY OF PYELONEPHRITIS: ICD-10-CM

## 2024-07-20 DIAGNOSIS — N39.0 RECURRENT UTI: ICD-10-CM

## 2024-07-20 LAB
APPEARANCE UR: NORMAL
BILIRUB UR STRIP-MCNC: NEGATIVE MG/DL
COLOR UR AUTO: YELLOW
GLUCOSE UR STRIP.AUTO-MCNC: NEGATIVE MG/DL
KETONES UR STRIP.AUTO-MCNC: NEGATIVE MG/DL
LEUKOCYTE ESTERASE UR QL STRIP.AUTO: NORMAL
NITRITE UR QL STRIP.AUTO: NEGATIVE
PH UR STRIP.AUTO: 6 [PH] (ref 5–8)
PROT UR QL STRIP: NEGATIVE MG/DL
RBC UR QL AUTO: NORMAL
SP GR UR STRIP.AUTO: 1.01
UROBILINOGEN UR STRIP-MCNC: NORMAL MG/DL

## 2024-07-20 PROCEDURE — 3074F SYST BP LT 130 MM HG: CPT | Performed by: FAMILY MEDICINE

## 2024-07-20 PROCEDURE — 81002 URINALYSIS NONAUTO W/O SCOPE: CPT | Performed by: FAMILY MEDICINE

## 2024-07-20 PROCEDURE — 87086 URINE CULTURE/COLONY COUNT: CPT

## 2024-07-20 PROCEDURE — 99214 OFFICE O/P EST MOD 30 MIN: CPT | Performed by: FAMILY MEDICINE

## 2024-07-20 PROCEDURE — 3079F DIAST BP 80-89 MM HG: CPT | Performed by: FAMILY MEDICINE

## 2024-07-20 RX ORDER — DEXTROAMPHETAMINE SACCHARATE, AMPHETAMINE ASPARTATE, DEXTROAMPHETAMINE SULFATE AND AMPHETAMINE SULFATE 2.5; 2.5; 2.5; 2.5 MG/1; MG/1; MG/1; MG/1
TABLET ORAL
COMMUNITY
Start: 2024-07-11

## 2024-07-20 RX ORDER — PHENAZOPYRIDINE HYDROCHLORIDE 200 MG/1
200 TABLET, FILM COATED ORAL 3 TIMES DAILY PRN
Qty: 6 TABLET | Refills: 0 | Status: SHIPPED | OUTPATIENT
Start: 2024-07-20

## 2024-07-20 RX ORDER — SULFAMETHOXAZOLE AND TRIMETHOPRIM 800; 160 MG/1; MG/1
1 TABLET ORAL EVERY 12 HOURS
Qty: 14 TABLET | Refills: 0 | Status: SHIPPED | OUTPATIENT
Start: 2024-07-20 | End: 2024-07-27

## 2024-07-20 RX ORDER — NIRMATRELVIR AND RITONAVIR 300-100 MG
KIT ORAL
COMMUNITY
Start: 2024-07-17

## 2024-07-20 ASSESSMENT — FIBROSIS 4 INDEX: FIB4 SCORE: 0.47

## 2024-07-20 ASSESSMENT — ENCOUNTER SYMPTOMS
VOMITING: 0
EYE REDNESS: 0
WEIGHT LOSS: 0
EYE DISCHARGE: 0
MYALGIAS: 0
NAUSEA: 0

## 2024-07-22 LAB
BACTERIA UR CULT: NORMAL
SIGNIFICANT IND 70042: NORMAL
SITE SITE: NORMAL
SOURCE SOURCE: NORMAL

## 2024-08-15 ENCOUNTER — APPOINTMENT (OUTPATIENT)
Dept: MEDICAL GROUP | Facility: PHYSICIAN GROUP | Age: 34
End: 2024-08-15
Payer: COMMERCIAL

## 2024-08-30 ENCOUNTER — HOSPITAL ENCOUNTER (OUTPATIENT)
Facility: MEDICAL CENTER | Age: 34
End: 2024-08-30
Attending: OBSTETRICS & GYNECOLOGY
Payer: COMMERCIAL

## 2024-09-16 ENCOUNTER — APPOINTMENT (OUTPATIENT)
Dept: MEDICAL GROUP | Facility: PHYSICIAN GROUP | Age: 34
End: 2024-09-16
Payer: COMMERCIAL

## 2024-09-19 ENCOUNTER — APPOINTMENT (OUTPATIENT)
Dept: MEDICAL GROUP | Facility: PHYSICIAN GROUP | Age: 34
End: 2024-09-19
Payer: COMMERCIAL

## 2024-09-20 ENCOUNTER — HOSPITAL ENCOUNTER (OUTPATIENT)
Facility: MEDICAL CENTER | Age: 34
End: 2024-09-20
Attending: FAMILY MEDICINE
Payer: COMMERCIAL

## 2024-09-20 ENCOUNTER — OFFICE VISIT (OUTPATIENT)
Dept: MEDICAL GROUP | Facility: PHYSICIAN GROUP | Age: 34
End: 2024-09-20
Payer: COMMERCIAL

## 2024-09-20 VITALS
HEART RATE: 81 BPM | HEIGHT: 64 IN | DIASTOLIC BLOOD PRESSURE: 70 MMHG | BODY MASS INDEX: 31.82 KG/M2 | TEMPERATURE: 97.6 F | OXYGEN SATURATION: 8 % | WEIGHT: 186.4 LBS | SYSTOLIC BLOOD PRESSURE: 132 MMHG

## 2024-09-20 DIAGNOSIS — L72.3 SEBACEOUS CYST: ICD-10-CM

## 2024-09-20 DIAGNOSIS — L91.8 SKIN TAG: ICD-10-CM

## 2024-09-20 DIAGNOSIS — Z23 NEED FOR VACCINATION: ICD-10-CM

## 2024-09-20 PROBLEM — L98.9 SKIN LESION: Status: ACTIVE | Noted: 2024-09-20

## 2024-09-20 PROBLEM — R22.2 LUMP OF SKIN OF BACK: Status: ACTIVE | Noted: 2024-09-20

## 2024-09-20 LAB — PATHOLOGY CONSULT NOTE: NORMAL

## 2024-09-20 PROCEDURE — 88305 TISSUE EXAM BY PATHOLOGIST: CPT

## 2024-09-20 ASSESSMENT — ENCOUNTER SYMPTOMS
FEVER: 0
CHILLS: 0
SHORTNESS OF BREATH: 0

## 2024-09-20 ASSESSMENT — FIBROSIS 4 INDEX: FIB4 SCORE: 0.47

## 2024-09-20 NOTE — PROCEDURES
SHAVE BIOPSY PROCEDURE NOTE:    Location of lesion: chest  Reason for removal: large skin tag, catching on clothing    Discussed with the patient the risks, benefits and alternatives to excision of the lesion. Written consent was obtained. Immediately prior to procedure, a time out was called to verify the correct patient, procedure, equipment, support staff and site/side marked as required. Pre-procedure pain reported as 0/10.    The area was alcohol swabbed and 1 cc of 1% lidocaine without epinephrine was administered. The area was then prepped and draped in the usual sterile fashion. A shave biopsy was used to excise the lesion. The excision was approximately 0.7 cm by 0.4 cm.  The specimen was placed in a pathology jar and sent to the lab.  Hemostasis was obtained with gentle pressure & heat-based cautery.  Antibiotic ointment and bandage was applied. The patient was given wound care instructions and follow-up precautions. Post-procedure was 0/10.

## 2024-09-20 NOTE — ASSESSMENT & PLAN NOTE
Chronic, stable.  For years she has had a recurrent lump that will drain and disappear and then recur 1 to 2 months later.  Based on history it sounds like a benign sebaceous cyst.  I did offer general surgery referral for removal but she states at this time she will continue to monitor it.

## 2024-09-20 NOTE — PROGRESS NOTES
"Subjective:     CC: lump, skin lesion    HPI:   Natasha presents today with    Problem   Sebaceous Cyst    Years, at least a couple  Center of back, near bra line  It will periodically appear and it will drain with yellow, watery discharge blood  Recurs every 1-2 months     Skin Lesion    ~20 years  Between the breasts, more to the left  Mole  Initially flat, now more elevated  No color changes she can tell, but maybe lighter  No itching, only painful if she presses on it     History of Covid-19    7/2021 2/2022 7/2024 - s/p paxlovid    She has a decreased sense of smell since her infection. She also notes some altered tasted.          Health Maintenance: Completed    ROS:  Review of Systems   Constitutional:  Negative for chills and fever.   Respiratory:  Negative for shortness of breath.    Cardiovascular:  Negative for chest pain.       Objective:     Exam:  /70 (BP Location: Left arm, Patient Position: Sitting, BP Cuff Size: Adult)   Pulse 81   Temp 36.4 °C (97.6 °F) (Temporal)   Ht 1.626 m (5' 4\")   Wt 84.6 kg (186 lb 6.4 oz)   SpO2 (!) 8%   BMI 32.00 kg/m²  Body mass index is 32 kg/m².    Physical Exam  Constitutional:       Appearance: Normal appearance.   Cardiovascular:      Rate and Rhythm: Normal rate and regular rhythm.      Heart sounds: Normal heart sounds.   Pulmonary:      Effort: Pulmonary effort is normal.      Breath sounds: Normal breath sounds.   Musculoskeletal:      Cervical back: Normal range of motion and neck supple.   Lymphadenopathy:      Cervical: No cervical adenopathy.   Skin:     Comments: 7x4 mm thick skin tag with large peduncle  No asymmetry, border irregularity, color change   Neurological:      Mental Status: She is alert.               Assessment & Plan:     34 y.o. female with the following -       Assessment & Plan  Sebaceous cyst  Chronic, stable.  For years she has had a recurrent lump that will drain and disappear and then recur 1 to 2 months later.  Based " on history it sounds like a benign sebaceous cyst.  I did offer general surgery referral for removal but she states at this time she will continue to monitor it.         Skin tag  Chronic, progressing.  She reports for about 20 years there has been a flat lesion between her breasts.  It has been becoming more elevated but no other changes that she is been able to appreciate.  Exam shows a large skin tag with a large peduncle.  I offered shave biopsy and she agreed today.  Please see procedure note for details.    Orders:    Consent for AMB Surgery/Procedure    Pathology Specimen; Future    Need for vaccination  Influenza vaccine given today.  Recommended she get the COVID-19 vaccine in October, 2024 out of pharmacy.  Orders:    INFLUENZA VACCINE QUAD INJ (PF)      Referral for genetic research was offered. Patient is a participant.    Return in about 6 months (around 3/20/2025) for Annual/wellness visit.    Please note that this dictation was created using voice recognition software. I have made every reasonable attempt to correct obvious errors, but I expect that there are errors of grammar and possibly content that I did not discover before finalizing the note.

## 2024-09-20 NOTE — ASSESSMENT & PLAN NOTE
Chronic, progressing.  She reports for about 20 years there has been a flat lesion between her breasts.  It has been becoming more elevated but no other changes that she is been able to appreciate.  Exam shows a large skin tag with a large peduncle.  I offered shave biopsy and she agreed today.  Please see procedure note for details.    Orders:    Consent for AMB Surgery/Procedure    Pathology Specimen; Future

## 2024-10-14 ENCOUNTER — HOSPITAL ENCOUNTER (OUTPATIENT)
Dept: RADIOLOGY | Facility: MEDICAL CENTER | Age: 34
End: 2024-10-14
Attending: OBSTETRICS & GYNECOLOGY
Payer: COMMERCIAL

## 2024-10-14 DIAGNOSIS — N64.4 MASTODYNIA: ICD-10-CM

## 2024-10-14 PROCEDURE — G0279 TOMOSYNTHESIS, MAMMO: HCPCS

## 2024-10-14 PROCEDURE — 76642 ULTRASOUND BREAST LIMITED: CPT | Mod: RT

## 2024-10-20 ENCOUNTER — APPOINTMENT (OUTPATIENT)
Dept: URGENT CARE | Facility: PHYSICIAN GROUP | Age: 34
End: 2024-10-20
Payer: COMMERCIAL

## 2024-10-20 ENCOUNTER — OFFICE VISIT (OUTPATIENT)
Dept: URGENT CARE | Facility: PHYSICIAN GROUP | Age: 34
End: 2024-10-20
Payer: COMMERCIAL

## 2024-10-20 VITALS
WEIGHT: 186 LBS | DIASTOLIC BLOOD PRESSURE: 86 MMHG | BODY MASS INDEX: 31.76 KG/M2 | SYSTOLIC BLOOD PRESSURE: 120 MMHG | HEIGHT: 64 IN | HEART RATE: 85 BPM | TEMPERATURE: 98.1 F | RESPIRATION RATE: 16 BRPM | OXYGEN SATURATION: 96 %

## 2024-10-20 DIAGNOSIS — M10.00 ACUTE IDIOPATHIC GOUT, UNSPECIFIED SITE: ICD-10-CM

## 2024-10-20 PROCEDURE — 3074F SYST BP LT 130 MM HG: CPT | Performed by: NURSE PRACTITIONER

## 2024-10-20 PROCEDURE — 3079F DIAST BP 80-89 MM HG: CPT | Performed by: NURSE PRACTITIONER

## 2024-10-20 PROCEDURE — 99213 OFFICE O/P EST LOW 20 MIN: CPT | Performed by: NURSE PRACTITIONER

## 2024-10-20 PROCEDURE — 1125F AMNT PAIN NOTED PAIN PRSNT: CPT | Performed by: NURSE PRACTITIONER

## 2024-10-20 RX ORDER — PREDNISONE 20 MG/1
TABLET ORAL
Qty: 10 TABLET | Refills: 0 | Status: SHIPPED | OUTPATIENT
Start: 2024-10-20 | End: 2024-10-28

## 2024-10-20 ASSESSMENT — PAIN SCALES - GENERAL: PAINLEVEL: 4=SLIGHT-MODERATE PAIN

## 2024-10-20 ASSESSMENT — FIBROSIS 4 INDEX: FIB4 SCORE: 0.47

## 2024-10-23 ENCOUNTER — HOSPITAL ENCOUNTER (OUTPATIENT)
Dept: LAB | Facility: MEDICAL CENTER | Age: 34
End: 2024-10-23
Attending: FAMILY MEDICINE
Payer: COMMERCIAL

## 2024-10-23 DIAGNOSIS — M10.071 ACUTE IDIOPATHIC GOUT INVOLVING TOE OF RIGHT FOOT: ICD-10-CM

## 2024-10-23 LAB — URATE SERPL-MCNC: 7.2 MG/DL (ref 1.9–8.2)

## 2024-10-23 PROCEDURE — 84550 ASSAY OF BLOOD/URIC ACID: CPT

## 2024-10-23 PROCEDURE — 36415 COLL VENOUS BLD VENIPUNCTURE: CPT

## 2024-10-25 ASSESSMENT — ENCOUNTER SYMPTOMS
ROS SKIN COMMENTS: ERYTHEMA
TINGLING: 0
FOCAL WEAKNESS: 0
FEVER: 0
SENSORY CHANGE: 0

## 2024-10-28 ENCOUNTER — OFFICE VISIT (OUTPATIENT)
Dept: MEDICAL GROUP | Facility: PHYSICIAN GROUP | Age: 34
End: 2024-10-28
Payer: COMMERCIAL

## 2024-10-28 VITALS
BODY MASS INDEX: 31.41 KG/M2 | HEIGHT: 64 IN | DIASTOLIC BLOOD PRESSURE: 84 MMHG | WEIGHT: 184 LBS | HEART RATE: 83 BPM | OXYGEN SATURATION: 97 % | SYSTOLIC BLOOD PRESSURE: 124 MMHG | TEMPERATURE: 98 F

## 2024-10-28 DIAGNOSIS — M10.071 ACUTE IDIOPATHIC GOUT INVOLVING TOE OF RIGHT FOOT: ICD-10-CM

## 2024-10-28 PROCEDURE — 99213 OFFICE O/P EST LOW 20 MIN: CPT | Performed by: FAMILY MEDICINE

## 2024-10-28 PROCEDURE — 3074F SYST BP LT 130 MM HG: CPT | Performed by: FAMILY MEDICINE

## 2024-10-28 PROCEDURE — 3079F DIAST BP 80-89 MM HG: CPT | Performed by: FAMILY MEDICINE

## 2024-10-28 RX ORDER — INDOMETHACIN 50 MG/1
50 CAPSULE ORAL 3 TIMES DAILY
Qty: 45 CAPSULE | Refills: 0 | Status: SHIPPED | OUTPATIENT
Start: 2024-10-28

## 2024-10-28 ASSESSMENT — FIBROSIS 4 INDEX: FIB4 SCORE: 0.47

## 2025-02-03 ENCOUNTER — APPOINTMENT (OUTPATIENT)
Dept: URGENT CARE | Facility: PHYSICIAN GROUP | Age: 35
End: 2025-02-03
Payer: COMMERCIAL

## 2025-02-06 ENCOUNTER — OFFICE VISIT (OUTPATIENT)
Dept: URGENT CARE | Facility: PHYSICIAN GROUP | Age: 35
End: 2025-02-06
Payer: COMMERCIAL

## 2025-02-06 VITALS
RESPIRATION RATE: 12 BRPM | WEIGHT: 185.7 LBS | BODY MASS INDEX: 31.7 KG/M2 | OXYGEN SATURATION: 97 % | HEIGHT: 64 IN | TEMPERATURE: 99 F | DIASTOLIC BLOOD PRESSURE: 82 MMHG | HEART RATE: 87 BPM | SYSTOLIC BLOOD PRESSURE: 120 MMHG

## 2025-02-06 DIAGNOSIS — J06.9 VIRAL URI WITH COUGH: Primary | ICD-10-CM

## 2025-02-06 DIAGNOSIS — R68.89 FLU-LIKE SYMPTOMS: ICD-10-CM

## 2025-02-06 DIAGNOSIS — R11.0 NAUSEA: ICD-10-CM

## 2025-02-06 LAB
FLUAV RNA SPEC QL NAA+PROBE: NEGATIVE
FLUBV RNA SPEC QL NAA+PROBE: NEGATIVE
RSV RNA SPEC QL NAA+PROBE: NEGATIVE
SARS-COV-2 RNA RESP QL NAA+PROBE: NEGATIVE

## 2025-02-06 PROCEDURE — 3074F SYST BP LT 130 MM HG: CPT

## 2025-02-06 PROCEDURE — 99214 OFFICE O/P EST MOD 30 MIN: CPT

## 2025-02-06 PROCEDURE — 0241U POCT CEPHEID COV-2, FLU A/B, RSV - PCR: CPT

## 2025-02-06 PROCEDURE — 3079F DIAST BP 80-89 MM HG: CPT

## 2025-02-06 RX ORDER — ONDANSETRON 4 MG/1
4 TABLET, ORALLY DISINTEGRATING ORAL EVERY 6 HOURS PRN
Qty: 15 TABLET | Refills: 0 | Status: SHIPPED | OUTPATIENT
Start: 2025-02-06

## 2025-02-06 RX ORDER — ONDANSETRON 4 MG/1
4 TABLET, ORALLY DISINTEGRATING ORAL ONCE
Status: COMPLETED | OUTPATIENT
Start: 2025-02-06 | End: 2025-02-06

## 2025-02-06 RX ADMIN — ONDANSETRON 4 MG: 4 TABLET, ORALLY DISINTEGRATING ORAL at 15:48

## 2025-02-06 ASSESSMENT — ENCOUNTER SYMPTOMS
DIAPHORESIS: 0
NAUSEA: 0
PHOTOPHOBIA: 0
SORE THROAT: 1
BRUISES/BLEEDS EASILY: 0
BLURRED VISION: 0
HEADACHES: 1
CHILLS: 1
EYE DISCHARGE: 0
SHORTNESS OF BREATH: 0
WHEEZING: 0
PALPITATIONS: 0
MYALGIAS: 1
DEPRESSION: 0
STRIDOR: 0
FOCAL WEAKNESS: 0
SPUTUM PRODUCTION: 1
COUGH: 1
ABDOMINAL PAIN: 0
VOMITING: 0
DOUBLE VISION: 0
HEMOPTYSIS: 0
DIZZINESS: 0
HEARTBURN: 0
DIARRHEA: 0
FEVER: 1
EYE PAIN: 0
EYE REDNESS: 0

## 2025-02-06 ASSESSMENT — FIBROSIS 4 INDEX: FIB4 SCORE: 0.47

## 2025-02-06 NOTE — PROGRESS NOTES
Subjective:   Natasha Weston is a 34 y.o. female who presents for Flu Like Symptoms (Exposed to positive flu A, x1day fever chills boy aches, nausea, headache, sinus congestion)          I introduced myself to the patient and informed them that I am a Family Nurse Practitioner.    HPI:Brandi is a 34-year-old female with PMH of migraine with aura, seizure disorder, MDD, who comes in today c/o fever, chills, cough, nasal congestion, runny nose, malaise, body aches, nausea,   pharyngitis.  Onset was 1 day ago. Patient describes symptoms as constant. They describe the pain as headache, body aches,  sharp and scratchy throat. Aggravating factors include worse at night, cough is worse when they lay down, throat pain is exacerbated by eating, drinking, swallowing. Relieving factors include none. Treatments tried at home include  OTC Cold and Flu medicine with poor effect.  They describe their symptoms as moderate. Denies any known exposure to Covid, Flu, RSV, strep. Denies anyone else is sick at home presently. States they did get a flu shot this season, states vaccinated against Covid x none.  She denies breastfeeding or any chance she could be pregnant currently.       Review of Systems   Constitutional:  Positive for chills, fever and malaise/fatigue. Negative for diaphoresis.   HENT:  Positive for congestion and sore throat. Negative for ear discharge, ear pain, hearing loss and tinnitus.    Eyes:  Negative for blurred vision, double vision, photophobia, pain, discharge and redness.   Respiratory:  Positive for cough and sputum production. Negative for hemoptysis, shortness of breath, wheezing and stridor.    Cardiovascular:  Negative for chest pain, palpitations and leg swelling.   Gastrointestinal:  Negative for abdominal pain, diarrhea, heartburn, nausea and vomiting.   Genitourinary:  Negative for dysuria.   Musculoskeletal:  Positive for myalgias.   Skin:  Negative for rash.   Neurological:   Positive for headaches. Negative for dizziness and focal weakness.   Endo/Heme/Allergies:  Does not bruise/bleed easily.   Psychiatric/Behavioral:  Negative for depression.    All other systems reviewed and are negative.      Medications: amphetamine-dextroamphetamine Tabs  indomethacin Caps     Allergies: Keppra [levetiracetam] and Mushroom extract complex    Problem List: does not have any pertinent problems on file.    Surgical History:  Past Surgical History:   Procedure Laterality Date    MAMMOPLASTY REDUCTION Bilateral 04/25/2024    SEPTOPLASTY  11/21/2023    TX LAP,DIAGNOSTIC ABDOMEN N/A 08/12/2022    Procedure: LAPAROSCOPY;  Surgeon: Car Munguia M.D.;  Location: SURGERY SAME DAY [facility];  Service: Gynecology    SALPINGECTOMY Bilateral 08/12/2022    Procedure: SALPINGECTOMY;  Surgeon: Car Munguia M.D.;  Location: SURGERY SAME DAY [facility];  Service: Gynecology    APPENDECTOMY  2016    LEEP  01/01/2011       Past Social Hx:   reports that she has quit smoking. Her smoking use included cigarettes. She has a 0.3 pack-year smoking history. She has never used smokeless tobacco. She reports current alcohol use of about 0.6 oz of alcohol per week. She reports that she does not currently use drugs after having used the following drugs: Marijuana.     Past Family Hx:   family history includes Breast Cancer in her maternal grandmother; Cancer in her father and mother; Diabetes in her maternal grandmother; GI Disease in her sister; Heart Disease in her father; Hyperlipidemia in her father; Ovarian Cancer in her mother; Seizures in her sister.     Problem list, medications, and allergies reviewed by myself today in Epic.   I have documented what I find to be significant in regards to past medical, social, family and surgical history  in my HPI or under PMH/PSH/FH review section, otherwise it is noncontributory     Objective:     /82 (BP Location: Right arm, Patient Position: Sitting, BP Cuff Size:  "Adult)   Pulse 87   Temp 37.2 °C (99 °F) (Temporal)   Resp 12   Ht 1.626 m (5' 4\")   Wt 84.2 kg (185 lb 11.2 oz)   SpO2 97%   BMI 31.88 kg/m²     During this visit, appropriate PPE was worn, and hand hygiene was performed.    Physical Exam  Vitals reviewed.   Constitutional:       General: She is not in acute distress.     Appearance: Normal appearance. She is not toxic-appearing or diaphoretic.   HENT:      Head: Normocephalic and atraumatic.      Right Ear: Tympanic membrane, ear canal and external ear normal. There is no impacted cerumen.      Left Ear: Tympanic membrane, ear canal and external ear normal. There is no impacted cerumen.      Nose: Congestion and rhinorrhea present.      Mouth/Throat:      Mouth: Mucous membranes are moist.      Pharynx: Posterior oropharyngeal erythema present. No oropharyngeal exudate.   Eyes:      General: No scleral icterus.        Right eye: No discharge.         Left eye: No discharge.      Extraocular Movements: Extraocular movements intact.      Conjunctiva/sclera: Conjunctivae normal.      Pupils: Pupils are equal, round, and reactive to light.   Cardiovascular:      Rate and Rhythm: Normal rate and regular rhythm.      Heart sounds: Normal heart sounds. No murmur heard.     No friction rub. No gallop.   Pulmonary:      Effort: No respiratory distress.      Breath sounds: Normal breath sounds. No stridor. No wheezing, rhonchi or rales.   Chest:      Chest wall: No tenderness.   Abdominal:      General: Bowel sounds are normal. There is no distension.      Palpations: Abdomen is soft.   Genitourinary:     Comments: Deferred  Musculoskeletal:         General: Normal range of motion.      Cervical back: Normal range of motion. No rigidity or tenderness.   Lymphadenopathy:      Cervical: No cervical adenopathy.   Skin:     General: Skin is warm and dry.      Capillary Refill: Capillary refill takes 2 to 3 seconds.      Coloration: Skin is not jaundiced or pale. "   Neurological:      General: No focal deficit present.      Mental Status: She is alert and oriented to person, place, and time. Mental status is at baseline.   Psychiatric:         Mood and Affect: Mood normal.         Behavior: Behavior normal.         Thought Content: Thought content normal.         Judgment: Judgment normal.       Lab Results/POC Test Results   Results for orders placed or performed in visit on 02/06/25   POCT CoV-2, Flu A/B, RSV by PCR    Collection Time: 02/06/25  5:35 PM   Result Value Ref Range    SARS-CoV-2 by PCR Negative Negative, Invalid    Influenza virus A RNA Negative Negative, Invalid    Influenza virus B, PCR Negative Negative, Invalid    RSV, PCR Negative Negative, Invalid             Assessment/Plan:     Diagnosis and associated orders:     1. Viral URI with cough        2. Flu-like symptoms  POCT CoV-2, Flu A/B, RSV by PCR      3. Nausea  ondansetron (Zofran ODT) dispertab 4 mg    ondansetron (ZOFRAN ODT) 4 MG TABLET DISPERSIBLE         Comments/MDM:     1. Flu-like symptoms  - POCT CoV-2, Flu A/B, RSV by PCR    2. Nausea  - ondansetron (Zofran ODT) dispertab 4 mg  - ondansetron (ZOFRAN ODT) 4 MG TABLET DISPERSIBLE; Take 1 Tablet by mouth every 6 hours as needed for Nausea/Vomiting for up to 15 doses.  Dispense: 15 Tablet; Refill: 0    3. Viral URI with cough (Primary)    I discussed with patient I will call them only if PCR testing in clinic today is positive and we need to discuss further treatment otherwise treatment will be the supportive care measures we discussed in clinic today.  [facility].  They state they understand and are agreeable.  We discussed viral versus bacterial infection, and I informed patient that they have a self-limiting viral URI at this time and antibiotics are not an effective treatment for this.    I instructed them that the management for this is supportive care-we discussed cough/deep breathing  exercises, drink plenty of fluids, get plenty of rest, try a humidifier in bedroom at night to help keep mucous membranes moist during sleep, gargle with salt water/honey/OTC Cepacol lozenges to help ease sore throat.    I instr patient they may use OTC cold and flu meds to treat symptoms  (caution regarding checking ingredients as some meds contain tylenol); may use tylenol alternated with ibuprofen (if not allergic or contraindicated) for pain/fever - may take tylenol 1000mg every 6 hours, do not exceed 3000 mg in 24 hours; ibuprofen (if not contraindicated) start with lowest effective dose, 200mg every 8 hours, may increase to up to 400 mg every 8 hours if needed, take with food.  Doses in excess of 400 mg have not being shown to increase therapeutic effect however do increase propensity for side effects/complications.  Patient was instructed that they should feel better in 7-10 days, (but some viral illnesses can last 2 weeks or longer, with residual cough possible for over a month) but to return to clinic if symptoms do not improve or worsen after 10-14 days.   We did discuss red flags and when to return to urgent care versus when to go to the emergency room and strict ER precautions were given.    I did print written instructions for patient, and did go over the diagnosis including differentials, and side effects of each medication with them and answer their questions to the best of my ability.   Advised the patient to follow-up with the primary care physician for recheck, reevaluation, and consideration of further management.  Strict ER precautions discussed for any  chest pain, difficulty breathing, difficulty swallowing, wheezing, stridor, or drooling, fever that does not respond to ibuprofen and Tylenol, inability to tolerate fluids, dehydration, lethargy.    Patient states they have good understanding and they are agreeable with the plan of care.            Pt is clinically stable at today's acute urgent  care visit. Vital signs are normal and reassuring.  No acute distress noted. Appropriate for outpatient management at this time.        I personally reviewed prior external notes and test results pertinent to today's visit.  I have independently reviewed and interpreted all diagnostics ordered during this urgent care acute visit.        Please note that this dictation was created using voice recognition software. I have made a reasonable attempt to correct obvious errors, but I expect that there are errors of grammar and possibly content that I did not discover before finalizing the note.    This note was electronically signed by Ki BRYANT, MARTIN, HELLEN, VENESSA

## 2025-02-16 ENCOUNTER — OFFICE VISIT (OUTPATIENT)
Dept: URGENT CARE | Facility: PHYSICIAN GROUP | Age: 35
End: 2025-02-16
Payer: COMMERCIAL

## 2025-02-16 ENCOUNTER — APPOINTMENT (OUTPATIENT)
Dept: RADIOLOGY | Facility: IMAGING CENTER | Age: 35
End: 2025-02-16
Attending: FAMILY MEDICINE
Payer: COMMERCIAL

## 2025-02-16 ENCOUNTER — RESULTS FOLLOW-UP (OUTPATIENT)
Dept: URGENT CARE | Facility: PHYSICIAN GROUP | Age: 35
End: 2025-02-16

## 2025-02-16 VITALS
HEART RATE: 99 BPM | WEIGHT: 179.5 LBS | TEMPERATURE: 99.8 F | SYSTOLIC BLOOD PRESSURE: 140 MMHG | OXYGEN SATURATION: 100 % | DIASTOLIC BLOOD PRESSURE: 74 MMHG | HEIGHT: 64 IN | BODY MASS INDEX: 30.64 KG/M2 | RESPIRATION RATE: 12 BRPM

## 2025-02-16 DIAGNOSIS — R05.2 SUBACUTE COUGH: ICD-10-CM

## 2025-02-16 PROCEDURE — 3077F SYST BP >= 140 MM HG: CPT | Performed by: FAMILY MEDICINE

## 2025-02-16 PROCEDURE — 3078F DIAST BP <80 MM HG: CPT | Performed by: FAMILY MEDICINE

## 2025-02-16 PROCEDURE — 71046 X-RAY EXAM CHEST 2 VIEWS: CPT | Mod: TC | Performed by: RADIOLOGY

## 2025-02-16 PROCEDURE — 99214 OFFICE O/P EST MOD 30 MIN: CPT | Performed by: FAMILY MEDICINE

## 2025-02-16 RX ORDER — METHYLPREDNISOLONE 4 MG/1
TABLET ORAL
Qty: 21 TABLET | Refills: 0 | Status: SHIPPED | OUTPATIENT
Start: 2025-02-16

## 2025-02-16 RX ORDER — AZITHROMYCIN 250 MG/1
TABLET, FILM COATED ORAL
Qty: 6 TABLET | Refills: 0 | Status: SHIPPED | OUTPATIENT
Start: 2025-02-16 | End: 2025-02-16

## 2025-02-16 RX ORDER — BENZONATATE 200 MG/1
200 CAPSULE ORAL 3 TIMES DAILY PRN
Qty: 45 CAPSULE | Refills: 0 | Status: SHIPPED | OUTPATIENT
Start: 2025-02-16

## 2025-02-16 RX ORDER — ALBUTEROL SULFATE 90 UG/1
2 INHALANT RESPIRATORY (INHALATION) EVERY 6 HOURS PRN
Qty: 8.5 G | Refills: 0 | Status: SHIPPED | OUTPATIENT
Start: 2025-02-16

## 2025-02-16 RX ORDER — DESVENLAFAXINE 25 MG/1
TABLET, EXTENDED RELEASE ORAL
COMMUNITY
Start: 2025-02-07

## 2025-02-16 RX ORDER — DEXTROAMPHETAMINE SACCHARATE, AMPHETAMINE ASPARTATE MONOHYDRATE, DEXTROAMPHETAMINE SULFATE AND AMPHETAMINE SULFATE 7.5; 7.5; 7.5; 7.5 MG/1; MG/1; MG/1; MG/1
CAPSULE, EXTENDED RELEASE ORAL
COMMUNITY
Start: 2025-02-12

## 2025-02-16 RX ORDER — DEXTROAMPHETAMINE SACCHARATE, AMPHETAMINE ASPARTATE MONOHYDRATE, DEXTROAMPHETAMINE SULFATE AND AMPHETAMINE SULFATE 5; 5; 5; 5 MG/1; MG/1; MG/1; MG/1
CAPSULE, EXTENDED RELEASE ORAL
COMMUNITY
Start: 2025-01-10

## 2025-02-16 ASSESSMENT — FIBROSIS 4 INDEX: FIB4 SCORE: 0.47

## 2025-02-16 NOTE — PROGRESS NOTES
"Chief Complaint   Patient presents with    Cough     Fever, chest hurts, back pain, congestion, concern for pneumonia, x9 days          Cough  This is a new problem. The current episode started 1 wk ago. The problem has been unchanged. The problem occurs constantly. The cough is productive. Associated symptoms include : sob, wheezing, headaches, chills, muscle aches, fever.    Denies sinus pain or nasal d/c.       Pertinent negatives include no   nausea, vomiting, diarrhea, sweats, weight loss. Nothing aggravates the symptoms.  Patient has tried nothing for the symptoms. There is no history of asthma.        Past Medical History:   Diagnosis Date    Acute nasopharyngitis     Mild cold symptoms starting 7/25/22, stuffy nose and cough denies having a fever.  Negative home COVID test 7/28/22.  7/29/22 - states as of today has a dry cough will notify Surgeon if symptoms haven't resolved in the next few days.    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 \"Anxiety attacks\" - no recent problems, no medication.    Seasonal allergies          Social History     Tobacco Use    Smoking status: Former     Current packs/day: 0.05     Average packs/day: 0.1 packs/day for 5.0 years (0.3 ttl pk-yrs)     Types: Cigarettes    Smokeless tobacco: Never   Vaping Use    Vaping status: Former   Substance Use Topics    Alcohol use: Yes     Alcohol/week: 0.6 oz     Types: 1 Standard drinks or equivalent per week     Comment: Socially    Drug use: Not Currently     Types: Marijuana           Family History   Problem Relation Age of Onset    Cancer Mother         cervical, mid-30s    Ovarian Cancer Mother     " Unfortunately the NP does not do this. It has to be a specific problem. I have put in a referral to Podiatry and the clinics are open for appts.    She might check if the facility has a podiatrist who goes to the facility.   "    mid-30s    Cancer Father         bone cancer    Hyperlipidemia Father     Heart Disease Father         MI x2    Seizures Sister     GI Disease Sister         \"intestinal problem\"    Breast Cancer Maternal Grandmother     Diabetes Maternal Grandmother     Colorectal Cancer Neg Hx     Peritoneal Cancer Neg Hx     Tubal Cancer Neg Hx                     Review of Systems   Constitutional: positive for fever and chills  HENT: negative for otalgia, sore throat  Cardiovascular - denies chest pain or dyspnea  Respiratory: Positive for cough.  .  Negative for wheezing.    Neurological: Negative for headaches, dizziness   GI - denies nausea, vomiting or diarrhea   - denies dysuria, discharge  Psych - denies depression, anxiety  Neuro - denies numbness or tingling.   10 point ROS otherwise negative, except per HPI             Objective:     BP (!) 140/74 (BP Location: Left arm, Patient Position: Sitting, BP Cuff Size: Adult)   Pulse 99   Temp 37.7 °C (99.8 °F) (Temporal)   Resp 12   Ht 1.626 m (5' 4\")   Wt 81.4 kg (179 lb 8 oz)   SpO2 100%       Physical Exam   Constitutional: patient is oriented to person, place, and time. Patient appears well-developed and well-nourished. No distress.   HENT:   Head: Normocephalic and atraumatic.   Right Ear: External ear normal.   Left Ear: External ear normal.   TMs normal  Nose: Mucosal edema  present. Right sinus exhibits no maxillary sinus tenderness. Left sinus exhibits no maxillary sinus tenderness.   Mouth/Throat: Mucous membranes are normal. No oral lesions.  No posterior pharyngeal erythema.  No oropharyngeal exudate or posterior oropharyngeal edema.   Eyes: Conjunctivae and EOM are normal. Pupils are equal, round, and reactive to light. Right eye exhibits no discharge. Left eye exhibits no discharge. No scleral icterus.   Neck: Normal range of motion. Neck supple. No tracheal deviation present.   Cardiovascular: Normal rate, regular rhythm and normal heart sounds.  " Exam reveals no friction rub.    Pulmonary/Chest: Effort normal. No respiratory distress. Patient has no wheezes or rhonchi. Patient has no rales.    Musculoskeletal:  exhibits no edema.   Lymphadenopathy:     Patient has no cervical adenopathy.      Neurological: patient is alert and oriented to person, place, and time.   Skin: Skin is warm and dry. No rash noted. No erythema.   Psychiatric: patient  has a normal mood and affect.  behavior is normal.   Nursing note and vitals reviewed.      Details    Reading Physician Reading Date Result Priority   Lino Butler M.D.  [phone]     2/16/2025      Narrative & Impression     2/16/2025 10:06 AM     HISTORY/REASON FOR EXAM:  cough; Cough     TECHNIQUE/EXAM DESCRIPTION: Two views of the chest.     COMPARISON:  7/30/2023.     FINDINGS: No suspicious lung abnormality. No pleural effusion or pneumothorax. No suspicious bone abnormality evident.        IMPRESSION:        1. Normal chest.              Exam Ended: 02/16/25 10:13 AM Last Resulted: 02/16/25 11:17 AM       Assesment/Plan:       1. Subacute cough     Chest x-ray was personally interpreted and reviewed. No acute cardiopulmonary findings. No pulmonary infiltrates or densities. Cardiac silhouette is normal. No hemidiaphragm elevation. No bony abnormalities.    - methylPREDNISolone (MEDROL DOSEPAK) 4 MG Tablet Therapy Pack; Follow schedule on package instructions.  Dispense: 21 Tablet; Refill: 0     - benzonatate (TESSALON) 200 MG capsule; Take 1 Capsule by mouth 3 times a day as needed for Cough.  Dispense: 45 Capsule; Refill: 0  - albuterol 108 (90 Base) MCG/ACT Aero Soln inhalation aerosol; Inhale 2 Puffs every 6 hours as needed for Shortness of Breath.  Dispense: 8.5 g; Refill: 0    Differential diagnosis, natural history, supportive care, and indications for immediate follow-up discussed. All questions answered. Patient agrees with the plan of care.     Follow-up as needed if symptoms worsen or fail to  improve to PCP, Urgent care or Emergency Room.     I have personally reviewed prior external notes and test results pertinent to today's visit.  I have independently reviewed and interpreted all diagnostics ordered during this urgent care acute visit.

## 2025-03-05 ENCOUNTER — HOSPITAL ENCOUNTER (EMERGENCY)
Facility: MEDICAL CENTER | Age: 35
End: 2025-03-05
Attending: EMERGENCY MEDICINE
Payer: COMMERCIAL

## 2025-03-05 ENCOUNTER — APPOINTMENT (OUTPATIENT)
Dept: RADIOLOGY | Facility: MEDICAL CENTER | Age: 35
End: 2025-03-05
Attending: EMERGENCY MEDICINE
Payer: COMMERCIAL

## 2025-03-05 ENCOUNTER — PHARMACY VISIT (OUTPATIENT)
Dept: PHARMACY | Facility: MEDICAL CENTER | Age: 35
End: 2025-03-05
Payer: COMMERCIAL

## 2025-03-05 VITALS
OXYGEN SATURATION: 97 % | BODY MASS INDEX: 30.37 KG/M2 | SYSTOLIC BLOOD PRESSURE: 145 MMHG | TEMPERATURE: 99.5 F | RESPIRATION RATE: 14 BRPM | HEART RATE: 82 BPM | DIASTOLIC BLOOD PRESSURE: 97 MMHG | WEIGHT: 177.89 LBS | HEIGHT: 64 IN

## 2025-03-05 DIAGNOSIS — J06.9 UPPER RESPIRATORY TRACT INFECTION, UNSPECIFIED TYPE: ICD-10-CM

## 2025-03-05 DIAGNOSIS — J40 BRONCHITIS: Primary | ICD-10-CM

## 2025-03-05 DIAGNOSIS — I10 HYPERTENSION, UNSPECIFIED TYPE: ICD-10-CM

## 2025-03-05 DIAGNOSIS — R05.1 ACUTE COUGH: ICD-10-CM

## 2025-03-05 LAB
EKG IMPRESSION: NORMAL
FLUAV RNA SPEC QL NAA+PROBE: NEGATIVE
FLUBV RNA SPEC QL NAA+PROBE: NEGATIVE
RSV RNA SPEC QL NAA+PROBE: NEGATIVE
SARS-COV-2 RNA RESP QL NAA+PROBE: NOTDETECTED

## 2025-03-05 PROCEDURE — RXMED WILLOW AMBULATORY MEDICATION CHARGE: Performed by: EMERGENCY MEDICINE

## 2025-03-05 PROCEDURE — 700111 HCHG RX REV CODE 636 W/ 250 OVERRIDE (IP): Performed by: EMERGENCY MEDICINE

## 2025-03-05 PROCEDURE — A9270 NON-COVERED ITEM OR SERVICE: HCPCS | Performed by: EMERGENCY MEDICINE

## 2025-03-05 PROCEDURE — 71045 X-RAY EXAM CHEST 1 VIEW: CPT

## 2025-03-05 PROCEDURE — 93005 ELECTROCARDIOGRAM TRACING: CPT | Mod: TC

## 2025-03-05 PROCEDURE — 700102 HCHG RX REV CODE 250 W/ 637 OVERRIDE(OP): Performed by: EMERGENCY MEDICINE

## 2025-03-05 PROCEDURE — 99283 EMERGENCY DEPT VISIT LOW MDM: CPT

## 2025-03-05 PROCEDURE — 0241U HCHG SARS-COV-2 COVID-19 NFCT DS RESP RNA 4 TRGT ED POC: CPT

## 2025-03-05 PROCEDURE — 93005 ELECTROCARDIOGRAM TRACING: CPT | Mod: TC | Performed by: EMERGENCY MEDICINE

## 2025-03-05 RX ORDER — AZITHROMYCIN 250 MG/1
500 TABLET, FILM COATED ORAL ONCE
Status: COMPLETED | OUTPATIENT
Start: 2025-03-05 | End: 2025-03-05

## 2025-03-05 RX ORDER — BENZONATATE 100 MG/1
100 CAPSULE ORAL 3 TIMES DAILY PRN
Qty: 15 CAPSULE | Refills: 0 | Status: SHIPPED | OUTPATIENT
Start: 2025-03-05

## 2025-03-05 RX ORDER — PREDNISONE 20 MG/1
60 TABLET ORAL ONCE
Status: COMPLETED | OUTPATIENT
Start: 2025-03-05 | End: 2025-03-05

## 2025-03-05 RX ORDER — PREDNISONE 20 MG/1
40 TABLET ORAL DAILY
Qty: 10 TABLET | Refills: 0 | Status: SHIPPED | OUTPATIENT
Start: 2025-03-05 | End: 2025-03-10

## 2025-03-05 RX ADMIN — AZITHROMYCIN DIHYDRATE 500 MG: 250 TABLET ORAL at 17:39

## 2025-03-05 RX ADMIN — PREDNISONE 60 MG: 20 TABLET ORAL at 17:53

## 2025-03-05 ASSESSMENT — COGNITIVE AND FUNCTIONAL STATUS - GENERAL
MOBILITY SCORE: 24
DAILY ACTIVITIY SCORE: 24
SUGGESTED CMS G CODE MODIFIER DAILY ACTIVITY: CH
SUGGESTED CMS G CODE MODIFIER MOBILITY: CH

## 2025-03-05 ASSESSMENT — FIBROSIS 4 INDEX: FIB4 SCORE: 0.47

## 2025-03-06 NOTE — ED NOTES
D/C home with written and verbal instructions re: Rx, activity, f/u.  Verbalizes understanding.  Ambulated out with steady gait.

## 2025-03-06 NOTE — ED PROVIDER NOTES
ED Provider Note    Scribed for Edmund Horowitz by Laura Silvestre. 3/5/2025  5:18 PM    Primary care provider: Tamara Coffman M.D.  Means of arrival: Walk-in  History obtained from: Patient  History limited by: None    CHIEF COMPLAINT  Chief Complaint   Patient presents with    Cough     Ambulates to triage reports productive cough x 4 weeks. Was seen over at urgent care few weeks ago for same. Was prescribed with steroids, albuterol and tessalon lorna and z-pack. Pt here due to improvement symptoms. States chest hurts when coughing.     EXTERNAL RECORDS REVIEWED  Other Patient was seen at urgent care on 2/6/25. Viral swab was negative, but viral etiology was suspected. She was seen at urgent care again on 2/16/25. Chest x-ray was normal. She was prescribed a Medrol Dosepak, Tessalon, and albuterol.       HPI/ROS  LIMITATION TO HISTORY   Select: : None    HPI  Natasha Weston is a 34 y.o. female who presents to the Emergency Department for productive cough onset one month ago. The patient endorses additional fever with a maximum temperature of 103.6°F, ear pain, and body aches which all resolved after about one week, however her cough and shortness of breath have persisted. She denies any congestion. Patient reports she was seen at urgent care at the onset of her symptoms and diagnosed with viral illness; she was seen there again two weeks ago after her cough persisted and was prescribed steroids and albuterol for concerns of pneumonia. She denies smoking, drug, or alcohol use.    REVIEW OF SYSTEMS  As above, all other systems reviewed and are negative.   See HPI for further details.     PAST MEDICAL HISTORY   has a past medical history of Acute nasopharyngitis, Anesthesia, Bronchitis, Kidney disease (2011), Localization-related partial epilepsy with complex partial seizures (HCC) (05/05/2017), Migraine without aura and without status migrainosus, not intractable (05/05/2017), Psychiatric  "problem (07/29/2022), and Seasonal allergies.  SURGICAL HISTORY   has a past surgical history that includes leep (01/01/2011); appendectomy (2016); lap,diagnostic abdomen (N/A, 08/12/2022); salpingectomy (Bilateral, 08/12/2022); septoplasty (11/21/2023); and mammoplasty reduction (Bilateral, 04/25/2024).  SOCIAL HISTORY  Social History     Tobacco Use    Smoking status: Former     Current packs/day: 0.05     Average packs/day: 0.1 packs/day for 5.0 years (0.3 ttl pk-yrs)     Types: Cigarettes    Smokeless tobacco: Never   Vaping Use    Vaping status: Former   Substance Use Topics    Alcohol use: Yes     Alcohol/week: 0.6 oz     Types: 1 Standard drinks or equivalent per week     Comment: Socially    Drug use: Not Currently     Types: Marijuana      Social History     Substance and Sexual Activity   Drug Use Not Currently    Types: Marijuana     FAMILY HISTORY  Family History   Problem Relation Age of Onset    Cancer Mother         cervical, mid-30s    Ovarian Cancer Mother         mid-30s    Cancer Father         bone cancer    Hyperlipidemia Father     Heart Disease Father         MI x2    Seizures Sister     GI Disease Sister         \"intestinal problem\"    Breast Cancer Maternal Grandmother     Diabetes Maternal Grandmother     Colorectal Cancer Neg Hx     Peritoneal Cancer Neg Hx     Tubal Cancer Neg Hx      CURRENT MEDICATIONS  Home Medications       Reviewed by Alice Khanna R.N. (Registered Nurse) on 03/05/25 at 1647  Med List Status: Partial     Medication Last Dose Status   albuterol 108 (90 Base) MCG/ACT Aero Soln inhalation aerosol  Active   amphetamine-dextroamphetamine (ADDERALL XR) 20 MG per XR capsule  Active   amphetamine-dextroamphetamine (ADDERALL) 10 MG Tab  Active   amphetamine-dextroamphetamine ER (ADDERALL XR) 30 MG XR capsule  Active   benzonatate (TESSALON) 200 MG capsule  Active   Desvenlafaxine Succinate ER 25 MG TABLET SR 24 HR  Active   indomethacin (INDOCIN) 50 MG Cap  Active " "  methylPREDNISolone (MEDROL DOSEPAK) 4 MG Tablet Therapy Pack  Active   ondansetron (ZOFRAN ODT) 4 MG TABLET DISPERSIBLE  Active                  ALLERGIES  Allergies   Allergen Reactions    Keppra [Levetiracetam] Unspecified     Severe Depression suicidal thoughts    Mushroom Extract Complex Anaphylaxis     Pt reports her throat swells       PHYSICAL EXAM    VITAL SIGNS:   Vitals:    03/05/25 1637   BP: (!) 153/110   Pulse: 98   Resp: 14   Temp: 37.1 °C (98.7 °F)   TempSrc: Temporal   SpO2: 97%   Weight: 80.7 kg (177 lb 14.2 oz)   Height: 1.626 m (5' 4\")     Vitals: My interpretation: hypertensive, not tachycardic, afebrile, not hypoxic    Reinterpretation of vitals: Unchanged    PE:   Gen: sitting comfortably, speaking clearly, appears in no acute distress   ENT: Mucous membranes moist, posterior pharynx clear, uvula midline, nares patent bilaterally   Neck: Supple, FROM  Pulmonary: Lungs are clear to auscultation bilaterally. No tachypnea  CV:  RRR, no murmur appreciated, pulses 2+ in both upper and lower extremities  Abdomen: soft, NT/ND; no rebound/guarding  : no CVA or suprapubic tenderness   Neuro: A&Ox4 (person, place, time, situation), speech fluent, gait steady, no focal deficits appreciated  Skin: No rash or lesions.  No pallor or jaundice.  No cyanosis.  Warm and dry.     DIAGNOSTIC STUDIES / PROCEDURES    LABS  Results for orders placed or performed during the hospital encounter of 03/05/25   POC CoV-2, FLU A/B, RSV by PCR    Collection Time: 03/05/25  5:02 PM   Result Value Ref Range    POC Influenza A RNA, PCR Negative Negative    POC Influenza B RNA, PCR Negative Negative    POC RSV, by PCR Negative Negative    POC SARS-CoV-2, PCR NotDetected NotDetected   EKG    Collection Time: 03/05/25  5:18 PM   Result Value Ref Range    Report       [facility] Emergency Dept.    Test Date:  2025-03-05  Pt Name:    MERLY FERRARI      Department: ER  MRN:        [id number]             "          Room:  Gender:     Female                       Technician: [id number]  :        1990                   Requested By:ER TRIAGE PROTOCOL  Order #:    [id number]                    Reading MD: Edmund Horowitz    Measurements  Intervals                                Axis  Rate:       92                           P:          18  IA:         124                          QRS:        30  QRSD:       88                           T:          0  QT:         353  QTc:        437    Interpretive Statements  Sinus rhythm  Probable left atrial enlargement  Borderline T abnormalities, diffuse leads  Baseline wander in lead(s) V3,V4,V5,V6  Compared to ECG 2023 19:09:47  T-wave abnormality now present  Electronically Signed On 2025 17:18:19 PST by Edmund Horowitz        All labs reviewed by me. Labs were compared to prior labs if they were available. Significant for flu COVID and RSV negative    RADIOLOGY  I have independently interpreted the diagnostic imaging associated with this visit and am waiting the final reading from the radiologist.   My preliminary interpretation is a follows: On my independent interpretation patient has no new significant cardiomegaly or focal consolidative process on CXR.     Radiologist interpretation is as follows:  DX-CHEST-PORTABLE (1 VIEW)   Final Result      No acute cardiopulmonary abnormality.        COURSE & MEDICAL DECISION MAKING  Nursing notes, VS, PMSFHx, labs, imaging, EKG reviewed in chart.    ED Observation Status? No; Patient does not meet criteria for ED Observation.     Ddx: Pertussis, croup, bronchitis, bronchiolitis, pneumonia    MDM: 5:18 PM Natasha Weston is a 34 y.o. female who presented with evaluation of persistent and bothersome barking like cough has been ongoing for about 4 weeks.  Initially started with a flulike illness 4 weeks ago with fever, body aches and typical flulike symptoms.  All of these resolved other than persistent  barking nonproductive cough.  She is otherwise fairly healthy for her age.  No ankle drug or tobacco use.  No known sick contacts.  She tested negative for flu, RSV and pneumonia with a negative chest x-ray about 2 and half weeks ago.  She underwent a course of albuterol, and steroids which helped a little bit but symptoms came back and she stopped taking them.  Upon arrival here vital signs are normal with a mild hypertension.  She had a negative flu COVID and RSV test.  Chest x-ray on my independent interpretation shows no focal consolidation, radiologist agrees.  She an EKG done per triage protocol for pain in the chest with coughing, which is negative and showed normal sinus rhythm.  Her physical exam is benign and lungs are clear.  I do think she likely has bronchitis, a viral nature, as she has a very distinct barking cough on evaluation.  I think she may benefit from a another dose of steroids were started here which will be sent home with a 5-day course of burst steroids.  Overall patient is stable and although blood work is considered, not necessary at this time.  Considered antibiotics as well but this is likely viral in nature and patient is afebrile, with clear lungs and a normal chest x-ray.  She verbalized understanding strict return precautions outpatient follow plan and is amenable.    ADDITIONAL PROBLEM LIST AND DISPOSITION    I have discussed management of the patient with the following physicians and MINESH's: None    Discussion of management with other QHP or appropriate source(s): None     Escalation of care considered, and ultimately not performed:IV fluids, Laboratory analysis, and diagnostic imaging    Barriers to care at this time, including but not limited to:  None .     Decision tools and prescription drugs considered including, but not limited to:  Steroid prescription .    FINAL IMPRESSION  1. Bronchitis Acute   2. Acute cough Acute   3. Upper respiratory tract infection, unspecified type  Acute   4. Hypertension, unspecified type Acute      I, Laura Silvestre (Jessica), am scribing for, and in the presence of, Edmund Horowitz.    Electronically signed by: Laura Silvestre (Jessica), 3/5/2025    IEdmund personally performed the services described in this documentation, as scribed by Laura Silvestre in my presence, and it is both accurate and complete.    The note accurately reflects work and decisions made by me.  Edmund Horowitz  3/5/2025  5:54 PM

## 2025-03-06 NOTE — ED NOTES
Chief Complaint   Patient presents with    Cough     Ambulates to triage reports productive cough x 4 weeks. Was seen over at urgent care few weeks ago for same. Was prescribed with steroids, albuterol and tessalon lorna and z-pack. Pt here due to improvement symptoms. States chest hurts when coughing.     Also reports pain in upper back worse when coughing and inspiration. Speaking in full sentences. Denies cardiac history.  Nasal swab collected. Poc in process. Mask applied

## 2025-03-06 NOTE — DISCHARGE INSTRUCTIONS
Please read the handout on acute bronchitis.  This likely occurs after you have a virus and it affects the upper airways and can cause cough for 1 to 2 months.  Unfortunately there is no easy way to treat this, we will try another round of steroids deceiving calm down some of the inflammation as well as the Tessalon Perles to help with coughing.  The chest x-ray is normal.  EKG is normal.  And her lungs are clear.  Your oxygen level is normal.  These are all reassuring.  I would you follow-up with your primary care team for further evaluation and treatment.  Come back if there is any worsening symptoms.  Thank you for coming in today.

## 2025-03-27 ENCOUNTER — APPOINTMENT (OUTPATIENT)
Dept: MEDICAL GROUP | Facility: PHYSICIAN GROUP | Age: 35
End: 2025-03-27
Payer: COMMERCIAL

## 2025-05-22 ENCOUNTER — OFFICE VISIT (OUTPATIENT)
Dept: URGENT CARE | Facility: CLINIC | Age: 35
End: 2025-05-22
Payer: COMMERCIAL

## 2025-05-22 VITALS
OXYGEN SATURATION: 99 % | HEART RATE: 75 BPM | WEIGHT: 182 LBS | TEMPERATURE: 98.6 F | HEIGHT: 64 IN | RESPIRATION RATE: 16 BRPM | DIASTOLIC BLOOD PRESSURE: 82 MMHG | SYSTOLIC BLOOD PRESSURE: 132 MMHG | BODY MASS INDEX: 31.07 KG/M2

## 2025-05-22 DIAGNOSIS — R68.83 CHILLS: ICD-10-CM

## 2025-05-22 DIAGNOSIS — T36.95XA ANTIBIOTIC-INDUCED YEAST INFECTION: ICD-10-CM

## 2025-05-22 DIAGNOSIS — R11.2 NAUSEA AND VOMITING, UNSPECIFIED VOMITING TYPE: ICD-10-CM

## 2025-05-22 DIAGNOSIS — B37.9 ANTIBIOTIC-INDUCED YEAST INFECTION: ICD-10-CM

## 2025-05-22 DIAGNOSIS — H66.001 NON-RECURRENT ACUTE SUPPURATIVE OTITIS MEDIA OF RIGHT EAR WITHOUT SPONTANEOUS RUPTURE OF TYMPANIC MEMBRANE: Primary | ICD-10-CM

## 2025-05-22 RX ORDER — ONDANSETRON 4 MG/1
4 TABLET, ORALLY DISINTEGRATING ORAL EVERY 6 HOURS PRN
Qty: 15 TABLET | Refills: 0 | Status: SHIPPED | OUTPATIENT
Start: 2025-05-22

## 2025-05-22 RX ORDER — ONDANSETRON 4 MG/1
4 TABLET, ORALLY DISINTEGRATING ORAL ONCE
Status: COMPLETED | OUTPATIENT
Start: 2025-05-22 | End: 2025-05-22

## 2025-05-22 RX ORDER — FLUCONAZOLE 150 MG/1
150 TABLET ORAL DAILY
Qty: 1 TABLET | Refills: 0 | Status: SHIPPED | OUTPATIENT
Start: 2025-05-22

## 2025-05-22 RX ADMIN — ONDANSETRON 4 MG: 4 TABLET, ORALLY DISINTEGRATING ORAL at 10:37

## 2025-05-22 ASSESSMENT — FIBROSIS 4 INDEX: FIB4 SCORE: 0.47

## 2025-05-22 NOTE — PROGRESS NOTES
"Subjective:   Natasha Weston is a 34 y.o. female who presents for Otalgia (Sinus headache, allergies, R ear, x few days)      HPI:    Patient presents to urgent care with concerns of right ear pain x 2 days  Reports persistent sinus symptoms over the past week to week an a half. Thought she was initially experiencing seasonal allergies but have worsened in the past couple of days.   Symptoms include rhinorrhea, nasal congestion, sore throat, dry cough, chills, nausea, headache over her forehead.   Denies fever, body aches  Denies otorrhea.  Right sided ear pain radiates around her ear and to her jaw  Denies neck pain, photophobia  Having a little bit of nausea, no emesis, no diarrhea  Denies rash  Denies known sick contacts  Denies possible pregnancy, has history of bilateral salpingectomy    ROS As above in HPI    Medications:    Medications Ordered Prior to Encounter[1]     Allergies:   Keppra [levetiracetam] and Mushroom extract complex    Problem List:   Problem List[2]     Surgical History:  Past Surgical History:   Procedure Laterality Date    MAMMOPLASTY REDUCTION Bilateral 04/25/2024    SEPTOPLASTY  11/21/2023    [place] LAP,DIAGNOSTIC ABDOMEN N/A 08/12/2022    Procedure: LAPAROSCOPY;  Surgeon: Car Munguia M.D.;  Location: SURGERY SAME DAY [facility];  Service: Gynecology    SALPINGECTOMY Bilateral 08/12/2022    Procedure: SALPINGECTOMY;  Surgeon: Car Munguia M.D.;  Location: SURGERY SAME DAY [facility];  Service: Gynecology    APPENDECTOMY  2016    [facility]  01/01/2011       Past Social Hx:   Social History[3]       Problem list, medications, and allergies reviewed by myself today in Epic.     Objective:     /82   Pulse 75   Temp 37 °C (98.6 °F)   Resp 16   Ht 1.626 m (5' 4\")   Wt 82.6 kg (182 lb)   SpO2 99%   BMI 31.24 kg/m²     Physical Exam  Vitals and nursing note reviewed.   Constitutional:       General: She is not in acute distress.     Appearance: Normal appearance. She " is not ill-appearing.   HENT:      Head: Normocephalic.      Right Ear: Ear canal normal. A middle ear effusion is present. No mastoid tenderness. Tympanic membrane is erythematous and bulging.      Left Ear: Tympanic membrane and ear canal normal.      Nose: Congestion and rhinorrhea present. Rhinorrhea is clear.      Right Sinus: No maxillary sinus tenderness or frontal sinus tenderness.      Left Sinus: No maxillary sinus tenderness or frontal sinus tenderness.      Mouth/Throat:      Mouth: Mucous membranes are moist.      Pharynx: Oropharynx is clear. Uvula midline. Posterior oropharyngeal erythema and postnasal drip present. No pharyngeal swelling, oropharyngeal exudate or uvula swelling.      Tonsils: No tonsillar exudate or tonsillar abscesses.   Cardiovascular:      Rate and Rhythm: Normal rate and regular rhythm.      Heart sounds: Normal heart sounds. No murmur heard.     No friction rub. No gallop.   Pulmonary:      Effort: Pulmonary effort is normal. No respiratory distress.      Breath sounds: Normal breath sounds. No stridor. No wheezing, rhonchi or rales.   Chest:      Chest wall: No tenderness.   Abdominal:      General: Bowel sounds are normal.      Palpations: Abdomen is soft.   Musculoskeletal:      Cervical back: No rigidity or tenderness.   Lymphadenopathy:      Cervical: No cervical adenopathy.   Skin:     General: Skin is warm and dry.      Capillary Refill: Capillary refill takes less than 2 seconds.      Findings: No rash.   Neurological:      Mental Status: She is alert and oriented to person, place, and time.         Assessment/Plan:       Results for orders placed or performed in visit on 05/22/25   POCT CoV-2, Flu A/B, RSV by PCR    Collection Time: 05/22/25 10:47 AM   Result Value Ref Range    SARS-CoV-2 by PCR Negative Negative, Invalid    Influenza virus A RNA Negative Negative, Invalid    Influenza virus B, PCR Negative Negative, Invalid    RSV, PCR Negative Negative, Invalid        Diagnosis and associated orders:   1. Non-recurrent acute suppurative otitis media of right ear without spontaneous rupture of tympanic membrane  - amoxicillin-clavulanate (AUGMENTIN) 875-125 MG Tab; Take 1 Tablet by mouth 2 times a day for 7 days.  Dispense: 14 Tablet; Refill: 0    2. Antibiotic-induced yeast infection  - fluconazole (DIFLUCAN) 150 MG tablet; Take 1 Tablet by mouth every day.  Dispense: 1 Tablet; Refill: 0    3. Chills  - POCT CoV-2, Flu A/B, RSV by PCR    4. Nausea and vomiting, unspecified vomiting type  - ondansetron (Zofran ODT) dispertab 4 mg  - ondansetron (ZOFRAN ODT) 4 MG TABLET DISPERSIBLE; Take 1 Tablet by mouth every 6 hours as needed for Nausea/Vomiting for up to 15 doses.  Dispense: 15 Tablet; Refill: 0        Comments/MDM:       Patient tested negative for covid, influenza, rsv in office.  Vital signs are stable, patient is nontoxic. No signs of respiratory distress.  Supportive measures encouraged: Rest, increased oral hydration, NSAIDs/tylenol as needed per package instructions, decongestant, warm humidification, otc antihistamines, Flonase, cough suppressant as needed   Strict return to ER precautions reviewed  Follow-up with primary care as advised  Work note provided        Return to clinic or go to ED if symptoms worsen or persist. Indications for ED discussed at length. Patient/Parent/Guardian voices understanding. Follow-up with your primary care provider in 3-5 days. Red flag symptoms discussed. All side effects of medication discussed including allergic response, GI upset, tendon injury, rash, sedation etc.    Please note that this dictation was created using voice recognition software. I have made a reasonable attempt to correct obvious errors, but I expect that there are errors of grammar and possibly content that I did not discover before finalizing the note.    This note was electronically signed by MANNY Villarreal         [1]   Current Outpatient  Medications on File Prior to Visit   Medication Sig Dispense Refill    amphetamine-dextroamphetamine (ADDERALL XR) 20 MG per XR capsule TAKE 1 CAPSULE BY MOUTH ONCE DAILY IN THE MORNING FOR 30 DAYS. TO BE TAKEN WITH 10MG      amphetamine-dextroamphetamine ER (ADDERALL XR) 30 MG XR capsule TAKE 1 CAPSULE BY MOUTH ONCE DAILY IN THE MORNING FOR 30 DAYS      albuterol 108 (90 Base) MCG/ACT Aero Soln inhalation aerosol Inhale 2 Puffs every 6 hours as needed for Shortness of Breath. 8.5 g 0    amphetamine-dextroamphetamine (ADDERALL) 10 MG Tab TAKE 1 TABLET BY MOUTH DAILY IN THE MORNING AND 1 TABLET DAILY AT NOON AS DIRECTED FOR 30 DAYS FOR ADHD      benzonatate (TESSALON) 100 MG Cap Take 1 Capsule by mouth 3 times a day as needed for Cough. (Patient not taking: Reported on 5/22/2025) 15 Capsule 0    Desvenlafaxine Succinate ER 25 MG TABLET SR 24 HR TAKE 1 TABLET BY MOUTH ONCE DAILY IN THE MORNING FOR DEPRESSION FOR 30 DAYS (Patient not taking: Reported on 5/22/2025)      methylPREDNISolone (MEDROL DOSEPAK) 4 MG Tablet Therapy Pack Follow schedule on package instructions. (Patient not taking: Reported on 5/22/2025) 21 Tablet 0    indomethacin (INDOCIN) 50 MG Cap Take 1 Capsule by mouth 3 times a day. For 3-5 days. 45 Capsule 0     No current facility-administered medications on file prior to visit.   [2]   Patient Active Problem List  Diagnosis    Obesity (BMI 30-39.9)    Localization-related partial epilepsy with complex partial seizures (HCC)    Migraine without aura and without status migrainosus, not intractable    Major depressive disorder with single episode, in full remission (HCC)    Elevated fasting glucose    Family history of diabetes mellitus    History of COVID-19    Nose symptom    Breast symptom    Sebaceous cyst    Skin lesion   [3]   Social History  Tobacco Use    Smoking status: Former     Current packs/day: 0.05     Average packs/day: 0.1 packs/day for 5.0 years (0.3 ttl pk-yrs)     Types: Cigarettes     Smokeless tobacco: Never   Vaping Use    Vaping status: Former   Substance Use Topics    Alcohol use: Yes     Alcohol/week: 0.6 oz     Types: 1 Standard drinks or equivalent per week     Comment: Socially    Drug use: Not Currently     Types: Marijuana

## 2025-05-22 NOTE — LETTER
May 22, 2025    To Whom It May Concern:         This is confirmation that Natasha Weston attended her scheduled appointment with AMADEO Metcalf on 5/22/25.    Please excuse her from work until Tuesday May 27, 2025 due to illness. She may return to work sooner if she feels better.         If you have any questions please do not hesitate to call me at the phone number listed below.    Sincerely,          NOA Metcalf.  [phone]

## 2025-05-28 NOTE — Clinical Note
REFERRAL APPROVAL NOTICE         Sent on May 28, 2025                   Brandi Weston  [address] [facility]  [place] [address]                   Dear Ms. Weston,    After a careful review of the medical information and benefit coverage, [facility] has processed your referral. See below for additional details.    If applicable, you must be actively enrolled with your insurance for coverage of the authorized service. If you have any questions regarding your coverage, please contact your insurance directly.    REFERRAL INFORMATION   Referral #:  [id number]  Referred-To Department    Referred-By Provider:  OB/Gyn    Car Munguia M.D.   [facility]      [address]  [address]  [place] [address]  [phone] [address]  [place] [id number] [phone]    Referral Start Date:  05/28/2025  Referral End Date:   05/28/2026             SCHEDULING  If you do not already have an appointment, please call [phone] to make an appointment.     MORE INFORMATION  If you do not already have a [employer] account, sign up at: [email or web address]  You can access your medical information, make appointments, see lab results, billing information, and more.  If you have questions regarding this referral, please contact  the [facility] Referrals department at:             [phone]. Monday - Friday 8:00AM - 5:00PM.     Sincerely,    [facility]

## 2025-06-06 ENCOUNTER — HOSPITAL ENCOUNTER (OUTPATIENT)
Facility: MEDICAL CENTER | Age: 35
End: 2025-06-06
Attending: FAMILY MEDICINE
Payer: COMMERCIAL

## 2025-06-06 ENCOUNTER — APPOINTMENT (OUTPATIENT)
Dept: MEDICAL GROUP | Facility: PHYSICIAN GROUP | Age: 35
End: 2025-06-06
Payer: COMMERCIAL

## 2025-06-06 VITALS
HEART RATE: 76 BPM | TEMPERATURE: 98.1 F | WEIGHT: 178.4 LBS | HEIGHT: 64 IN | BODY MASS INDEX: 30.46 KG/M2 | OXYGEN SATURATION: 96 %

## 2025-06-06 DIAGNOSIS — F15.20 STIMULANT DEPENDENCE (HCC): ICD-10-CM

## 2025-06-06 DIAGNOSIS — F90.9 ATTENTION DEFICIT HYPERACTIVITY DISORDER (ADHD), UNSPECIFIED ADHD TYPE: Primary | ICD-10-CM

## 2025-06-06 DIAGNOSIS — F90.9 ATTENTION DEFICIT HYPERACTIVITY DISORDER (ADHD), UNSPECIFIED ADHD TYPE: ICD-10-CM

## 2025-06-06 PROCEDURE — G0482 DRUG TEST DEF 15-21 CLASSES: HCPCS

## 2025-06-06 PROCEDURE — 99214 OFFICE O/P EST MOD 30 MIN: CPT | Performed by: FAMILY MEDICINE

## 2025-06-06 RX ORDER — DEXTROAMPHETAMINE SACCHARATE, AMPHETAMINE ASPARTATE MONOHYDRATE, DEXTROAMPHETAMINE SULFATE AND AMPHETAMINE SULFATE 5; 5; 5; 5 MG/1; MG/1; MG/1; MG/1
20 CAPSULE, EXTENDED RELEASE ORAL EVERY MORNING
Qty: 30 CAPSULE | Refills: 0 | Status: SHIPPED | OUTPATIENT
Start: 2025-08-05 | End: 2025-09-04

## 2025-06-06 RX ORDER — DEXTROAMPHETAMINE SACCHARATE, AMPHETAMINE ASPARTATE, DEXTROAMPHETAMINE SULFATE AND AMPHETAMINE SULFATE 2.5; 2.5; 2.5; 2.5 MG/1; MG/1; MG/1; MG/1
10 TABLET ORAL DAILY
Qty: 30 TABLET | Refills: 0 | Status: SHIPPED | OUTPATIENT
Start: 2025-08-05 | End: 2025-09-04

## 2025-06-06 RX ORDER — DEXTROAMPHETAMINE SACCHARATE, AMPHETAMINE ASPARTATE MONOHYDRATE, DEXTROAMPHETAMINE SULFATE AND AMPHETAMINE SULFATE 5; 5; 5; 5 MG/1; MG/1; MG/1; MG/1
20 CAPSULE, EXTENDED RELEASE ORAL EVERY MORNING
Qty: 30 CAPSULE | Refills: 0 | Status: SHIPPED | OUTPATIENT
Start: 2025-06-06 | End: 2025-07-06

## 2025-06-06 RX ORDER — DEXTROAMPHETAMINE SACCHARATE, AMPHETAMINE ASPARTATE MONOHYDRATE, DEXTROAMPHETAMINE SULFATE AND AMPHETAMINE SULFATE 5; 5; 5; 5 MG/1; MG/1; MG/1; MG/1
20 CAPSULE, EXTENDED RELEASE ORAL EVERY MORNING
Qty: 30 CAPSULE | Refills: 0 | Status: SHIPPED | OUTPATIENT
Start: 2025-07-06 | End: 2025-08-05

## 2025-06-06 RX ORDER — DEXTROAMPHETAMINE SACCHARATE, AMPHETAMINE ASPARTATE, DEXTROAMPHETAMINE SULFATE AND AMPHETAMINE SULFATE 2.5; 2.5; 2.5; 2.5 MG/1; MG/1; MG/1; MG/1
10 TABLET ORAL DAILY
Qty: 30 TABLET | Refills: 0 | Status: SHIPPED | OUTPATIENT
Start: 2025-07-06 | End: 2025-08-05

## 2025-06-06 RX ORDER — DEXTROAMPHETAMINE SACCHARATE, AMPHETAMINE ASPARTATE, DEXTROAMPHETAMINE SULFATE AND AMPHETAMINE SULFATE 2.5; 2.5; 2.5; 2.5 MG/1; MG/1; MG/1; MG/1
10 TABLET ORAL DAILY
Qty: 30 TABLET | Refills: 0 | Status: SHIPPED | OUTPATIENT
Start: 2025-06-06 | End: 2025-07-06

## 2025-06-06 ASSESSMENT — PATIENT HEALTH QUESTIONNAIRE - PHQ9
SUM OF ALL RESPONSES TO PHQ QUESTIONS 1-9: 1
3. TROUBLE FALLING OR STAYING ASLEEP OR SLEEPING TOO MUCH: SEVERAL DAYS
SUM OF ALL RESPONSES TO PHQ9 QUESTIONS 1 AND 2: 0
1. LITTLE INTEREST OR PLEASURE IN DOING THINGS: NOT AT ALL
4. FEELING TIRED OR HAVING LITTLE ENERGY: NOT AT ALL
7. TROUBLE CONCENTRATING ON THINGS, SUCH AS READING THE NEWSPAPER OR WATCHING TELEVISION: NOT AT ALL
8. MOVING OR SPEAKING SO SLOWLY THAT OTHER PEOPLE COULD HAVE NOTICED. OR THE OPPOSITE, BEING SO FIGETY OR RESTLESS THAT YOU HAVE BEEN MOVING AROUND A LOT MORE THAN USUAL: NOT AT ALL
6. FEELING BAD ABOUT YOURSELF - OR THAT YOU ARE A FAILURE OR HAVE LET YOURSELF OR YOUR FAMILY DOWN: NOT AL ALL
2. FEELING DOWN, DEPRESSED, IRRITABLE, OR HOPELESS: NOT AT ALL
5. POOR APPETITE OR OVEREATING: NOT AT ALL
9. THOUGHTS THAT YOU WOULD BE BETTER OFF DEAD, OR OF HURTING YOURSELF: NOT AT ALL

## 2025-06-06 ASSESSMENT — FIBROSIS 4 INDEX: FIB4 SCORE: 0.47

## 2025-06-06 NOTE — PROGRESS NOTES
"Verbal consent was acquired by the patient to use [employer] ambient listening note generation during this visit     Subjective:     HPI:   History of Present Illness  The patient presents for attention deficit disorder management.    Attention Deficit Disorder  - She is currently on a regimen of Adderall XR 20 mg in the morning and Adderall 10 mg at noon, which she takes as needed, approximately 3 to 4 days per week.  - She has been on this medication for about a year, with the dosage remaining stable for the past several months.  - She reports no side effects from the 20 mg dose and finds it effective in managing her symptoms.  - She has 4 tablets remaining from her last prescription of the extended-release formulation, which was filled on 05/17/2025, and 8 to 9 tablets left from her last fill of the 10 mg dose.  - Her last dose of the extended-release formulation was this morning, and she did not take the 10 mg dose two days ago.  - She reports occasional trouble sleeping, which she attributes to the 30 mg dose she received by mistake.  - She reports no recent episodes of chest pain, difficulty breathing, fevers, or chills.    Supplemental information: She has undergone tubal ligation. She has an annual exam scheduled in September. She is not interested in the COVID-19 vaccine. She has signed a contract for controlled substances. She has recently started taking Zepbound.    Health Maintenance: Completed    Objective:     Exam:  Pulse 76   Temp 36.7 °C (98.1 °F) (Temporal)   Ht 1.626 m (5' 4\")   Wt 80.9 kg (178 lb 6.4 oz)   SpO2 96%   BMI 30.62 kg/m²  Body mass index is 30.62 kg/m².    Physical Exam  Constitutional:       Appearance: Normal appearance.   Cardiovascular:      Rate and Rhythm: Normal rate and regular rhythm.      Heart sounds: Normal heart sounds.   Pulmonary:      Effort: Pulmonary effort is normal.      Breath sounds: Normal breath sounds.   Musculoskeletal:      Cervical back: Normal range " of motion and neck supple.   Lymphadenopathy:      Cervical: No cervical adenopathy.   Neurological:      Mental Status: She is alert.             Results      Assessment & Plan:     1. Attention deficit hyperactivity disorder (ADHD), unspecified ADHD type  Controlled Substance Treatment Agreement    Pain Management Screen    amphetamine-dextroamphetamine (ADDERALL XR) 20 MG per XR capsule    amphetamine-dextroamphetamine (ADDERALL) 10 MG Tab    amphetamine-dextroamphetamine (ADDERALL XR) 20 MG per XR capsule    amphetamine-dextroamphetamine (ADDERALL XR) 20 MG per XR capsule    amphetamine-dextroamphetamine (ADDERALL) 10 MG Tab    amphetamine-dextroamphetamine (ADDERALL) 10 MG Tab      2. Stimulant dependence (HCC)  Controlled Substance Treatment Agreement    Pain Management Screen    amphetamine-dextroamphetamine (ADDERALL XR) 20 MG per XR capsule    amphetamine-dextroamphetamine (ADDERALL) 10 MG Tab    amphetamine-dextroamphetamine (ADDERALL XR) 20 MG per XR capsule    amphetamine-dextroamphetamine (ADDERALL XR) 20 MG per XR capsule    amphetamine-dextroamphetamine (ADDERALL) 10 MG Tab    amphetamine-dextroamphetamine (ADDERALL) 10 MG Tab          Assessment & Plan  1. ADHD with stimulant dependence: Chronic, controlled. She reports Adderall XR 20 mg qAM + Adderall 10 mg at noon as needed continues to work well to manage symptoms without side effects. This regimen has been stable for several months and I will take over prescribing from her psychiatrist.  We will continue the current regimen.  Informed consent and controlled substance treatment agreement obtained today.  Urine drug screen obtained today.  Risks versus benefits were reviewed. Obtained and reviewed patient utilization report from [facility] database on 6/6/2025 9:52 AM  prior to writing prescription for controlled substance II, III or IV per [place] bill . Based on assessment of the report, the prescription is medically necessary.        Referral for genetic research was offered. Patient is a participant.    Return in about 3 months (around 9/6/2025) for Controlled substance - 9/4/2025.    Please note that this dictation was created using voice recognition software. I have made every reasonable attempt to correct obvious errors, but I expect that there are errors of grammar and possibly content that I did not discover before finalizing the note.

## 2025-06-11 ENCOUNTER — RESULTS FOLLOW-UP (OUTPATIENT)
Dept: MEDICAL GROUP | Facility: PHYSICIAN GROUP | Age: 35
End: 2025-06-11
Payer: COMMERCIAL

## 2025-06-11 LAB
1OH-MIDAZOLAM UR QL SCN: NOT DETECTED
6MAM UR QL: NOT DETECTED
7AMINOCLONAZEPAM UR QL: NOT DETECTED
A-OH ALPRAZ UR QL: NOT DETECTED
ALPRAZ UR QL: NOT DETECTED
AMPHET UR QL SCN: PRESENT
ANNOTATION COMMENT IMP: NORMAL
BARBITURATES UR QL: NEGATIVE
BUPRENORPHINE UR QL: NOT DETECTED
BZE UR QL: NEGATIVE
CARBOXYTHC UR QL: NEGATIVE
CARISOPRODOL UR QL: NEGATIVE
CLONAZEPAM UR QL: NOT DETECTED
CODEINE UR QL: NOT DETECTED
CREAT UR-MCNC: 165 MG/DL (ref 20–400)
DIAZEPAM UR QL: NOT DETECTED
ETHYL GLUCURONIDE UR QL: NEGATIVE
FENTANYL UR QL: NOT DETECTED
GABAPENTIN UR QL CFM: NOT DETECTED
HYDROCODONE UR QL: NOT DETECTED
HYDROMORPHONE UR QL: NOT DETECTED
LORAZEPAM UR QL: NOT DETECTED
MDA UR QL: NOT DETECTED
MDEA UR QL: NOT DETECTED
MDMA UR QL: NOT DETECTED
ME-PHENIDATE UR QL: NOT DETECTED
METHADONE UR QL: NEGATIVE
METHAMPHET UR QL: NOT DETECTED
MIDAZOLAM UR QL SCN: NOT DETECTED
MORPHINE UR QL: NOT DETECTED
NALOXONE UR QL CFM: NOT DETECTED
NORBUPRENORPHINE UR QL CFM: NOT DETECTED
NORDIAZEPAM UR QL: NOT DETECTED
NORFENTANYL UR QL: NOT DETECTED
NORHYDROCODONE UR QL CFM: NOT DETECTED
NORMEPERIDINE UR QL CFM: NOT DETECTED
NOROXYCODONE UR QL CFM: NOT DETECTED
NOROXYMORPHONE UR QL SCN: NOT DETECTED
OXAZEPAM UR QL: NOT DETECTED
OXYCODONE UR QL: NOT DETECTED
OXYMORPHONE UR QL: NOT DETECTED
PATHOLOGY STUDY: NORMAL
PCP UR QL: NEGATIVE
PHENTERMINE UR QL: NOT DETECTED
PREGABALIN UR QL CFM: NOT DETECTED
SERVICE CMNT-IMP: NORMAL
TAPENTADOL UR QL SCN: NOT DETECTED
TAPENTADOL UR QL SCN: NOT DETECTED
TEMAZEPAM UR QL: NOT DETECTED
TRAMADOL UR QL: NEGATIVE
ZOLPIDEM PHENYL-4-CARB UR QL SCN: NOT DETECTED
ZOLPIDEM UR QL: NOT DETECTED

## 2025-06-16 ENCOUNTER — APPOINTMENT (OUTPATIENT)
Dept: MEDICAL GROUP | Facility: PHYSICIAN GROUP | Age: 35
End: 2025-06-16
Payer: COMMERCIAL

## 2025-07-16 ENCOUNTER — APPOINTMENT (OUTPATIENT)
Dept: MEDICAL GROUP | Facility: PHYSICIAN GROUP | Age: 35
End: 2025-07-16
Payer: COMMERCIAL

## 2025-07-17 NOTE — PROGRESS NOTES
Urogynecology & Reconstructive Pelvic Surgery    Brandi Weston MRN:[id number] :1990    Referred by: Car Munguia    Reason for Visit:   Chief Complaint   Patient presents with    New Patient     Consult          Subjective     History of Presenting Illness:    Natasha Weston is a 35 y.o. P2 who presents for the evaluation and management of stress incontinence, cystocele, rectocele.     Her most bothersome symptom is urinary leaks, this happens on a daily basis with most activities, including cough, sneeze, exercise, squatting.  This started with her most recent childbirth and is slowly worsening over time    She also has subtle vaginal bulge and protrusion symptoms, worse with periods.  However she is bothered by subjective difficulty emptying her rectum, and has to splint approximately weekly in order to have a bowel movement.    She has regular periods, but bleeding has increased, is heavier and more painful.  She has no intermenstrual bleeding or significant pain between her periods with the exception of possible ovulation pain.  She is interested in hysterectomy for definitive treatment    Is up-to-date with her Paps    Prior Pelvic surgery:   : Bilateral salpingectomy     Prior treatment:   None      Pelvic floor symptom review:     Bladder:   Voids per day: 4-10 Voids per night: 2      Urinary incontinence episodes per day: 1-2    Urge leakage:  None   Stress leakage: With Cough, With Laugh, With Exercise, and With Bending/Squatting   Continuous / insensible urine loss: No    Nocturnal enuresis: No    Leakage volume: Drops to moderate   Number of pads/day: 1-2    Bladder emptying: Complete   Voiding symptoms: None   UTI in last 12 months: No   Other urologic history: No      Prolapse:     Prolapse symptoms: Exteriorized Tissue   Degree of prolapse: To Introitus      Bowel:    Constipation: sometimes   Splinting to evacuate: Yes   Painful evacuation: No    Difficulty  "emptying rectum: Yes   Incontinence to stool: no   Blood in stool: No        Sexual function:    Sexually active: Yes       Past medical and surgical history      Past medical history:  Past Medical History:   Diagnosis Date    Psychiatric problem 07/29/2022    Anxiety. \"Anxiety attacks\" - no recent problems, no medication.    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  Negative home COVID test 7/28/22.  7/29/22 - states as of today has a dry cough will notify Surgeon if symptoms haven't resolved in the next few days.    Anesthesia     \"Doesn't take a lot to knock me out\"    Bronchitis     \"Flares up every now and then\"    Seasonal allergies      Past surgical history:  Past Surgical History:   Procedure Laterality Date    MAMMOPLASTY REDUCTION Bilateral 04/25/2024    SEPTOPLASTY  11/21/2023    NH LAP,DIAGNOSTIC ABDOMEN N/A 08/12/2022    Procedure: LAPAROSCOPY;  Surgeon: Car Munguia M.D.;  Location: SURGERY SAME DAY [facility];  Service: Gynecology    SALPINGECTOMY Bilateral 08/12/2022    Procedure: SALPINGECTOMY;  Surgeon: Car Munguia M.D.;  Location: SURGERY SAME DAY [facility];  Service: Gynecology    APPENDECTOMY  2016    LEE  01/01/2011     Medications:has a current medication list which includes the following prescription(s): [START ON 8/5/2025] amphetamine-dextroamphetamine, [START ON 8/5/2025] amphetamine-dextroamphetamine, tirzepatide, amphetamine-dextroamphetamine, and amphetamine-dextroamphetamine.  Allergies:Keppra [levetiracetam] and Mushroom extract complex  Family history:  Family History   Problem Relation Age of Onset    Cancer Mother         cervical, mid-30s    Ovarian Cancer Mother         " "mid-30s    Cancer Father         bone cancer    Hyperlipidemia Father     Heart Disease Father         MI x2    Seizures Sister     GI Disease Sister         \"intestinal problem\"    Breast Cancer Maternal Grandmother     Diabetes Maternal Grandmother     Colorectal Cancer Neg Hx     Peritoneal Cancer Neg Hx     Tubal Cancer Neg Hx      Social history: reports that she has quit smoking. Her smoking use included cigarettes. She has a 0.3 pack-year smoking history. She has never used smokeless tobacco. She reports current alcohol use of about 0.6 oz of alcohol per week. She reports that she does not currently use drugs after having used the following drugs: Marijuana.    Review of systems: A full review of systems was performed, and negative with the exception of want is noted above in the HPI.        Objective        /80 (BP Location: Right arm, Patient Position: Sitting, BP Cuff Size: Adult)   Pulse 71   Ht 5' 4\"   Wt 162 lb   LMP 07/06/2025 (Exact Date)   BMI 27.81 kg/m²     Physical Exam  Vitals reviewed. Exam conducted with a chaperone present (MA - see notes.).   Constitutional:       Appearance: Normal appearance.   HENT:      Head: Normocephalic.      Mouth/Throat:      Mouth: Mucous membranes are moist.   Cardiovascular:      Rate and Rhythm: Normal rate.   Pulmonary:      Effort: Pulmonary effort is normal.   Abdominal:      Palpations: Abdomen is soft. There is no mass.      Tenderness: There is no abdominal tenderness.   Skin:     General: Skin is warm and dry.   Neurological:      Mental Status: She is alert.   Psychiatric:         Mood and Affect: Mood normal.         Genitourinary:    Vulva: WNL   Bulbocavernosus reflex: Intact   Anal wink reflex: Intact   Perineal sensation: WNL   Urethra: Hypermobile   Vagina: WNL   Atrophy: None   Cough stress test: Positive    Pelvic floor:    POP-Q: Aa -1 / Ba -1 / TVL 11 / C -4 / D -8 / Ap 0 / Bp 0 / GH 4 / PB 2   Prolapse stage: 2   Paravaginal defect: " mild   Cervical elongation: No    Urethral tenderness: No    Bladder/ suprapubic tenderness: No    Levator tenderness: None   Levator muscle tone: WNL   Pelvic floor contraction strength (modified Oxford scale): 3=Moderate   Pelvic floor contraction duration: Brief    Bimanual exam: Small, Mobile Uterus    Procedure Performed: No    Diagnostic test and records review:      Post-void residual: 83 mL, performed by Bladder Scanner    Labs: n/a    Radiology: n/a    Procedural: n/a    Documentation reviewed: Prior EMR Records           Assessment & Plan     Natasha Weston is a 35 y.o. P2 with stress incontinence, stage II prolapse, dysmenorrhea/menorrhagia. We discussed my recommendations for further diagnosis and treatment at length today.     1. Stress incontinence  2. Urethral hypermobility  She reports stress urinary incontinence (VAZQUEZ) symptoms. The pathogenesis of VAZQUEZ includes genetic tendency,  aging, menopause and childbirth injuries, and is overall caused by a weakness of the pelvic support of the urethra, and thus interventions to fix this enhance the structure either via behavioral/physical therapy or through surgical intervention.  I discussed options for management which include both nonsurgical and surgical options.   -We discussed non-surgical options including pelvic floor physical therapy and pessary use.  I discussed different types of pessary that may be used for stress urinary incontinence as well as pessary care.    -Procedural interventions aimed to strengthen the urethral support or the urethral opening, and there are various techniques that are tailored to each patient's symptoms and bladder function  A mid urethral sling is the gold standard treatment for most stress urinary incontinence, especially when the urethra is hypermobile/unsupported.  This involves the placement of a safe, light weight piece of mesh under the urethra to help support it back into the normal anatomic  location.  This is an outpatient vaginal surgery with same-day discharge and no need for narcotic pain medications.  While not the most painful procedure, there are restrictions on activity for 6 weeks afterwards including vigorous exercise, heavy lifting, straining, intercourse, sitting in water/swimming.  Approximately 90% of patients experience of significant improvement in their leakage symptoms after sling procedure, and 60 to 70% report a complete resolution of her urinary leakage.  Patients with urgency may also see improvement, but this may also persist or worsen due to different underlying causes of urinary urgency.  Approximately 30% of patients may require a temporary Clay catheter after this procedure, and 1/50 patients may need an adjustment to loosen the sling in the immediate postoperative period due to overtightening.  Mesh is considered overwhelmingly safe and has been extensively studied, but there is an approximately 1 to 3% chance long-term of a mesh complication, the majority of which are small mesh exposures which can be managed either through vaginal estrogen or excision of mesh.  Major complications are rare.   A urethral bulking procedure using Bulkamid is an alternative therapy for stress urinary incontinence.  This does not use any permanent implanted materials, but employed as a safe hydrogel which allows ingrowth of the patient's own tissue to strengthen the opening of the bladder into the urethra.  This is also a same-day procedure without any postoperative restrictions.  While less invasive, success rates are lower when compared to the sling, with approximately 60% of patients seeing a dramatic improvement in their symptoms, 90% of patients seeing some improvement.  1/4 patients require 2 treatments in order to get optimal therapeutic results.  There is a 10 to 20% chance of needing a temporary Clay catheter for 1-2 days after the procedure.  We also reviewed fascial/nonmesh sling  procedure and [place] urethropexy, but I do not think these are optimal approaches for her  She would like to move forward with a retropubic mid urethral sling at time of prolapse of her hysterectomy, see below        3. Incomplete uterovaginal prolapse  4. Cystocele, midline  5. Rectocele  She has symptomatic pelvic organ prolapse,  predominant.  In most cases, this is not a dangerous condition that necessitates treatment in all patients, but treatment is offered when it impacts quality of life, bladder function, or bowel function.  I reviewed the clinical findings and discussed the pathogenesis extensively, including genetic tendency, aging, menopause and childbirth injuries. Also discussed options for management, including both nonsurgical and surgical options.   Nonsurgical options include both expectant management, pelvic floor physical therapy to prevent progression, and pessary use.   We reviewed all surgical options including both vaginal and laparoscopic reconstructive approaches, and those using native tissue (nonmesh) and mesh augmented approaches.  We also discussed uterine-sparing approaches and those which involve hysterectomy. We discussed recurrent/retreatment risk for all surgical approaches.  Native tissue (nonmesh) approaches have a retreatment rate in excess of 20% long-term, and this is reduced with mesh augmentation (approximately 5-8%).  Recurrent forms of surgical mesh have been extensively evaluated for their safety, but there is a 1 to 5% risk long-term of mesh complications, the majority of which include mesh exposure, which was discussed with her.  Given our discussion, her goals, exam findings, and past medical history, I think the best surgical options for her would be: Laparoscopic approach to native tissue repair, either with or without hysterectomy, but given her bothersome menstrual symptoms hysterectomy would likely be favored  She opts for scheduling surgical correction of all  relevant symptoms via: Robotic assisted total laparoscopic hysterectomy, vaginal vault suspension, repair/perineorrhaphy, retropubic urethral sling, and all indicated procedures.  In addition to my verbal counseling today, detailed written counseling was provided that detailed the surgical procedures, preoperative information, risks of surgery, postoperative recovery arc.  She was instructed to review these in detail prior to her preoperative visit and to bring questions.      6. Dysmenorrhea  7. Menorrhagia with regular cycle  Her periods have become heavier and more painful.  Counseled on nonsurgical options for the symptoms.  These are still regular on a monthly basis.  When given options for prolapse repair with or without hysterectomy she opts for hysterectomy in order to concomitantly treat these symptoms.  She has completed childbearing and is already s/p salpingectomy               Virgil Stewart MD, FACOG  Urogynecology and Reconstructive Pelvic Surgery  Department of Obstetrics and Gynecology  [facility]        This medical record contains text that has been entered with the assistance of computer voice recognition and dictation software.  Therefore, it may contain unintended errors in text, spelling, punctuation, or grammar

## 2025-07-18 ENCOUNTER — GYNECOLOGY VISIT (OUTPATIENT)
Dept: GYNECOLOGY | Facility: CLINIC | Age: 35
End: 2025-07-18
Payer: COMMERCIAL

## 2025-07-18 VITALS
BODY MASS INDEX: 27.66 KG/M2 | DIASTOLIC BLOOD PRESSURE: 80 MMHG | SYSTOLIC BLOOD PRESSURE: 123 MMHG | HEIGHT: 64 IN | WEIGHT: 162 LBS | HEART RATE: 71 BPM

## 2025-07-18 DIAGNOSIS — N81.2 INCOMPLETE UTEROVAGINAL PROLAPSE: ICD-10-CM

## 2025-07-18 DIAGNOSIS — N92.0 MENORRHAGIA WITH REGULAR CYCLE: ICD-10-CM

## 2025-07-18 DIAGNOSIS — N81.6 RECTOCELE: ICD-10-CM

## 2025-07-18 DIAGNOSIS — N94.6 DYSMENORRHEA: ICD-10-CM

## 2025-07-18 DIAGNOSIS — N39.3 STRESS INCONTINENCE: Primary | ICD-10-CM

## 2025-07-18 DIAGNOSIS — N81.11 CYSTOCELE, MIDLINE: ICD-10-CM

## 2025-07-18 DIAGNOSIS — N36.41 URETHRAL HYPERMOBILITY: ICD-10-CM

## 2025-07-18 LAB
APPEARANCE UR: CLEAR
BILIRUB UR STRIP-MCNC: NEGATIVE MG/DL
COLOR UR AUTO: NORMAL
GLUCOSE UR STRIP.AUTO-MCNC: NEGATIVE MG/DL
KETONES UR STRIP.AUTO-MCNC: 15 MG/DL
LEUKOCYTE ESTERASE UR QL STRIP.AUTO: NEGATIVE
NITRITE UR QL STRIP.AUTO: NEGATIVE
PH UR STRIP.AUTO: 7 [PH] (ref 5–8)
PROT UR QL STRIP: NEGATIVE MG/DL
RBC UR QL AUTO: NEGATIVE
SP GR UR STRIP.AUTO: 1.02
UROBILINOGEN UR STRIP-MCNC: 0.2 MG/DL

## 2025-07-18 PROCEDURE — 81002 URINALYSIS NONAUTO W/O SCOPE: CPT | Performed by: STUDENT IN AN ORGANIZED HEALTH CARE EDUCATION/TRAINING PROGRAM

## 2025-07-18 PROCEDURE — 3074F SYST BP LT 130 MM HG: CPT | Performed by: STUDENT IN AN ORGANIZED HEALTH CARE EDUCATION/TRAINING PROGRAM

## 2025-07-18 PROCEDURE — 3079F DIAST BP 80-89 MM HG: CPT | Performed by: STUDENT IN AN ORGANIZED HEALTH CARE EDUCATION/TRAINING PROGRAM

## 2025-07-18 PROCEDURE — 99459 PELVIC EXAMINATION: CPT | Performed by: STUDENT IN AN ORGANIZED HEALTH CARE EDUCATION/TRAINING PROGRAM

## 2025-07-18 PROCEDURE — 99204 OFFICE O/P NEW MOD 45 MIN: CPT | Performed by: STUDENT IN AN ORGANIZED HEALTH CARE EDUCATION/TRAINING PROGRAM

## 2025-07-18 ASSESSMENT — FIBROSIS 4 INDEX: FIB4 SCORE: 0.48

## 2025-07-18 NOTE — PROGRESS NOTES
PT here today for consult   Ref for prolapse; VAZQUEZ   Hysterectomy?  X   UTI Symtoms? X  Good #: [phone]   PVR : 83  mL   Pharmacy Verified

## 2025-07-18 NOTE — PATIENT INSTRUCTIONS
Pre-op: What you should know before your surgery  Laparoscopic/robotic reconstructive surgery for prolapse   (native tissue)        What you should know before your surgery     You are scheduled for surgery to fix your prolapse (vaginal bulge) using sutures to suspend your own tissues back into a supported position. These surgeries are minimally-invasive, using only small “keyhole” cuts on the abdomen and in the vagina. The documents in this packet will outline each part of the surgery. There may be a few “possible” surgeries listed. We use the word “possible” because we tailor the surgery based on your needs. This means we won't know how much surgery you'll need until after you receive anesthesia and fall asleep.     When you fall asleep, the pelvic muscles will relax, allowing us to determine how much surgery you need. In general, we will do as few procedures as possible to fix your prolapse but address each area as needed.    Goals of prolapse surgery:   The primary goal of surgery is to repair the prolapse and eliminate the symptoms of a vaginal bulge and pressure. Depending on your symptoms, the surgery may possibly improve bladder and/or rectal emptying, as well as urinary incontinence.      Prolapse recurrence:  No permanent mesh material will be used to fix prolapse in this procedure. However, since we are relying on your own tissues to heal into place and correct the prolapse, there is a risk that your symptoms may return over time. The majority of patients are satisfied with their repair long-term and do not require any further surgery. However, after non-mesh vaginal surgery, prolapse can eventually return in up to half of women.  About 10% of women require another treatment.  Your personal risk of recurrence/retreatment is based on multiple factors including genetics, your body weight, smoking, frequent heavy lifting, and future vaginal deliveries.     Sexual function:  Following surgical repair, most  patients experience improvements in their sexual function. Surgery tends to improve the general discomfort of sex associated with prolapse. However, if you have a long history of pain with sex (either externally or internally), this is unlikely to be due to prolapse. Therefore, may not improve these symptoms.     Surgery involves the cutting and re-sewing of the vaginal tissues. Scar tissue may form after surgery, which can lead to painful sex for some patients. In many patients, this new pain goes away over time or can be improved with pelvic physical therapy, lubrication, dilators, or vaginal estrogen.       Bladder urgency and incontinence after surgery:  Bladder tests may be performed before your surgery to see whether you are at risk for new or worsening urinary leakage after prolapse repair. Our bladder testing is a great tool to find out who is at risk for urinary leakage, but it is not perfect. There is a chance you may have new or increased leakage with coughing, sneezing, or laughing after surgery. Problems with leakage can be addressed in a number of ways. Bladder urgency and/or frequency often improves after surgery, but it may worsen in some patients. We have various medications and therapies that can help with this urgency after surgery, if necessary.     Risk of postoperative pain:   Pain after prolapse surgery is a normal part of the healing process, but usually well-tolerated. Common areas of pain include the pelvis, buttocks, hips and back, often related to positioning. Try changing your position when sitting or resting in bed to relieve the pain.     Expect to have some pain when you are discharged home. This should improve with time and with use of medications. See “after surgery” instructions for more details on pain control.     General risks of pelvic reconstructive surgery: Specific risks for your procedure are discussed separately  Anesthesia: With modern anesthetics and monitoring  equipment, complications due to anesthesia are very rare. Your anesthesiologist will discuss what will be most suitable for you on the day of surgery  Bleeding: All surgery results in bleeding, however this surgery usually amounts to blood loss between a tablespoon and a teacup. Large amounts of blood loss that requires a blood transfusion are not common (only 1-2% of women) in prolapse surgery.  Blood transfusion, if needed, is safe. Minor reactions like fever or allergy occur in less than 1% of transfusions. Riskan infection or mismatched blood is very rare (less than 1 out of every 100,000 transfusions).   Infection: The most common infection with prolapse surgery is a urinary tract infection (UTI). This is easily treated. Wound infections occur in 2-3% of patients. Rarely, infection of the abdominal/vaginal wounds may occur, or abscesses in the pelvis may develop. These infections may need to be treated with antibiotics or another procedure to fix them. Risk factors for infections and wound complications include diabetes, smoking, obesity, and other chronic illnesses.  Blood Clots: Clots in the blood vessels of the legs and lungs are potentially dangerous and can occur in patients undergoing prolapse surgery. This is prevented with leg compression during surgery, and sometimes, an injected blood thinner. We strongly encourage you to stay mobile because this is an important way to prevent clots.  Damage to surrounding organs. The pelvic organs are all very close together. The risk of damage to other organs increases if you have had prior pelvic surgeries, as well as a history of endometriosis or pelvic infection. These conditions can cause scar tissue to develop in the pelvis. Scar tissue can cause organs to stick together, making the surgery more difficult.    Ureter and bladder damage: The ureters are tubes that carry urine from the kidneys to the bladder. They travel very close to the uterus and vagina. The  bladder is attached to both your uterus and the front of the vagina. Damage/injury can happen during prolapse-repair procedures. A cystoscopy (camera in the bladder) will be done during your surgery to ensure there is no bladder or ureter damage/injury. If injury occurs, it may require temporary placement of a stent (drainage tube). In rare cases, a larger abdominal incision may be needed to repair the injury. A bladder catheter may need to stay in place temporarily after surgery.  Bowel damage: Bowel damage/injury is uncommon during your surgery. Any damage that is seen in surgery will be fixed. Very rarely, a temporary ostomy (connection of bowel to the front of the abdomen and collection of stool with a bag) is required for a higher-risk bowel injury.  Vascular damage: Major damage to blood vessels is uncommon. If this occurs, it may require a larger surgery and/or blood transfusion.  Fistula: A fistula is an abnormal connection between the vagina and either the bladder or rectum. A fistula leads to leakage of urine or stool. These are rare complications that arise during healing from pelvic surgery that sometimes require additional surgeries to correct. We take great care is during your surgery to prevent these fistulas. If they do occur, your Urogynecologist is the leading expert in fixing them.     Main Surgical Procedure:    Vaginal vault suspension - sacrospinous, iliococcygeus, uterosacral  (support top of the vagina by attaching it to ligament/muscle)       The entire procedure will be completed in a minimally invasive manner, with all incisions inside the vagina. Once you are asleep with anesthesia, a thorough exam will be performed to confirm the areas needing repair. Your surgeon will carefully dissect into the space between your vagina and other pelvic organs, and place sutures through a ligament (sacrospinous, uterosacral) or a muscle (iliococcygeus) in your buttock. These sutures will be connected to  "the apex (top) of your vagina, lifting it back up to a more normal position. This may be done with a small permanent suture, or sometimes, an absorbable suture.    In addition to the general surgical risks listed in the pre-operative counseling pamphlet, this procedure carries the risk of:  Buttock pain: The suture in the ligament sometimes causes an aching buttock discomfort as it heals. This is temporary, meaning it should resolve over a few weeks. Rarely, this leads to severe pain from nerve entrapment, which is repaired on the same day of surgery by removing the suture.    Hematoma (pooling of blood) in the areas behind the vagina: This due to the many small blood vessels in this area. This may require a “packing” (long piece of gauze) to be placed in the vagina to put more pressure on the bleeding areas. This will be removed a couple hours after the surgery or the next day. More rarely, a large blood clot may require another procedure to drain and repair the area.    After this procedure is complete, you will be re-examined and then proceed with the following possible procedures:    Main surgical procedure:  Posterior repair, perineorrhaphy  (fix prolapse of the back of the vagina/rectum and pelvic muscles)        The entire procedure will be completed in a minimally invasive manner, with all incisions inside of the vagina. Once you are asleep, a thorough exam will be performed to confirm the areas needing repair. A cut is made along the back wall of the vagina where the rectum is pushing down, and the skin above the rectum is  from the underlying supportive tissue. This stronger tissue underneath is then repaired using sutures that will dissolve over time. Sometimes excess skin is removed. The skin is then closed.     If the area between the vagina and the anus (called the perineum) is weak, then sutures will be placed to make that area stronger. This is called a \"perineorrhaphy.\" This will be just " like a repair of an episiotomy or a vaginal tear after having a baby.     The risk of these procedures is similar to those mentioned in the “pre-op” leaflet. However, any surgery to the back wall of the vagina carries a higher risk of pain with sexual intercourse after surgery (up to 10%) due to scar formation. Great care is taken not to narrow the vagina more than necessary. The perineorrhaphy (or episiotomy type of repair) can be uncomfortable and may be difficult to sit normally for up to 6 weeks after surgery. You may use a doughnut cushion to sit on if needed.          Main Surgical Procedure:    Midurethral sling    (mesh sling under urethra to stop leakage)    The mid-urethral sling procedure will treat the problem of leakage of urine when you experience an increase in abdominal pressure, such as with coughing, sneezing, exercising, or laughing (stress incontinence). This procedure may also be performed if there is a high likelihood that you will develop new stress incontinence after a prolapse repair.    After you go to sleep, the lightweight mesh sling is placed through a small cut made in the vagina over the mid-point of the urethra. Through this cut, the two ends of the sling are passed with a special needle from the vagina, along either side of the urethra, exiting through two very small cuts made just above the pubic bone in the hairline (retropubic sling). Sometimes, the sling is passed through the groin area (trans-obturator sling). The surgeon will then use a camera (cystoscope) to check that the sling is correctly positioned and not sitting within the bladder. The sling is then adjusted so that it sits loosely underneath the urethra. The vaginal skin is then stitched to cover the sling. The ends of the sling are cut off below the skin and the incision closed with skin glue or bandages.    This urinary incontinence mesh is not the same kind of vaginal mesh you may have heard about on television,  which was removed from the market.  Your surgeon will speak to you in detail about this safer sling mesh at your visit.      Risks of this particular procedure include:  Difficulty emptying your bladder requiring a catheter is common immediately after surgery (20-50%). This usually resolves within 1 week. Most patients who can't urinate immediately after surgery will return to the office in 3-5 days to test the bladder again and remove the catheter. Longer-term difficulty passing urine may occur in 1-5% of patients. During this time your doctor may recommend a fine tube or catheter be used to drain the bladder. If your urine stream remains very slow or you cannot empty the bladder well even after the swelling has settled, your provider will discuss other possibilities, such as cutting or loosening the sling.  Sling exposure. Occasionally the sling can appear in the wall of the vagina a few weeks, months, or years after an operation. Symptoms may include vaginal bleeding, vaginal discharge, or pain with intercourse for the patient or her partner. In such cases, you should consult your surgeon for further advice. Management would involve either vaginal estrogen cream to help the skin grow over the sling, or cutting out the small part of the mesh coming through.  Rarely does the entire vaginal portion of the mesh need to be removed.  The arms of the mesh in the muscles cannot always be removed.  The risk of the mesh coming out into the vagina is 2-4%.  Bladder or urethral perforation. Bladder perforation occurs in 1-5% of procedures. Your surgeon will check for damage during the operation by looking inside the bladder and urethra using a cystoscope, a special camera placed into the bladder. Removing and correctly locating the needle to which the sling is attached should resolve the situation. Bladder perforation, as long as it is recognized, does not affect the success of the operation. Damage to the urethra is more  difficult to deal with and should be discussed with your surgeon. This is uncommon, and the sling may not be placed if this occurs.  Pain. Long-term pain following sling surgery is unusual. Studies suggest that after the operation about 1% will develop vaginal or groin pain. In most cases pain is short lived and does not occur for more than 1 to 2 weeks. In the rare instance that the pain does not go away with physical therapy or vaginal estrogen,  the sling may need to be removed.      Post-op: What to expect after your surgery    Recovery room  You can expect to stay in the recovery room for a few hours until you are alert. There may be a gauze packing in your vagina, which will be removed before you go home. Pain is normal after surgery but should be tolerable. Your pain will become less over the first 2 weeks. If your pain is difficult to control or you need more nursing care, you will stay in the hospital overnight. Most patients do very well going home on the day of surgery.     Bladder function  You will wake up with a small tube (catheter) in your bladder. A bladder test, called a “void trial”, will be performed by the nurse before you go home. The test is done to make sure you can empty your bladder normally. To do the bladder test, the nurse will fill your bladder with sterile water, remove the catheter, and give you 30 minutes to try and urinate (pee) on your own. If you cannot urinate, or if you only urinate a small amount, you will need to:   Go home with a catheter that stays in your bladder OR  Learn to put a catheter into your bladder a few times a day to empty it    Use of a catheter is temporary and usually needed for 2-4 days. It is very common for the bladder to work slowly, or sluggishly, after this type of surgery. One in every 3-4 patients will require some type of catheterization. An antibiotic may be prescribed while the catheter is in place to decrease the risk of infection.    Call the  surgeon for treatment, if you have signs and symptoms of a urinary tract infection, including:  Burning with urination  Bladder pain  Worsening need to urinate right away,  Urine with a bad smell    Vaginal care  Do not go swimming, take sitting baths, and have sexual intercourse for 6 weeks after surgery Do not place anything in your vagina except vaginal estrogen cream, if instructed to do so by your surgeon.   Vaginal discharge and bleeding/spotting is also normal through the entire 6-week recovery. Sometimes small sutures will fall out of the vagina as they dissolve. This is normal. Contact the office with any heavy bleeding, foul discharge or worsening pain.. Contact us with any heavy bleeding, foul discharge or worsening pain.     Pain management  Surgical pain is controlled in most patients with only acetaminophen (Tylenol) and non-steroidal anti-inflammatory drugs (NSAIDs), such as ibuprofen (Advil). These drugs can be taken together because they do not interact with each other. Check your prescription for specific dosing instructions. A cold compress can help with pain in the vaginal area.  Sometimes, a short course of narcotics, such as oxycodone or hydromorphone is required. We do not recommend using narcotics regularly as it can lead to constipation or dizziness and falls. Do not drive or operate heavy machinery while using narcotics. You are unlikely to become addicted if you need to take a narcotic medication a few times within the first week of your surgery.  After the first few days to one week, your pain should decrease and you should not have pain severe enough to need narcotics. If you continue having severe pain, contact your surgeon for re-evaluation.     Abdominal wound care  The incisions on your abdomen will be closed with either small bandages or a surgical glue. There are also tiny dissolving sutures beneath the skin. You can shower with these in place. In shower, let the soapy warm water  run over you incisions. Do not scrub or wipe you incisions. The bandages will fall off on their own, or you can remove them after at least 3 days if they become discolored or dirty. The glue will also fall off on its own after a few weeks. Small sutures that pop out through the skin are normal and will dissolve over time. Contact us if you feel increasing pain or warmth at the incision. Also, call if you see increased redness or discharge (pus) at the incision.      Bowel function  Constipation is common after surgery, and it sometimes take several days before having a normal bowel movement. It is important to use a bowel regimen to keep your stools soft and avoid straining with bowel movements, which may damage the prolapse repair before it has healed. Most patients will be given a prescription for a stool softener (docusate) as well as a gentle laxative (Miralax or lactulose), which adds water to the stool to make it easier to pass. Take these daily throughout your recovery, and hold for a day if you develop diarrhea. Please call us if you experience any repeated episodes of vomiting, worsening abdominal pain/bloating, or are unable to have a bowel movement for more than 3 days.     Activity restrictions  During the first 6 weeks you should avoid any type of heavy lifting.  Gentle walking is good exercise. Start with about 10 minutes a day when you feel ready and build up gradually. Avoid repetitive squatting or bending at the waist. Avoid any fitness-type training, aerobics, etc. for at least 6 weeks after surgery. Listen to your body during the recovery period, and increase activity when you feel comfortable. Generally, you will need 4-6 weeks off work. This period may be longer if you have a very physical job.    Return to sex  When your surgeon clears you to resume sex (if desired), you should start out slowly and to use adequate lubrication. Your vagina and pelvis have been re-structured, and it can take time  to get used to sex after surgery. Most scar tissue softens over time and discomfort improves. If discomfort does not go away, contact us to discuss to schedule an exam and to discuss further options. In some cases, your surgeon may prescribe a low dose of vaginal estrogen to help with vaginal dryness and pain that may happen with sex.

## 2025-07-21 ENCOUNTER — APPOINTMENT (OUTPATIENT)
Dept: MEDICAL GROUP | Facility: PHYSICIAN GROUP | Age: 35
End: 2025-07-21
Payer: COMMERCIAL

## 2025-11-10 ENCOUNTER — APPOINTMENT (OUTPATIENT)
Dept: MEDICAL GROUP | Facility: PHYSICIAN GROUP | Age: 35
End: 2025-11-10
Payer: COMMERCIAL

## (undated) DEVICE — SUTURE 0 VICRYL PLUS CT-2 - 27 INCH (36/BX)

## (undated) DEVICE — SUTURE 4-0 VICRYL PLUS FS-2 - 27 INCH (36/BX)

## (undated) DEVICE — SODIUM CHL IRRIGATION 0.9% 1000ML (12EA/CA)

## (undated) DEVICE — TRAY SRGPRP PVP IOD WT PRP - (20/CA)

## (undated) DEVICE — TUBING CLEARLINK DUO-VENT - C-FLO (48EA/CA)

## (undated) DEVICE — GLOVE BIOGEL SZ 9 SURGICAL PF LTX - (50/BX 4BX/CA)

## (undated) DEVICE — CATHETER IV 20 GA X 1-1/4 ---SURG.& SDS ONLY--- (50EA/BX)

## (undated) DEVICE — GOWN WARMING STANDARD FLEX - (30/CA)

## (undated) DEVICE — LACTATED RINGERS INJ 1000 ML - (14EA/CA 60CA/PF)

## (undated) DEVICE — DERMABOND ADVANCED - (12EA/BX)

## (undated) DEVICE — CANISTER SUCTION RIGID RED 1500CC (40EA/CA)

## (undated) DEVICE — SLEEVE VASO CALF MED - (10PR/CA)

## (undated) DEVICE — NEEDLE INSFL 120MM 14GA VRRS - (20/BX)

## (undated) DEVICE — CANISTER SUCTION 3000ML MECHANICAL FILTER AUTO SHUTOFF MEDI-VAC NONSTERILE LF DISP  (40EA/CA)

## (undated) DEVICE — GOWN SURGEONS X-LARGE - DISP. (30/CA)

## (undated) DEVICE — SUCTION INSTRUMENT YANKAUER BULBOUS TIP W/O VENT (50EA/CA)

## (undated) DEVICE — TUBE SHILEY ENDOTRACHEAL ORAL RAE CUFFED 7.0MM WITH TAPERGUARD (10EA/PK)

## (undated) DEVICE — TOWEL STOP TIMEOUT SAFETY FLAG (40EA/CA)

## (undated) DEVICE — UTERINE MANIP RUMI 6.7X8 - (5/BX)

## (undated) DEVICE — PAD SANITARY 11IN MAXI IND WRAPPED  (12EA/PK 24PK/CA)

## (undated) DEVICE — TROCAR 5X100 BLADED ADVANCE - FIXATION (6/BX)

## (undated) DEVICE — SENSOR OXIMETER ADULT SPO2 RD SET (20EA/BX)

## (undated) DEVICE — SYRINGE NON SAFETY 3 CC 21 GA X 1 1/2 IN (100/BX 8BX/CA)

## (undated) DEVICE — SET LEADWIRE 5 LEAD BEDSIDE DISPOSABLE ECG (1SET OF 5/EA)

## (undated) DEVICE — LIGASURE 5MM BLUNT TIP LONG - 44CM (6EA/PK)

## (undated) DEVICE — CHLORAPREP 26 ML APPLICATOR - ORANGE TINT(25/CA)

## (undated) DEVICE — PACK LAPAROSCOPY - (1/CA)

## (undated) DEVICE — KIT  I.V. START (100EA/CA)

## (undated) DEVICE — TUBE CONNECTING SUCTION - CLEAR PLASTIC STERILE 72 IN (50EA/CA)

## (undated) DEVICE — TROCAR Z THREAD 11 X 100 - BLADED (6/BX)

## (undated) DEVICE — ARMREST CRADLE FOAM - (2PR/PK 12PR/CA)

## (undated) DEVICE — GLOVE BIOGEL SZ 8.5 SURGICAL PF LTX - (50PR/BX 4BX/CA)

## (undated) DEVICE — SUTURE GENERAL